# Patient Record
Sex: MALE | Race: WHITE | NOT HISPANIC OR LATINO | Employment: FULL TIME | URBAN - METROPOLITAN AREA
[De-identification: names, ages, dates, MRNs, and addresses within clinical notes are randomized per-mention and may not be internally consistent; named-entity substitution may affect disease eponyms.]

---

## 2017-01-30 NOTE — PROGRESS NOTES
SUBJECTIVE:                                                    Baldemar Johnson is a 27 year old male who presents to clinic today for the following health issues:      HPI     Would like to get chant ix.     Medication Followup of Adderall     Taking Medication as prescribed: yes    Side Effects:  None    Medication Helping Symptoms:  NO-I feel like I'm growing a tolerance to it        Problem list and histories reviewed & adjusted, as indicated.  Additional history: as documented    Medication Followup of Minocycline     Taking Medication as prescribed: yes    Side Effects:  None    Medication Helping Symptoms:  NO- Dr said it gets worse before it gets better?Been taking it for a little over a month.        Patient Active Problem List   Diagnosis     Moderate episode of recurrent major depressive disorder (H)     Attention deficit hyperactivity disorder (ADHD), combined type     Tobacco use disorder     Acne vulgaris     History reviewed. No pertinent past surgical history.    Social History   Substance Use Topics     Smoking status: Current Every Day Smoker -- 0.50 packs/day     Start date: 07/12/2006     Smokeless tobacco: Not on file     Alcohol Use: Yes      Comment: occ     Family History   Problem Relation Age of Onset     MENTAL ILLNESS Father          Current Outpatient Prescriptions   Medication Sig Dispense Refill     [START ON 4/3/2017] amphetamine-dextroamphetamine (ADDERALL XR) 10 MG per 24 hr capsule Take 1 capsule (10 mg) by mouth daily 30 capsule 0     [START ON 4/3/2017] amphetamine-dextroamphetamine (ADDERALL) 10 MG per tablet Take one pill in the afternoon - no later than 3:00PM as needed 30 tablet 0     minocycline (MINOCIN/DYNACIN) 100 MG capsule 100 mg  0     TYLENOL 325 MG PO TABS 2 TABLETS EVERY 4 HOURS AS NEEDED for pain/fever       IBUPROFEN 200 MG PO CAPS 1-3 CAPS EVERY 6 HOURS AS NEEDED for pain/fever       No Known Allergies  BP Readings from Last 3 Encounters:   02/03/17 110/40  "  11/04/16 114/70   09/08/16 106/60    Wt Readings from Last 3 Encounters:   02/03/17 200 lb (90.719 kg)   11/04/16 195 lb (88.451 kg)   09/08/16 177 lb (80.287 kg)                  Labs reviewed in EPIC  Problem list, Medication list, Allergies, and Medical/Social/Surgical histories reviewed in [facility] and updated as appropriate.    ROS:  Constitutional, HEENT, cardiovascular, pulmonary, gi and gu systems are negative, except as otherwise noted.    OBJECTIVE:                                                    /40 mmHg  Pulse 80  Temp(Src) 98.1  F (36.7  C) (Temporal)  Resp 16  Ht 6' 2.61\" (1.895 m)  Wt 200 lb (90.719 kg)  BMI 25.26 kg/m2  Body mass index is 25.26 kg/(m^2).  Physical Exam   Constitutional: He appears well-developed and well-nourished.   HENT:   Head: Normocephalic and atraumatic.   Eyes: EOM are normal.   Neck: Normal range of motion.   Cardiovascular: Normal rate and regular rhythm.    Pulmonary/Chest: Effort normal and breath sounds normal.   Psychiatric: He has a normal mood and affect.         Diagnostic Test Results:  none      ASSESSMENT/PLAN:                                                      Problem List Items Addressed This Visit     Attention deficit hyperactivity disorder (ADHD), combined type     Well controlled on current dose of adderall  Continue dose  Refills for 3 months given   Recheck in 3 months         Relevant Medications    amphetamine-dextroamphetamine (ADDERALL XR) 10 MG per 24 hr capsule (Start on 4/3/2017)    amphetamine-dextroamphetamine (ADDERALL) 10 MG per tablet (Start on 4/3/2017)    Acne vulgaris     milldly imporved  Continue the higher dose of minocycline as ordered by dermatology         Relevant Medications    minocycline (MINOCIN/DYNACIN) 100 MG capsule      Other Visit Diagnoses     Inattention         Relevant Medications     amphetamine-dextroamphetamine (ADDERALL XR) 10 MG per 24 hr capsule (Start on 4/3/2017)     amphetamine-dextroamphetamine " (ADDERALL) 10 MG per tablet (Start on 4/3/2017)            Tobacco Cessation:   reports that he has been smoking.  He started smoking about 10 years ago. He does not have any smokeless tobacco history on file.  Tobacco Cessation Action Plan: pt in precontemplative phase. Currently going through stressors.. Will set up a quit date and will return to clinic when he is completely norah Levine MD  [facility]

## 2017-02-03 ENCOUNTER — OFFICE VISIT (OUTPATIENT)
Dept: FAMILY MEDICINE | Facility: OTHER | Age: 27
End: 2017-02-03
Payer: COMMERCIAL

## 2017-02-03 VITALS
HEART RATE: 80 BPM | HEIGHT: 75 IN | DIASTOLIC BLOOD PRESSURE: 40 MMHG | WEIGHT: 200 LBS | TEMPERATURE: 98.1 F | BODY MASS INDEX: 24.87 KG/M2 | RESPIRATION RATE: 16 BRPM | SYSTOLIC BLOOD PRESSURE: 110 MMHG

## 2017-02-03 DIAGNOSIS — F90.2 ATTENTION DEFICIT HYPERACTIVITY DISORDER (ADHD), COMBINED TYPE: ICD-10-CM

## 2017-02-03 DIAGNOSIS — R41.840 INATTENTION: ICD-10-CM

## 2017-02-03 DIAGNOSIS — L70.0 ACNE VULGARIS: ICD-10-CM

## 2017-02-03 PROCEDURE — 99213 OFFICE O/P EST LOW 20 MIN: CPT | Performed by: FAMILY MEDICINE

## 2017-02-03 RX ORDER — DEXTROAMPHETAMINE SACCHARATE, AMPHETAMINE ASPARTATE, DEXTROAMPHETAMINE SULFATE AND AMPHETAMINE SULFATE 2.5; 2.5; 2.5; 2.5 MG/1; MG/1; MG/1; MG/1
TABLET ORAL
Qty: 30 TABLET | Refills: 0 | Status: SHIPPED | OUTPATIENT
Start: 2017-02-03 | End: 2017-02-03

## 2017-02-03 RX ORDER — DEXTROAMPHETAMINE SACCHARATE, AMPHETAMINE ASPARTATE, DEXTROAMPHETAMINE SULFATE AND AMPHETAMINE SULFATE 2.5; 2.5; 2.5; 2.5 MG/1; MG/1; MG/1; MG/1
TABLET ORAL
Qty: 30 TABLET | Refills: 0 | Status: SHIPPED | OUTPATIENT
Start: 2017-03-03 | End: 2017-02-03

## 2017-02-03 RX ORDER — DEXTROAMPHETAMINE SACCHARATE, AMPHETAMINE ASPARTATE MONOHYDRATE, DEXTROAMPHETAMINE SULFATE AND AMPHETAMINE SULFATE 2.5; 2.5; 2.5; 2.5 MG/1; MG/1; MG/1; MG/1
10 CAPSULE, EXTENDED RELEASE ORAL DAILY
Qty: 30 CAPSULE | Refills: 0 | Status: SHIPPED | OUTPATIENT
Start: 2017-02-03 | End: 2017-02-03

## 2017-02-03 RX ORDER — DEXTROAMPHETAMINE SACCHARATE, AMPHETAMINE ASPARTATE MONOHYDRATE, DEXTROAMPHETAMINE SULFATE AND AMPHETAMINE SULFATE 2.5; 2.5; 2.5; 2.5 MG/1; MG/1; MG/1; MG/1
10 CAPSULE, EXTENDED RELEASE ORAL DAILY
Qty: 30 CAPSULE | Refills: 0 | Status: SHIPPED | OUTPATIENT
Start: 2017-03-03 | End: 2017-02-03

## 2017-02-03 RX ORDER — DEXTROAMPHETAMINE SACCHARATE, AMPHETAMINE ASPARTATE MONOHYDRATE, DEXTROAMPHETAMINE SULFATE AND AMPHETAMINE SULFATE 2.5; 2.5; 2.5; 2.5 MG/1; MG/1; MG/1; MG/1
10 CAPSULE, EXTENDED RELEASE ORAL DAILY
Qty: 30 CAPSULE | Refills: 0 | Status: SHIPPED | OUTPATIENT
Start: 2017-04-03 | End: 2017-06-13

## 2017-02-03 RX ORDER — DEXTROAMPHETAMINE SACCHARATE, AMPHETAMINE ASPARTATE, DEXTROAMPHETAMINE SULFATE AND AMPHETAMINE SULFATE 2.5; 2.5; 2.5; 2.5 MG/1; MG/1; MG/1; MG/1
TABLET ORAL
Qty: 30 TABLET | Refills: 0 | Status: SHIPPED | OUTPATIENT
Start: 2017-04-03 | End: 2017-06-13

## 2017-02-03 RX ORDER — MINOCYCLINE HYDROCHLORIDE 100 MG/1
100 CAPSULE ORAL
Refills: 0 | COMMUNITY
Start: 2016-12-20 | End: 2017-06-13

## 2017-02-03 ASSESSMENT — PAIN SCALES - GENERAL: PAINLEVEL: NO PAIN (0)

## 2017-02-03 NOTE — NURSING NOTE
"Chief Complaint   Patient presents with     Recheck Medication     Panel Management     tdap, hpv, flu        Initial /40 mmHg  Pulse 80  Temp(Src) 98.1  F (36.7  C) (Temporal)  Resp 16  Ht 6' 2.61\" (1.895 m)  Wt 200 lb (90.719 kg)  BMI 25.26 kg/m2 Estimated body mass index is 25.26 kg/(m^2) as calculated from the following:    Height as of this encounter: 6' 2.61\" (1.895 m).    Weight as of this encounter: 200 lb (90.719 kg).  BP completed using cuff size: regular  Janis Becerra CMA    "

## 2017-02-03 NOTE — MR AVS SNAPSHOT
"              After Visit Summary   2/3/2017    Baldemar Johnson    MRN: [id number]           Patient Information     Date Of Birth          1990        Visit Information        Provider Department      2/3/2017 1:20 PM Shagufta Levine MD [facility]        Today's Diagnoses     Need for HPV vaccine    -  1     Need for prophylactic vaccination and inoculation against influenza         Need for prophylactic vaccination with tetanus-diphtheria (TD)         Inattention            Follow-ups after your visit        Follow-up notes from your care team     Return in about 3 months (around 5/3/2017).      Who to contact     If you have questions or need follow up information about today's clinic visit or your schedule please contact [facility] directly at [phone].  Normal or non-critical lab and imaging results will be communicated to you by [employer], letter or phone within 4 business days after the clinic has received the results. If you do not hear from us within 7 days, please contact the clinic through [employer] or phone. If you have a critical or abnormal lab result, we will notify you by phone as soon as possible.  Submit refill requests through [employer] or call your pharmacy and they will forward the refill request to us. Please allow 3 business days for your refill to be completed.          Additional Information About Your Visit        [employer] Information     [employer] lets you send messages to your doctor, view your test results, renew your prescriptions, schedule appointments and more. To sign up, go to [email or web address] . Click on \"Log in\" on the left side of the screen, which will take you to the Welcome page. Then click on \"Sign up Now\" on the right side of the page.     You will be asked to enter the access code listed below, as well as some personal information. Please follow the directions to create your username and password.     Your access code is: " [email or web address]  Expires: 2017  1:47 PM     Your access code will  in 90 days. If you need help or a new code, please call your [facility] or [phone].        Care EveryWhere ID     This is your Care EveryWhere ID. This could be used by other organizations to access your [place] medical records  [id number]        Your Vitals Were     Pulse Temperature Respirations Height BMI (Body Mass Index)       80 98.1  F (36.7  C) (Temporal) 16 6' 2.61\" (1.895 m) 25.26 kg/m2        Blood Pressure from Last 3 Encounters:   17 110/40   16 114/70   16 106/60    Weight from Last 3 Encounters:   17 200 lb (90.719 kg)   16 195 lb (88.451 kg)   16 177 lb (80.287 kg)              Today, you had the following     No orders found for display         Today's Medication Changes          These changes are accurate as of: 2/3/17  1:47 PM.  If you have any questions, ask your nurse or doctor.               Start taking these medicines.        Dose/Directions    * amphetamine-dextroamphetamine 10 MG per 24 hr capsule   Commonly known as:  ADDERALL XR   Used for:  Inattention   Started by:  Shagufta Levine MD        Dose:  10 mg   Start taking on:  4/3/2017   Take 1 capsule (10 mg) by mouth daily   Quantity:  30 capsule   Refills:  0       * amphetamine-dextroamphetamine 10 MG per tablet   Commonly known as:  ADDERALL   Used for:  Inattention   Started by:  Shagufta Levine MD        Start taking on:  4/3/2017   Take one pill in the afternoon - no later than 3:00PM as needed   Quantity:  30 tablet   Refills:  0       * Notice:  This list has 2 medication(s) that are the same as other medications prescribed for you. Read the directions carefully, and ask your doctor or other care provider to review them with you.         Where to get your medicines      Some of these will need a paper prescription and others can be bought over the counter.  Ask your nurse if you have questions.     Bring a " paper prescription for each of these medications    - amphetamine-dextroamphetamine 10 MG per 24 hr capsule  - amphetamine-dextroamphetamine 10 MG per tablet             Primary Care Provider    None Specified       No primary provider on file.        Thank you!     Thank you for choosing [facility]  for your care. Our goal is always to provide you with excellent care. Hearing back from our patients is one way we can continue to improve our services. Please take a few minutes to complete the written survey that you may receive in the mail after your visit with us. Thank you!             Your Updated Medication List - Protect others around you: Learn how to safely use, store and throw away your medicines at www.disposemymeds.org.          This list is accurate as of: 2/3/17  1:47 PM.  Always use your most recent med list.                   Brand Name Dispense Instructions for use    * amphetamine-dextroamphetamine 10 MG per 24 hr capsule   Start taking on:  4/3/2017    ADDERALL XR    30 capsule    Take 1 capsule (10 mg) by mouth daily       * amphetamine-dextroamphetamine 10 MG per tablet   Start taking on:  4/3/2017    ADDERALL    30 tablet    Take one pill in the afternoon - no later than 3:00PM as needed       ibuprofen 200 MG capsule      1-3 CAPS EVERY 6 HOURS AS NEEDED for pain/fever       minocycline 100 MG capsule    MINOCIN/DYNACIN     100 mg       TYLENOL 325 MG tablet   Generic drug:  acetaminophen      2 TABLETS EVERY 4 HOURS AS NEEDED for pain/fever       * Notice:  This list has 2 medication(s) that are the same as other medications prescribed for you. Read the directions carefully, and ask your doctor or other care provider to review them with you.

## 2017-02-05 NOTE — ASSESSMENT & PLAN NOTE
Well controlled on current dose of adderall  Continue dose  Refills for 3 months given   Recheck in 3 months

## 2017-06-13 ENCOUNTER — OFFICE VISIT (OUTPATIENT)
Dept: FAMILY MEDICINE | Facility: CLINIC | Age: 27
End: 2017-06-13
Payer: COMMERCIAL

## 2017-06-13 VITALS
SYSTOLIC BLOOD PRESSURE: 112 MMHG | HEART RATE: 80 BPM | DIASTOLIC BLOOD PRESSURE: 66 MMHG | BODY MASS INDEX: 23.87 KG/M2 | TEMPERATURE: 98 F | WEIGHT: 189 LBS

## 2017-06-13 DIAGNOSIS — M25.511 ACUTE PAIN OF RIGHT SHOULDER: ICD-10-CM

## 2017-06-13 DIAGNOSIS — L70.0 ACNE VULGARIS: ICD-10-CM

## 2017-06-13 DIAGNOSIS — F90.2 ATTENTION DEFICIT HYPERACTIVITY DISORDER (ADHD), COMBINED TYPE: Primary | ICD-10-CM

## 2017-06-13 DIAGNOSIS — F33.1 MODERATE EPISODE OF RECURRENT MAJOR DEPRESSIVE DISORDER (H): ICD-10-CM

## 2017-06-13 DIAGNOSIS — Z23 NEED FOR TDAP VACCINATION: ICD-10-CM

## 2017-06-13 PROCEDURE — 90715 TDAP VACCINE 7 YRS/> IM: CPT | Performed by: NURSE PRACTITIONER

## 2017-06-13 PROCEDURE — 90471 IMMUNIZATION ADMIN: CPT | Performed by: NURSE PRACTITIONER

## 2017-06-13 PROCEDURE — 99214 OFFICE O/P EST MOD 30 MIN: CPT | Mod: 25 | Performed by: NURSE PRACTITIONER

## 2017-06-13 RX ORDER — NAPROXEN 500 MG/1
500 TABLET ORAL 2 TIMES DAILY PRN
Qty: 60 TABLET | Refills: 1 | Status: SHIPPED | OUTPATIENT
Start: 2017-06-13 | End: 2017-07-28

## 2017-06-13 RX ORDER — DEXTROAMPHETAMINE SACCHARATE, AMPHETAMINE ASPARTATE MONOHYDRATE, DEXTROAMPHETAMINE SULFATE AND AMPHETAMINE SULFATE 2.5; 2.5; 2.5; 2.5 MG/1; MG/1; MG/1; MG/1
10 CAPSULE, EXTENDED RELEASE ORAL DAILY
Qty: 30 CAPSULE | Refills: 0 | Status: SHIPPED | OUTPATIENT
Start: 2017-06-13 | End: 2017-07-28 | Stop reason: DRUGHIGH

## 2017-06-13 RX ORDER — DEXTROAMPHETAMINE SACCHARATE, AMPHETAMINE ASPARTATE, DEXTROAMPHETAMINE SULFATE AND AMPHETAMINE SULFATE 2.5; 2.5; 2.5; 2.5 MG/1; MG/1; MG/1; MG/1
TABLET ORAL
Qty: 30 TABLET | Refills: 0 | Status: SHIPPED | OUTPATIENT
Start: 2017-06-13 | End: 2017-07-28 | Stop reason: DRUGHIGH

## 2017-06-13 NOTE — MR AVS SNAPSHOT
After Visit Summary   6/13/2017    Baldemar Johnson    MRN: [id number]           Patient Information     Date Of Birth          1990        Visit Information        Provider Department      6/13/2017 10:30 AM Liza Bhatti APRN CNP [facility]        Today's Diagnoses     Attention deficit hyperactivity disorder (ADHD), combined type    -  1    Acne vulgaris        Acute pain of right shoulder        Moderate episode of recurrent major depressive disorder (H)           Follow-ups after your visit        Additional Services     DERMATOLOGY REFERRAL       Your provider has referred you to: [facility]: [facility] [phone]   [email or web address]    Please be aware that coverage of these services is subject to the terms and limitations of your health insurance plan.  Call member services at your health plan with any benefit or coverage questions.      Please bring the following with you to your appointment:    (1) Any X-Rays, CTs or MRIs which have been performed.  Contact the facility where they were done to arrange for  prior to your scheduled appointment.    (2) List of current medications  (3) This referral request   (4) Any documents/labs given to you for this referral                  Who to contact     If you have questions or need follow up information about today's clinic visit or your schedule please contact [facility] directly at [phone].  Normal or non-critical lab and imaging results will be communicated to you by MyChart, letter or phone within 4 business days after the clinic has received the results. If you do not hear from us within 7 days, please contact the clinic through MyChart or phone. If you have a critical or abnormal lab result, we will notify you by phone as soon as possible.  Submit refill requests through [employer] or call your pharmacy and they will  "forward the refill request to us. Please allow 3 business days for your refill to be completed.          Additional Information About Your Visit        [employer] Information     [employer] lets you send messages to your doctor, view your test results, renew your prescriptions, schedule appointments and more. To sign up, go to [email or web address] . Click on \"Log in\" on the left side of the screen, which will take you to the Welcome page. Then click on \"Sign up Now\" on the right side of the page.     You will be asked to enter the access code listed below, as well as some personal information. Please follow the directions to create your username and password.     Your access code is: [email or web address]  Expires: 2017 11:05 AM     Your access code will  in 90 days. If you need help or a new code, please call your [place] clinic or [phone].        Care EveryWhere ID     This is your Care EveryWhere ID. This could be used by other organizations to access your [place] medical records  [id number]        Your Vitals Were     Pulse Temperature BMI (Body Mass Index)             80 98  F (36.7  C) (Tympanic) 23.87 kg/m2          Blood Pressure from Last 3 Encounters:   17 112/66   17 110/40   16 114/70    Weight from Last 3 Encounters:   17 189 lb (85.7 kg)   17 200 lb (90.7 kg)   16 195 lb (88.5 kg)              We Performed the Following     DERMATOLOGY REFERRAL          Today's Medication Changes          These changes are accurate as of: 17 11:05 AM.  If you have any questions, ask your nurse or doctor.               Start taking these medicines.        Dose/Directions    naproxen 500 MG tablet   Commonly known as:  NAPROSYN   Used for:  Acute pain of right shoulder   Started by:  Liza Bhatti APRN CNP        Dose:  500 mg   Take 1 tablet (500 mg) by mouth 2 times daily as needed for moderate pain   Quantity:  60 tablet   Refills:  1            Where to get your " medicines      These medications were sent to [facility] [place], [place] - [address]  [address] , [facility] [address]     Phone:  [phone]     naproxen 500 MG tablet         Some of these will need a paper prescription and others can be bought over the counter.  Ask your nurse if you have questions.     Bring a paper prescription for each of these medications     amphetamine-dextroamphetamine 10 MG per 24 hr capsule    amphetamine-dextroamphetamine 10 MG per tablet                Primary Care Provider Office Phone # Fax #    Shagufta Levine [phone]       [facility] [address]    [facility] [address]        Thank you!     Thank you for choosing [facility]  for your care. Our goal is always to provide you with excellent care. Hearing back from our patients is one way we can continue to improve our services. Please take a few minutes to complete the written survey that you may receive in the mail after your visit with us. Thank you!             Your Updated Medication List - Protect others around you: Learn how to safely use, store and throw away your medicines at www.disposemymeds.org.          This list is accurate as of: 6/13/17 11:05 AM.  Always use your most recent med list.                   Brand Name Dispense Instructions for use    * amphetamine-dextroamphetamine 10 MG per 24 hr capsule    ADDERALL XR    30 capsule    Take 1 capsule (10 mg) by mouth daily       * amphetamine-dextroamphetamine 10 MG per tablet    ADDERALL    30 tablet    Take one pill in the afternoon - no later than 3:00PM as needed       ibuprofen 200 MG capsule      1-3 CAPS EVERY 6 HOURS AS NEEDED for pain/fever       naproxen 500 MG tablet    NAPROSYN    60 tablet    Take 1 tablet (500 mg) by mouth 2 times daily as needed for moderate pain       TYLENOL 325 MG tablet   Generic drug:  acetaminophen      2 TABLETS EVERY 4 HOURS AS NEEDED for  pain/fever       * Notice:  This list has 2 medication(s) that are the same as other medications prescribed for you. Read the directions carefully, and ask your doctor or other care provider to review them with you.

## 2017-06-13 NOTE — PROGRESS NOTES
SUBJECTIVE:                                                    Baldemar Johnson is a 27 year old male who presents to clinic today for the following health issues:      Joint Pain     Onset: couple weeks    Description:   Location: right shoulder  Character: Sharp    Intensity: mild, moderate    Progression of Symptoms: same    Accompanying Signs & Symptoms:  Other symptoms: decreased ROM   History:   Previous similar pain: no       Precipitating factors:   Trauma or overuse: no     Alleviating factors:  Improved by: NSAID - helped for short time       Therapies Tried and outcome: ibuprofen      The patient is here for 3 separate concerns:  1. Right shoulder pain. He reports having pain in the right shoulder for the past 2 weeks. Pain is associated primarily with internal rotation against resistance, such as when he reaches behind himself to grab a pillow in his bed, or other such activities. He otherwise notes no tenderness to palpation, and has a range of motion. He does not recall any one incident of trauma. However, he does a lot of lifting at work repetitively. He has no weakness, numbness, or tingling into the forearm or fingers. He denies neck pain.    2. He has a history of ADHD, did have a consultation with Greta. He was taking Adderall, was unable to make it to his follow-up appointments, and so has been out of medication. He has difficulty making it to the [facility], so is planning to get care at the clinic in [place] since it is closer to his home. He was taking Adderall XR 10 mg in the morning, Adderall immediate release in the afternoon. He denies associated side effects, states he felt much better while taking it. He denies use of drugs, previously had smoked marijuana but has quit that. He has been drinking alcohol socially, but states he does not drink it when he was taking the Adderall. He did have a drug screen last September before starting medication, and it showed no drugs    3. He has  severe acne. Was seeing a dermatologist in [place]. He states the only recommendation was to increase his minocycline, which didn't seem to be helping at all anyway. He would like to see another dermatologist, would like to consider Accutane        Problem list and histories reviewed & adjusted, as indicated.  Additional history: as documented    BP Readings from Last 3 Encounters:   06/13/17 112/66   02/03/17 110/40   11/04/16 114/70    Wt Readings from Last 3 Encounters:   06/13/17 189 lb (85.7 kg)   02/03/17 200 lb (90.7 kg)   11/04/16 195 lb (88.5 kg)                    Reviewed and updated as needed this visit by clinical staff  Tobacco  Allergies  Meds  Med Hx  Surg Hx  Fam Hx  Soc Hx      Reviewed and updated as needed this visit by Provider         ROS:  Constitutional, HEENT, cardiovascular, pulmonary, gi and gu systems are negative, except as otherwise noted.    OBJECTIVE:                                                    /66  Pulse 80  Temp 98  F (36.7  C) (Tympanic)  Wt 189 lb (85.7 kg)  BMI 23.87 kg/m2  Body mass index is 23.87 kg/(m^2).  GENERAL: healthy, alert and no distress  RESP: lungs clear to auscultation - no rales, rhonchi or wheezes  CV: regular rate and rhythm, normal S1 S2, no S3 or S4, no murmur, click or rub, no peripheral edema and peripheral pulses strong  MS: Normal muscle bulk and tone. No abnormality of the right shoulder noted on visual inspection. No edema or deformity, no discoloration. No pain to palpation of the a.c. joint. Full range of motion. He is able to abduct the arm in a full 180  arc. Forms external and internal rotational movements without pain. However, when internally rotating the shoulder and reaching behind him self as to pull the pillow, he reports pain along the anterior aspect of the glenohumeral joint.  SKIN: Multiple closed comedones, pustules, and cysts over the face and neck. Several acne scars noted, diffuse patchy erythema  PSYCH:  mentation appears normal, affect normal/bright         ASSESSMENT/PLAN:                                                      Problem List Items Addressed This Visit        Medium    Moderate episode of recurrent major depressive disorder (H)    Attention deficit hyperactivity disorder (ADHD), combined type - Primary    Relevant Medications    amphetamine-dextroamphetamine (ADDERALL XR) 10 MG per 24 hr capsule    amphetamine-dextroamphetamine (ADDERALL) 10 MG per tablet    Acne vulgaris    Relevant Orders    DERMATOLOGY REFERRAL      Other Visit Diagnoses     Acute pain of right shoulder        Relevant Medications    naproxen (NAPROSYN) 500 MG tablet    Need for Tdap vaccination        Relevant Orders    TDAP VACCINE (ADACEL) (Completed)    VACCINE ADMINISTRATION, INITIAL (Completed)         Naproxen 500 mg b.i.d., cautioned to take this with food  Ice to the shoulder 15 minutes at least twice daily  Avoid repetitive movements as much as possible  If not improving in 3-4 weeks will refer to physical therapy    Referral made to dermatology for further evaluation and management of acne    Resume Adderall XR 10 mg in the morning, Adderall immediate release 10 mg in the early afternoon. Follow up in clinic in one month for recheck and refills    SOLITARIO Hogan [facility]    Screening Questionnaire for Adult Immunization    Are you sick today?   No   Do you have allergies to medications, food, a vaccine component or latex?   No   Have you ever had a serious reaction after receiving a vaccination?   No   Do you have a long-term health problem with heart disease, lung disease, asthma, kidney disease, metabolic disease (e.g. diabetes), anemia, or other blood disorder?   No   Do you have cancer, leukemia, HIV/AIDS, or any other immune system problem?   No   In the past 3 months, have you taken medications that affect  your immune system, such as prednisone, other steroids, or anticancer drugs;  drugs for the treatment of rheumatoid arthritis, Crohn s disease, or psoriasis; or have you had radiation treatments?   No   Have you had a seizure, or a brain or other nervous system problem?   No   During the past year, have you received a transfusion of blood or blood     products, or been given immune (gamma) globulin or antiviral drug?   No   For women: Are you pregnant or is there a chance you could become        pregnant during the next month?   No   Have you received any vaccinations in the past 4 weeks?   No     Immunization questionnaire answers were all negative.      [facility] does apply for the following reason:  [place] Health [place] Program (MHCP[place] enrollee: [place] Medical Assistance ([place]), Senior Moments, or a Prepaid Medical Assistance Program (PMAP) (ages covered = 0-18).    Per orders of Liza Teixeira, injection of tdap given by Avril Esposito. Patient instructed to remain in clinic for 20 minutes afterwards, and to report any adverse reaction to me immediately.       Screening performed by Avril Esposito on 6/13/2017 at 11:13 AM.  Verified patient's name and date of birth to confirm prior to injection. Instructed patient to remain in clinic 20 minutes following injection, in case allergic reaction occurs seek medical staff. A Case MA

## 2017-06-14 ASSESSMENT — PATIENT HEALTH QUESTIONNAIRE - PHQ9: SUM OF ALL RESPONSES TO PHQ QUESTIONS 1-9: 7

## 2017-07-28 ENCOUNTER — OFFICE VISIT (OUTPATIENT)
Dept: FAMILY MEDICINE | Facility: CLINIC | Age: 27
End: 2017-07-28
Payer: COMMERCIAL

## 2017-07-28 VITALS
WEIGHT: 185 LBS | DIASTOLIC BLOOD PRESSURE: 68 MMHG | HEART RATE: 70 BPM | TEMPERATURE: 98.6 F | SYSTOLIC BLOOD PRESSURE: 120 MMHG | BODY MASS INDEX: 23.37 KG/M2

## 2017-07-28 DIAGNOSIS — F90.2 ATTENTION DEFICIT HYPERACTIVITY DISORDER (ADHD), COMBINED TYPE: ICD-10-CM

## 2017-07-28 PROCEDURE — 99213 OFFICE O/P EST LOW 20 MIN: CPT | Performed by: NURSE PRACTITIONER

## 2017-07-28 RX ORDER — DEXTROAMPHETAMINE SACCHARATE, AMPHETAMINE ASPARTATE, DEXTROAMPHETAMINE SULFATE AND AMPHETAMINE SULFATE 3.75; 3.75; 3.75; 3.75 MG/1; MG/1; MG/1; MG/1
15 TABLET ORAL 2 TIMES DAILY
Qty: 60 TABLET | Refills: 0 | Status: SHIPPED | OUTPATIENT
Start: 2017-07-28 | End: 2017-08-30

## 2017-07-28 RX ORDER — DEXTROAMPHETAMINE SACCHARATE, AMPHETAMINE ASPARTATE MONOHYDRATE, DEXTROAMPHETAMINE SULFATE AND AMPHETAMINE SULFATE 2.5; 2.5; 2.5; 2.5 MG/1; MG/1; MG/1; MG/1
10 CAPSULE, EXTENDED RELEASE ORAL DAILY
Qty: 30 CAPSULE | Refills: 0 | Status: CANCELLED | OUTPATIENT
Start: 2017-07-28

## 2017-07-28 RX ORDER — DEXTROAMPHETAMINE SACCHARATE, AMPHETAMINE ASPARTATE MONOHYDRATE, DEXTROAMPHETAMINE SULFATE AND AMPHETAMINE SULFATE 5; 5; 5; 5 MG/1; MG/1; MG/1; MG/1
20 CAPSULE, EXTENDED RELEASE ORAL DAILY
Qty: 30 CAPSULE | Refills: 0 | Status: SHIPPED | OUTPATIENT
Start: 2017-07-28 | End: 2017-08-30

## 2017-07-28 RX ORDER — DEXTROAMPHETAMINE SACCHARATE, AMPHETAMINE ASPARTATE, DEXTROAMPHETAMINE SULFATE AND AMPHETAMINE SULFATE 2.5; 2.5; 2.5; 2.5 MG/1; MG/1; MG/1; MG/1
TABLET ORAL
Qty: 30 TABLET | Refills: 0 | Status: CANCELLED | OUTPATIENT
Start: 2017-07-28

## 2017-07-28 NOTE — NURSING NOTE
"Chief Complaint   Patient presents with     RECHECK       Initial There were no vitals taken for this visit. Estimated body mass index is 23.87 kg/(m^2) as calculated from the following:    Height as of 2/3/17: 6' 2.61\" (1.895 m).    Weight as of 6/13/17: 189 lb (85.7 kg).  Medication Reconciliation: complete  "

## 2017-07-28 NOTE — MR AVS SNAPSHOT
"              After Visit Summary   2017    Baldemar Johnson    MRN: [id number]           Patient Information     Date Of Birth          1990        Visit Information        Provider Department      2017 8:30 AM Liza Bhatti APRN CNP [facility]        Today's Diagnoses     Attention deficit hyperactivity disorder (ADHD), combined type           Follow-ups after your visit        Who to contact     If you have questions or need follow up information about today's clinic visit or your schedule please contact [facility] directly at [phone].  Normal or non-critical lab and imaging results will be communicated to you by Visantehart, letter or phone within 4 business days after the clinic has received the results. If you do not hear from us within 7 days, please contact the clinic through Augmentt or phone. If you have a critical or abnormal lab result, we will notify you by phone as soon as possible.  Submit refill requests through [employer] or call your pharmacy and they will forward the refill request to us. Please allow 3 business days for your refill to be completed.          Additional Information About Your Visit        MyChart Information     [employer] lets you send messages to your doctor, view your test results, renew your prescriptions, schedule appointments and more. To sign up, go to [email or web address] . Click on \"Log in\" on the left side of the screen, which will take you to the Welcome page. Then click on \"Sign up Now\" on the right side of the page.     You will be asked to enter the access code listed below, as well as some personal information. Please follow the directions to create your username and password.     Your access code is: [email or web address]  Expires: 2017 11:05 AM     Your access code will  in 90 days. If you need help or a new code, please call your [facility] or [phone].        Care EveryWhere ID     This is your Care " EveryWhere ID. This could be used by other organizations to access your [facility] medical records  [id number]        Your Vitals Were     Pulse Temperature BMI (Body Mass Index)             70 98.6  F (37  C) (Tympanic) 23.37 kg/m2          Blood Pressure from Last 3 Encounters:   07/28/17 120/68   06/13/17 112/66   02/03/17 110/40    Weight from Last 3 Encounters:   07/28/17 185 lb (83.9 kg)   06/13/17 189 lb (85.7 kg)   02/03/17 200 lb (90.7 kg)              Today, you had the following     No orders found for display         Today's Medication Changes          These changes are accurate as of: 7/28/17  9:07 AM.  If you have any questions, ask your nurse or doctor.               Start taking these medicines.        Dose/Directions    * amphetamine-dextroamphetamine 20 MG per 24 hr capsule   Commonly known as:  ADDERALL XR   Used for:  Attention deficit hyperactivity disorder (ADHD), combined type   Replaces:  amphetamine-dextroamphetamine 10 MG per 24 hr capsule   Started by:  Liza Bhatti APRN CNP        Dose:  20 mg   Take 1 capsule (20 mg) by mouth daily   Quantity:  30 capsule   Refills:  0       * amphetamine-dextroamphetamine 15 MG per tablet   Commonly known as:  ADDERALL   Used for:  Attention deficit hyperactivity disorder (ADHD), combined type   Replaces:  amphetamine-dextroamphetamine 10 MG per tablet   Started by:  Liza Bhatti APRN CNP        Dose:  15 mg   Take 1 tablet (15 mg) by mouth 2 times daily   Quantity:  60 tablet   Refills:  0       * Notice:  This list has 2 medication(s) that are the same as other medications prescribed for you. Read the directions carefully, and ask your doctor or other care provider to review them with you.      Stop taking these medicines if you haven't already. Please contact your care team if you have questions.     amphetamine-dextroamphetamine 10 MG per 24 hr capsule   Commonly known as:  ADDERALL XR   Replaced by:  amphetamine-dextroamphetamine 20  MG per 24 hr capsule   Stopped by:  Liza Bhatti APRN CNP           amphetamine-dextroamphetamine 10 MG per tablet   Commonly known as:  ADDERALL   Replaced by:  amphetamine-dextroamphetamine 15 MG per tablet   Stopped by:  Liza Bhatti APRN CNP                Where to get your medicines      Some of these will need a paper prescription and others can be bought over the counter.  Ask your nurse if you have questions.     Bring a paper prescription for each of these medications     amphetamine-dextroamphetamine 15 MG per tablet    amphetamine-dextroamphetamine 20 MG per 24 hr capsule                Primary Care Provider Office Phone # Fax #    SOLITARIO Hogan [phone]       [facility]  [facility]   [place] [address]        Equal Access to Services     MARYLU WAGNER : Hadii verito joneso Somaneali, waaxda luqadaha, qaybta kaalmada adeegyada, waxay sarahin catherine torres. So [facility] [phone].    ATENCIÓN: Si habla español, tiene a jamil disposición servicios gratuitos de asistencia lingüística. [facility] [phone].    We comply with applicable federal civil rights laws and [place] laws. We do not discriminate on the basis of race, color, national origin, age, disability sex, sexual orientation or gender identity.            Thank you!     Thank you for choosing [facility]  for your care. Our goal is always to provide you with excellent care. Hearing back from our patients is one way we can continue to improve our services. Please take a few minutes to complete the written survey that you may receive in the mail after your visit with us. Thank you!             Your Updated Medication List - Protect others around you: Learn how to safely use, store and throw away your medicines at www.disposemymeds.org.          This list is accurate as of: 7/28/17  9:07 AM.  Always use your most recent med list.                   Brand Name  Dispense Instructions for use Diagnosis    * amphetamine-dextroamphetamine 20 MG per 24 hr capsule    ADDERALL XR    30 capsule    Take 1 capsule (20 mg) by mouth daily    Attention deficit hyperactivity disorder (ADHD), combined type       * amphetamine-dextroamphetamine 15 MG per tablet    ADDERALL    60 tablet    Take 1 tablet (15 mg) by mouth 2 times daily    Attention deficit hyperactivity disorder (ADHD), combined type       ibuprofen 200 MG capsule      1-3 CAPS EVERY 6 HOURS AS NEEDED for pain/fever        TYLENOL 325 MG tablet   Generic drug:  acetaminophen      2 TABLETS EVERY 4 HOURS AS NEEDED for pain/fever        * Notice:  This list has 2 medication(s) that are the same as other medications prescribed for you. Read the directions carefully, and ask your doctor or other care provider to review them with you.

## 2017-08-30 DIAGNOSIS — F90.2 ATTENTION DEFICIT HYPERACTIVITY DISORDER (ADHD), COMBINED TYPE: ICD-10-CM

## 2017-08-30 NOTE — TELEPHONE ENCOUNTER
adderall xr 20      Last Written Prescription Date: 07/28/2017  Last Fill Quantity: 30,  # refills: 0   Last Office Visit with [facility], [facility] or [facility] prescribing provider: 07/28/2017    adderall 15      Last Written Prescription Date: 07/28/2017  Last Fill Quantity: 60,  # refills: 0   Last Office Visit with [facility], [facility] or [facility] prescribing provider: 07/28/2017    Thank You,  Demian Lopez, Pharmacy [facility] [place]

## 2017-08-31 RX ORDER — DEXTROAMPHETAMINE SACCHARATE, AMPHETAMINE ASPARTATE, DEXTROAMPHETAMINE SULFATE AND AMPHETAMINE SULFATE 3.75; 3.75; 3.75; 3.75 MG/1; MG/1; MG/1; MG/1
15 TABLET ORAL 2 TIMES DAILY
Qty: 60 TABLET | Refills: 0 | Status: SHIPPED | OUTPATIENT
Start: 2017-08-31 | End: 2017-10-09

## 2017-08-31 RX ORDER — DEXTROAMPHETAMINE SACCHARATE, AMPHETAMINE ASPARTATE MONOHYDRATE, DEXTROAMPHETAMINE SULFATE AND AMPHETAMINE SULFATE 5; 5; 5; 5 MG/1; MG/1; MG/1; MG/1
20 CAPSULE, EXTENDED RELEASE ORAL DAILY
Qty: 30 CAPSULE | Refills: 0 | Status: SHIPPED | OUTPATIENT
Start: 2017-08-31 | End: 2017-10-09

## 2017-10-09 DIAGNOSIS — F90.2 ATTENTION DEFICIT HYPERACTIVITY DISORDER (ADHD), COMBINED TYPE: ICD-10-CM

## 2017-10-09 NOTE — TELEPHONE ENCOUNTER
Adderall salt 15mg       Last Written Prescription Date: 08/31/2017  Last Fill Quantity: 60,  # refills: 0     Adderall XR 20MG      Last Written Prescription Date: 08/31/2017  Last Fill Quantity: 30,  # refills: 0   Last Office Visit with [facility], [facility] or [facility] prescribing provider: 07/28/2017                                         Next 5 appointments (look out 90 days)     Oct 18, 2017 10:00 AM CDT   Office Visit with SOLITARIO Hogan [facility]    [address] [place] [address] [id number] [phone]                  Thanks  Vanita Obrien [facility]   [phone]

## 2017-10-11 RX ORDER — DEXTROAMPHETAMINE SACCHARATE, AMPHETAMINE ASPARTATE MONOHYDRATE, DEXTROAMPHETAMINE SULFATE AND AMPHETAMINE SULFATE 5; 5; 5; 5 MG/1; MG/1; MG/1; MG/1
20 CAPSULE, EXTENDED RELEASE ORAL DAILY
Qty: 30 CAPSULE | Refills: 0 | Status: SHIPPED | OUTPATIENT
Start: 2017-10-11 | End: 2017-11-14

## 2017-10-11 RX ORDER — DEXTROAMPHETAMINE SACCHARATE, AMPHETAMINE ASPARTATE, DEXTROAMPHETAMINE SULFATE AND AMPHETAMINE SULFATE 3.75; 3.75; 3.75; 3.75 MG/1; MG/1; MG/1; MG/1
15 TABLET ORAL 2 TIMES DAILY
Qty: 60 TABLET | Refills: 0 | Status: SHIPPED | OUTPATIENT
Start: 2017-10-11 | End: 2017-11-14

## 2017-11-14 ENCOUNTER — TELEPHONE (OUTPATIENT)
Dept: FAMILY MEDICINE | Facility: CLINIC | Age: 27
End: 2017-11-14

## 2017-11-14 DIAGNOSIS — F90.2 ATTENTION DEFICIT HYPERACTIVITY DISORDER (ADHD), COMBINED TYPE: ICD-10-CM

## 2017-11-21 RX ORDER — DEXTROAMPHETAMINE SACCHARATE, AMPHETAMINE ASPARTATE, DEXTROAMPHETAMINE SULFATE AND AMPHETAMINE SULFATE 3.75; 3.75; 3.75; 3.75 MG/1; MG/1; MG/1; MG/1
15 TABLET ORAL 2 TIMES DAILY
Qty: 60 TABLET | Refills: 0 | Status: SHIPPED | OUTPATIENT
Start: 2017-11-21 | End: 2017-12-19

## 2017-11-21 RX ORDER — DEXTROAMPHETAMINE SACCHARATE, AMPHETAMINE ASPARTATE MONOHYDRATE, DEXTROAMPHETAMINE SULFATE AND AMPHETAMINE SULFATE 5; 5; 5; 5 MG/1; MG/1; MG/1; MG/1
20 CAPSULE, EXTENDED RELEASE ORAL DAILY
Qty: 30 CAPSULE | Refills: 0 | Status: SHIPPED | OUTPATIENT
Start: 2017-11-21 | End: 2017-12-19

## 2017-11-21 NOTE — TELEPHONE ENCOUNTER
Routing ADDERALL  refill request to provider for review/approval because:  Drug not on the [facility] refill protocol   PRICILLA Elizalde

## 2017-12-19 DIAGNOSIS — F90.2 ATTENTION DEFICIT HYPERACTIVITY DISORDER (ADHD), COMBINED TYPE: ICD-10-CM

## 2017-12-19 RX ORDER — DEXTROAMPHETAMINE SACCHARATE, AMPHETAMINE ASPARTATE, DEXTROAMPHETAMINE SULFATE AND AMPHETAMINE SULFATE 3.75; 3.75; 3.75; 3.75 MG/1; MG/1; MG/1; MG/1
TABLET ORAL
Qty: 30 TABLET | Refills: 0 | Status: SHIPPED | OUTPATIENT
Start: 2017-12-19 | End: 2018-01-04 | Stop reason: DRUGHIGH

## 2017-12-19 RX ORDER — DEXTROAMPHETAMINE SACCHARATE, AMPHETAMINE ASPARTATE MONOHYDRATE, DEXTROAMPHETAMINE SULFATE AND AMPHETAMINE SULFATE 5; 5; 5; 5 MG/1; MG/1; MG/1; MG/1
20 CAPSULE, EXTENDED RELEASE ORAL DAILY
Qty: 30 CAPSULE | Refills: 0 | Status: SHIPPED | OUTPATIENT
Start: 2017-12-19 | End: 2018-01-04

## 2017-12-20 NOTE — TELEPHONE ENCOUNTER
I do not know when this got changed to 60 tablets a month.  Looking back at my notes during his visits, he was getting Adderall XR 20 mg in the morning, and Adderall 15 mg immediate release in the early afternoon.  He should not be taking the immediate release twice daily along with the extended release.  I think he should make an appointment to come in and discuss this, he did not make it to his last  appointment

## 2017-12-20 NOTE — TELEPHONE ENCOUNTER
Patients wife, Jada picked up prescriptions today 12.20 and the Adderall 15mg rapid release was only 30 qty. Instead of the 60 qty that they usually get. Patients wife is concerned and states the quantity needs to be 60. Please advise.

## 2018-01-04 ENCOUNTER — OFFICE VISIT (OUTPATIENT)
Dept: FAMILY MEDICINE | Facility: CLINIC | Age: 28
End: 2018-01-04
Payer: COMMERCIAL

## 2018-01-04 VITALS
BODY MASS INDEX: 23.81 KG/M2 | HEART RATE: 60 BPM | TEMPERATURE: 95.9 F | WEIGHT: 175.8 LBS | DIASTOLIC BLOOD PRESSURE: 68 MMHG | HEIGHT: 72 IN | SYSTOLIC BLOOD PRESSURE: 120 MMHG

## 2018-01-04 DIAGNOSIS — F90.2 ATTENTION DEFICIT HYPERACTIVITY DISORDER (ADHD), COMBINED TYPE: ICD-10-CM

## 2018-01-04 PROCEDURE — 99213 OFFICE O/P EST LOW 20 MIN: CPT | Performed by: NURSE PRACTITIONER

## 2018-01-04 RX ORDER — DEXTROAMPHETAMINE SACCHARATE, AMPHETAMINE ASPARTATE MONOHYDRATE, DEXTROAMPHETAMINE SULFATE AND AMPHETAMINE SULFATE 5; 5; 5; 5 MG/1; MG/1; MG/1; MG/1
20 CAPSULE, EXTENDED RELEASE ORAL DAILY
Qty: 30 CAPSULE | Refills: 0 | Status: SHIPPED | OUTPATIENT
Start: 2018-03-20 | End: 2018-04-25 | Stop reason: DRUGHIGH

## 2018-01-04 RX ORDER — DEXTROAMPHETAMINE SACCHARATE, AMPHETAMINE ASPARTATE MONOHYDRATE, DEXTROAMPHETAMINE SULFATE AND AMPHETAMINE SULFATE 5; 5; 5; 5 MG/1; MG/1; MG/1; MG/1
20 CAPSULE, EXTENDED RELEASE ORAL DAILY
Qty: 30 CAPSULE | Refills: 0 | Status: SHIPPED | OUTPATIENT
Start: 2018-01-18 | End: 2018-01-04

## 2018-01-04 RX ORDER — DEXTROAMPHETAMINE SACCHARATE, AMPHETAMINE ASPARTATE, DEXTROAMPHETAMINE SULFATE AND AMPHETAMINE SULFATE 3.75; 3.75; 3.75; 3.75 MG/1; MG/1; MG/1; MG/1
15 TABLET ORAL 2 TIMES DAILY
Qty: 28 TABLET | Refills: 0 | Status: SHIPPED | OUTPATIENT
Start: 2018-01-04 | End: 2018-02-18

## 2018-01-04 RX ORDER — DEXTROAMPHETAMINE SACCHARATE, AMPHETAMINE ASPARTATE MONOHYDRATE, DEXTROAMPHETAMINE SULFATE AND AMPHETAMINE SULFATE 5; 5; 5; 5 MG/1; MG/1; MG/1; MG/1
20 CAPSULE, EXTENDED RELEASE ORAL DAILY
Qty: 30 CAPSULE | Refills: 0 | Status: SHIPPED | OUTPATIENT
Start: 2018-02-18 | End: 2018-01-04

## 2018-01-04 RX ORDER — DEXTROAMPHETAMINE SACCHARATE, AMPHETAMINE ASPARTATE, DEXTROAMPHETAMINE SULFATE AND AMPHETAMINE SULFATE 3.75; 3.75; 3.75; 3.75 MG/1; MG/1; MG/1; MG/1
TABLET ORAL
Qty: 60 TABLET | Refills: 0 | Status: SHIPPED | OUTPATIENT
Start: 2018-01-18 | End: 2018-04-25 | Stop reason: DRUGHIGH

## 2018-01-04 NOTE — MR AVS SNAPSHOT
"              After Visit Summary   2018    Baldemar Johnson    MRN: [id number]           Patient Information     Date Of Birth          1990        Visit Information        Provider Department      2018 8:30 AM Liza Bhatti APRN CNP [facility]        Today's Diagnoses     Attention deficit hyperactivity disorder (ADHD), combined type           Follow-ups after your visit        Who to contact     If you have questions or need follow up information about today's clinic visit or your schedule please contact [facility] directly at [phone].  Normal or non-critical lab and imaging results will be communicated to you by [employer]hart, letter or phone within 4 business days after the clinic has received the results. If you do not hear from us within 7 days, please contact the clinic through HealthyOutt or phone. If you have a critical or abnormal lab result, we will notify you by phone as soon as possible.  Submit refill requests through D[employer] or call your pharmacy and they will forward the refill request to us. Please allow 3 business days for your refill to be completed.          Additional Information About Your Visit        MyChart Information     [employer] lets you send messages to your doctor, view your test results, renew your prescriptions, schedule appointments and more. To sign up, go to [email or web address] . Click on \"Log in\" on the left side of the screen, which will take you to the Welcome page. Then click on \"Sign up Now\" on the right side of the page.     You will be asked to enter the access code listed below, as well as some personal information. Please follow the directions to create your username and password.     Your access code is: [email or web address]  Expires: 2018  3:47 PM     Your access code will  in 90 days. If you need help or a new code, please call your [facility] or [phone].        Care EveryWhere ID     This is your Care EveryWhere " ID. This could be used by other organizations to access your [facility] medical records  [id number]        Your Vitals Were     Pulse Temperature Height BMI (Body Mass Index)          60 95.9  F (35.5  C) (Tympanic) 6' (1.829 m) 23.84 kg/m2         Blood Pressure from Last 3 Encounters:   01/04/18 120/68   07/28/17 120/68   06/13/17 112/66    Weight from Last 3 Encounters:   01/04/18 175 lb 12.8 oz (79.7 kg)   07/28/17 185 lb (83.9 kg)   06/13/17 189 lb (85.7 kg)              Today, you had the following     No orders found for display         Today's Medication Changes          These changes are accurate as of: 1/4/18  9:14 AM.  If you have any questions, ask your nurse or doctor.               These medicines have changed or have updated prescriptions.        Dose/Directions    * amphetamine-dextroamphetamine 15 MG per tablet   Commonly known as:  ADDERALL   This may have changed:  You were already taking a medication with the same name, and this prescription was added. Make sure you understand how and when to take each.   Used for:  Attention deficit hyperactivity disorder (ADHD), combined type   Changed by:  Liza Bhatti APRN CNP        Dose:  15 mg   Take 1 tablet (15 mg) by mouth 2 times daily   Quantity:  28 tablet   Refills:  0       * amphetamine-dextroamphetamine 15 MG per tablet   Commonly known as:  ADDERALL   This may have changed:  additional instructions   Used for:  Attention deficit hyperactivity disorder (ADHD), combined type   Changed by:  Liza Bhatti APRN CNP        Start taking on:  1/18/2018   Take 1 capsule by mouth daily in the early afternoon; may take a second capsule in early evening when working late   Quantity:  60 tablet   Refills:  0       * amphetamine-dextroamphetamine 20 MG per 24 hr capsule   Commonly known as:  ADDERALL XR   This may have changed:  You were already taking a medication with the same name, and this prescription was added. Make sure you understand how  and when to take each.   Used for:  Attention deficit hyperactivity disorder (ADHD), combined type   Changed by:  Liza Bhatti APRN CNP        Dose:  20 mg   Start taking on:  3/20/2018   Take 1 capsule (20 mg) by mouth daily   Quantity:  30 capsule   Refills:  0       * Notice:  This list has 3 medication(s) that are the same as other medications prescribed for you. Read the directions carefully, and ask your doctor or other care provider to review them with you.         Where to get your medicines      Some of these will need a paper prescription and others can be bought over the counter.  Ask your nurse if you have questions.     Bring a paper prescription for each of these medications     amphetamine-dextroamphetamine 15 MG per tablet    amphetamine-dextroamphetamine 15 MG per tablet    amphetamine-dextroamphetamine 20 MG per 24 hr capsule                Primary Care Provider Office Phone # Fax #    SOLITARIO Hogan [phone] [address] [place] [address]        Equal Access to Services     MARYLU WAGNER AH: Hadii aad ku hadasho Soomaali, waaxda luqadaha, qaybta kaalmada adeegyada, waxay idiin hayirma gearaboris london . So [facility] [phone].    ATENCIÓN: Si habla español, tiene a jamil disposición servicios gratuitos de asistencia lingüística. Llame al [phone].    We comply with applicable federal civil rights laws and [place] laws. We do not discriminate on the basis of race, color, national origin, age, disability, sex, sexual orientation, or gender identity.            Thank you!     Thank you for choosing [facility]  for your care. Our goal is always to provide you with excellent care. Hearing back from our patients is one way we can continue to improve our services. Please take a few minutes to complete the written survey that you may receive in the mail after your visit with us. Thank you!             Your Updated Medication List - Protect  others around you: Learn how to safely use, store and throw away your medicines at www.disposemymeds.org.          This list is accurate as of: 1/4/18  9:14 AM.  Always use your most recent med list.                   Brand Name Dispense Instructions for use Diagnosis    * amphetamine-dextroamphetamine 15 MG per tablet    ADDERALL    28 tablet    Take 1 tablet (15 mg) by mouth 2 times daily    Attention deficit hyperactivity disorder (ADHD), combined type       * amphetamine-dextroamphetamine 15 MG per tablet   Start taking on:  1/18/2018    ADDERALL    60 tablet    Take 1 capsule by mouth daily in the early afternoon; may take a second capsule in early evening when working late    Attention deficit hyperactivity disorder (ADHD), combined type       * amphetamine-dextroamphetamine 20 MG per 24 hr capsule   Start taking on:  3/20/2018    ADDERALL XR    30 capsule    Take 1 capsule (20 mg) by mouth daily    Attention deficit hyperactivity disorder (ADHD), combined type       ibuprofen 200 MG capsule      1-3 CAPS EVERY 6 HOURS AS NEEDED for pain/fever        TYLENOL 325 MG tablet   Generic drug:  acetaminophen      2 TABLETS EVERY 4 HOURS AS NEEDED for pain/fever        * Notice:  This list has 3 medication(s) that are the same as other medications prescribed for you. Read the directions carefully, and ask your doctor or other care provider to review them with you.

## 2018-01-04 NOTE — PROGRESS NOTES
SUBJECTIVE:   Baldemar Johnson is a 27 year old male who presents to clinic today for the following health issues:      Medication Followup of Adderall    Taking Medication as prescribed: yes    Side Effects:  None    Medication Helping Symptoms:  yes         Baldemar has been taking Adderall XR 20 mg in the morning.  He then takes Adderall immediate release 15 mg around noon.  He has been working extended hours into the late evening, and had been taking Adderall immediate release 15 mg again around 5 PM.  His last refill was just for 30 tablets of the immediate release.  He is having difficulty getting through the full evening at work.  States he has difficulty concentrating, this leads to a lot of anxiety.  He would like to be able to continue to take 2 of the immediate release as long as he is working these extended hours.  He denies adverse side effects of medication.  He has had no weight loss, no increased anxiety, blood pressure remains normal.    Problem list and histories reviewed & adjusted, as indicated.  Additional history: as documented    BP Readings from Last 3 Encounters:   01/04/18 120/68   07/28/17 120/68   06/13/17 112/66    Wt Readings from Last 3 Encounters:   01/04/18 175 lb 12.8 oz (79.7 kg)   07/28/17 185 lb (83.9 kg)   06/13/17 189 lb (85.7 kg)                      Reviewed and updated as needed this visit by clinical staff     Reviewed and updated as needed this visit by Provider         ROS:  Constitutional, HEENT, cardiovascular, pulmonary, gi and gu systems are negative, except as otherwise noted.      OBJECTIVE:   /68  Pulse 60  Temp 95.9  F (35.5  C) (Tympanic)  Ht 6' (1.829 m)  Wt 175 lb 12.8 oz (79.7 kg)  BMI 23.84 kg/m2  Body mass index is 23.84 kg/(m^2).   GENERAL: healthy, alert and no distress  RESP: lungs clear to auscultation - no rales, rhonchi or wheezes  CV: regular rates and rhythm, normal S1 S2, no S3 or S4 and no murmur, click or rub  PSYCH: mentation appears normal,  affect normal/bright        ASSESSMENT/PLAN:     Problem List Items Addressed This Visit        Medium    Attention deficit hyperactivity disorder (ADHD), combined type    Relevant Medications    amphetamine-dextroamphetamine (ADDERALL) 15 MG per tablet (Start on 1/18/2018)    amphetamine-dextroamphetamine (ADDERALL XR) 20 MG per 24 hr capsule (Start on 3/20/2018)    amphetamine-dextroamphetamine (ADDERALL) 15 MG per tablet           Continue Adderall XR 20 mg daily in the morning  Adderall immediate release 15 mg around noon  Adderall immediate release 15 mg at 5 or 6 PM when working extended hours    With his next refill he will let me know if he is still needing to take the immediate release twice a day or just once daily.    SOLITARIO Hogan [facility]

## 2018-02-18 DIAGNOSIS — F90.2 ATTENTION DEFICIT HYPERACTIVITY DISORDER (ADHD), COMBINED TYPE: ICD-10-CM

## 2018-02-19 NOTE — TELEPHONE ENCOUNTER
Adderall 15 MG       Last Written Prescription Date:  1/18/18  Last Fill Quantity: 60,   # refills: 0  Last Office Visit: 1/4/18  Future Office visit:       Routing refill request to provider for review/approval because:  Drug not on the [facility], [facility] or [facility] refill protocol or controlled substance

## 2018-02-20 RX ORDER — DEXTROAMPHETAMINE SACCHARATE, AMPHETAMINE ASPARTATE, DEXTROAMPHETAMINE SULFATE AND AMPHETAMINE SULFATE 3.75; 3.75; 3.75; 3.75 MG/1; MG/1; MG/1; MG/1
15 TABLET ORAL 2 TIMES DAILY
Qty: 28 TABLET | Refills: 0 | Status: SHIPPED | OUTPATIENT
Start: 2018-02-20 | End: 2018-03-20

## 2018-03-20 DIAGNOSIS — F90.2 ATTENTION DEFICIT HYPERACTIVITY DISORDER (ADHD), COMBINED TYPE: ICD-10-CM

## 2018-03-21 RX ORDER — DEXTROAMPHETAMINE SACCHARATE, AMPHETAMINE ASPARTATE, DEXTROAMPHETAMINE SULFATE AND AMPHETAMINE SULFATE 3.75; 3.75; 3.75; 3.75 MG/1; MG/1; MG/1; MG/1
15 TABLET ORAL 2 TIMES DAILY
Qty: 28 TABLET | Refills: 0 | Status: SHIPPED | OUTPATIENT
Start: 2018-03-21 | End: 2018-04-25 | Stop reason: DRUGHIGH

## 2018-03-21 NOTE — TELEPHONE ENCOUNTER
adderall 15      Last Written Prescription Date:  2/20/18  Last Fill Quantity: 28,   # refills: 0  Last Office Visit: 1/4/18  Future Office visit:       Routing refill request to provider for review/approval because:  Drug not on the [facility], [facility] or [facility] refill protocol or controlled substance

## 2018-04-16 ENCOUNTER — TELEPHONE (OUTPATIENT)
Dept: FAMILY MEDICINE | Facility: CLINIC | Age: 28
End: 2018-04-16

## 2018-04-16 DIAGNOSIS — F90.2 ATTENTION DEFICIT HYPERACTIVITY DISORDER (ADHD), COMBINED TYPE: ICD-10-CM

## 2018-04-16 RX ORDER — DEXTROAMPHETAMINE SACCHARATE, AMPHETAMINE ASPARTATE, DEXTROAMPHETAMINE SULFATE AND AMPHETAMINE SULFATE 3.75; 3.75; 3.75; 3.75 MG/1; MG/1; MG/1; MG/1
15 TABLET ORAL 2 TIMES DAILY
Qty: 28 TABLET | Refills: 0 | Status: CANCELLED | OUTPATIENT
Start: 2018-04-16

## 2018-04-16 NOTE — TELEPHONE ENCOUNTER
Baldemar's wife is calling stating the last refill he got was 14 day supply, not for a 30 day supply. He is out of the [place] now and it's affecting him at work. Can you fill it for him? Please call and advise.

## 2018-04-16 NOTE — TELEPHONE ENCOUNTER
Adderall       Last Written Prescription Date:  1/18/18  Last Fill Quantity: 60,   # refills: 0  Last Office Visit: 1/18/18  Future Office visit:       Routing refill request to provider for review/approval because:  Drug not on the [facility], [facility] or [facility] refill protocol or controlled substance

## 2018-04-17 NOTE — TELEPHONE ENCOUNTER
Patients wife states that patient has been out of this medication for 14 days and they would like the extended release filled as soon as possible. Wife also states to let them know if Liza Bhatti would like to see patient.

## 2018-04-17 NOTE — TELEPHONE ENCOUNTER
LM on patient numerical id vm. Please ask if patient is currently taking Adderall as listed? Provider was under the impression that this was a short term increase in medication.

## 2018-04-17 NOTE — TELEPHONE ENCOUNTER
He has been on an excessive dose.  My understanding was that this would just be temporary.  If he continues to need the stimulant for later in the evening due to working extended hours, we could do the extended release twice daily, that may be more appropriate

## 2018-04-17 NOTE — TELEPHONE ENCOUNTER
Yes, I would like to see Baldemar to verify his  need for medication and the dose.  We may need to make some changes.  Please  schedule as soon as possible.  His wife states he is out of the extended release, but the prescription refill request is for the immediate release, I am uncertain as to just worries at.  Will need to see in clinic

## 2018-04-19 NOTE — TELEPHONE ENCOUNTER
Called patient and left message to return call and speak with any .     Thank you  Talat Olea  Patient Representative

## 2018-04-25 ENCOUNTER — OFFICE VISIT (OUTPATIENT)
Dept: FAMILY MEDICINE | Facility: CLINIC | Age: 28
End: 2018-04-25
Payer: COMMERCIAL

## 2018-04-25 VITALS
BODY MASS INDEX: 23.68 KG/M2 | SYSTOLIC BLOOD PRESSURE: 126 MMHG | WEIGHT: 174.6 LBS | DIASTOLIC BLOOD PRESSURE: 80 MMHG | OXYGEN SATURATION: 100 % | HEART RATE: 90 BPM | TEMPERATURE: 97.4 F

## 2018-04-25 DIAGNOSIS — F90.0 ATTENTION DEFICIT HYPERACTIVITY DISORDER (ADHD), PREDOMINANTLY INATTENTIVE TYPE: Primary | ICD-10-CM

## 2018-04-25 PROCEDURE — 99213 OFFICE O/P EST LOW 20 MIN: CPT | Performed by: NURSE PRACTITIONER

## 2018-04-25 RX ORDER — DEXTROAMPHETAMINE SACCHARATE, AMPHETAMINE ASPARTATE, DEXTROAMPHETAMINE SULFATE AND AMPHETAMINE SULFATE 3.75; 3.75; 3.75; 3.75 MG/1; MG/1; MG/1; MG/1
TABLET ORAL
Qty: 60 TABLET | Refills: 0 | Status: SHIPPED | OUTPATIENT
Start: 2018-04-25 | End: 2018-05-19

## 2018-04-25 RX ORDER — DEXTROAMPHETAMINE SACCHARATE, AMPHETAMINE ASPARTATE MONOHYDRATE, DEXTROAMPHETAMINE SULFATE AND AMPHETAMINE SULFATE 5; 5; 5; 5 MG/1; MG/1; MG/1; MG/1
20 CAPSULE, EXTENDED RELEASE ORAL DAILY
Qty: 30 CAPSULE | Refills: 0 | Status: SHIPPED | OUTPATIENT
Start: 2018-04-25 | End: 2018-05-19

## 2018-04-25 RX ORDER — DEXTROAMPHETAMINE SACCHARATE, AMPHETAMINE ASPARTATE MONOHYDRATE, DEXTROAMPHETAMINE SULFATE AND AMPHETAMINE SULFATE 5; 5; 5; 5 MG/1; MG/1; MG/1; MG/1
20 CAPSULE, EXTENDED RELEASE ORAL 2 TIMES DAILY
Qty: 30 CAPSULE | Refills: 0 | Status: SHIPPED | OUTPATIENT
Start: 2018-04-25 | End: 2018-04-25

## 2018-04-25 NOTE — PROGRESS NOTES
SUBJECTIVE:   Baldemar Johnson is a 28 year old male who presents to clinic today for the following health issues:      Medication Followup of Adderall    Taking Medication as prescribed: NO-been off for about 1 week    Side Effects:  None    Medication Helping Symptoms:  yes       Baldeamr is on Adderall for management of ADD.  He has difficulty focusing and staying on task without it.  He has been taking Adderall XR 20 mg in the morning, then had 15 mg of immediate release at noon, and then again later in the day, because he is working 12 hour shifts, and has been having difficulty at work if he does not have the medication maintaining focus and productivity into the evening.  He presented to clinic today to discuss possibly having 2 doses of extended release medication instead of  one extended release and 2 doses of immediate release.  He denies adverse side effects to the medication.  He states without it he just does not feel able to perform as he should, he also has had poor appetite.  He and his wife are both under a lot of stress at home, and have a lot of responsibility there are taking care of their little boy who has cerebral palsy and is quite challenged and challenging.    Problem list and histories reviewed & adjusted, as indicated.  Additional history: as documented    BP Readings from Last 3 Encounters:   04/25/18 126/80   01/04/18 120/68   07/28/17 120/68    Wt Readings from Last 3 Encounters:   04/25/18 174 lb 9.6 oz (79.2 kg)   01/04/18 175 lb 12.8 oz (79.7 kg)   07/28/17 185 lb (83.9 kg)                    Reviewed and updated as needed this visit by clinical staff       Reviewed and updated as needed this visit by Provider         ROS:  Constitutional, HEENT, cardiovascular, pulmonary, gi and gu systems are negative, except as otherwise noted.    OBJECTIVE:     /80  Pulse 90  Temp 97.4  F (36.3  C) (Temporal)  Wt 174 lb 9.6 oz (79.2 kg)  SpO2 100%  BMI 23.68 kg/m2  Body mass index is 23.68  kg/(m^2).   GENERAL: Well-nourished, well-hydrated.  He appears tired  NECK: no adenopathy, no asymmetry, masses, or scars and thyroid normal to palpation  RESP: lungs clear to auscultation - no rales, rhonchi or wheezes  CV: regular rate and rhythm, normal S1 S2, no S3 or S4, no murmur, click or rub, no peripheral edema and peripheral pulses strong  PSYCH: mentation appears normal, affect normal/bright        ASSESSMENT/PLAN:     Problem List Items Addressed This Visit     None      Visit Diagnoses     Attention deficit hyperactivity disorder (ADHD), predominantly inattentive type    -  Primary    Relevant Medications    amphetamine-dextroamphetamine (ADDERALL XR) 20 MG per 24 hr capsule    amphetamine-dextroamphetamine (ADDERALL) 15 MG per tablet           BP Screening:   Last 3 BP Readings:    BP Readings from Last 3 Encounters:   04/25/18 126/80   01/04/18 120/68   07/28/17 120/68       The following was recommended to the patient:  Re-screen BP within a year and recommended lifestyle modifications    We initially were going to try Adderall XR 20 mg twice daily.  Unfortunately, this is not reimbursed by insurance.  Will resume his previous regimen of Adderall XR 20 mg in the morning, Adderall XR 15 mg in the early afternoon, and then may repeat for a second dose later in the day when he is working the late hours    SOLITARIO Hogan [facility]

## 2018-04-25 NOTE — MR AVS SNAPSHOT
"              After Visit Summary   2018    Baldemar Johnson    MRN: [id number]           Patient Information     Date Of Birth          1990        Visit Information        Provider Department      2018 8:30 AM Liza Bhatti APRN CNP [facility]        Today's Diagnoses     Attention deficit hyperactivity disorder (ADHD), predominantly inattentive type    -  1       Follow-ups after your visit        Who to contact     If you have questions or need follow up information about today's clinic visit or your schedule please contact [facility] directly at [phone].  Normal or non-critical lab and imaging results will be communicated to you by "[employer]hart, letter or phone within 4 business days after the clinic has received the results. If you do not hear from us within 7 days, please contact the clinic through "[employer]"hart or phone. If you have a critical or abnormal lab result, we will notify you by phone as soon as possible.  Submit refill requests through [employer] or call your pharmacy and they will forward the refill request to us. Please allow 3 business days for your refill to be completed.          Additional Information About Your Visit        MyChart Information     [employer] lets you send messages to your doctor, view your test results, renew your prescriptions, schedule appointments and more. To sign up, go to [email or web address] . Click on \"Log in\" on the left side of the screen, which will take you to the Welcome page. Then click on \"Sign up Now\" on the right side of the page.     You will be asked to enter the access code listed below, as well as some personal information. Please follow the directions to create your username and password.     Your access code is: [email or web address]  Expires: 2018 10:15 AM     Your access code will  in 90 days. If you need help or a new code, please call your [facility] or [phone].        Care EveryWhere ID     This " is your Care EveryWhere ID. This could be used by other organizations to access your [place] medical records  [id number]        Your Vitals Were     Pulse Temperature Pulse Oximetry BMI (Body Mass Index)          90 97.4  F (36.3  C) (Temporal) 100% 23.68 kg/m2         Blood Pressure from Last 3 Encounters:   04/25/18 126/80   01/04/18 120/68   07/28/17 120/68    Weight from Last 3 Encounters:   04/25/18 174 lb 9.6 oz (79.2 kg)   01/04/18 175 lb 12.8 oz (79.7 kg)   07/28/17 185 lb (83.9 kg)              Today, you had the following     No orders found for display         Today's Medication Changes          These changes are accurate as of 4/25/18  1:01 PM.  If you have any questions, ask your nurse or doctor.               These medicines have changed or have updated prescriptions.        Dose/Directions    * amphetamine-dextroamphetamine 20 MG per 24 hr capsule   Commonly known as:  ADDERALL XR   This may have changed:  Another medication with the same name was removed. Continue taking this medication, and follow the directions you see here.   Used for:  Attention deficit hyperactivity disorder (ADHD), predominantly inattentive type   Changed by:  Liza Bhatti APRN CNP        Dose:  20 mg   Take 1 capsule (20 mg) by mouth daily   Quantity:  30 capsule   Refills:  0       * amphetamine-dextroamphetamine 15 MG per tablet   Commonly known as:  ADDERALL   This may have changed:  Another medication with the same name was removed. Continue taking this medication, and follow the directions you see here.   Used for:  Attention deficit hyperactivity disorder (ADHD), predominantly inattentive type   Changed by:  Liza Bhatti APRN CNP        Take 1 capsule by mouth daily in the early afternoon; may take a second capsule in early evening when working late   Quantity:  60 tablet   Refills:  0       * Notice:  This list has 2 medication(s) that are the same as other medications prescribed for you. Read the  directions carefully, and ask your doctor or other care provider to review them with you.         Where to get your medicines      Some of these will need a paper prescription and others can be bought over the counter.  Ask your nurse if you have questions.     Bring a paper prescription for each of these medications     amphetamine-dextroamphetamine 15 MG per tablet    amphetamine-dextroamphetamine 20 MG per 24 hr capsule                Primary Care Provider Office Phone # Fax #    Liza Bhatti, APRN [facility] [phone]       [address] [place] [address]        Equal Access to Services     : Hadii aad ku hadasho Soomaali, waaxda luqadaha, qaybta kaalmada adeegyada, waxay idiin hayaadorina [place]ondon . So [facility] [phone].    ATENCIÓN: Si habla español, tiene a jamil disposición servicios gratuitos de asistencia lingüística. [facility] [phone].    We comply with applicable federal civil rights laws and [place] laws. We do not discriminate on the basis of race, color, national origin, age, disability, sex, sexual orientation, or gender identity.            Thank you!     Thank you for choosing [facility]  for your care. Our goal is always to provide you with excellent care. Hearing back from our patients is one way we can continue to improve our services. Please take a few minutes to complete the written survey that you may receive in the mail after your visit with us. Thank you!             Your Updated Medication List - Protect others around you: Learn how to safely use, store and throw away your medicines at www.disposemymeds.org.          This list is accurate as of 4/25/18  1:01 PM.  Always use your most recent med list.                   Brand Name Dispense Instructions for use Diagnosis    * amphetamine-dextroamphetamine 20 MG per 24 hr capsule    ADDERALL XR    30 capsule    Take 1 capsule (20 mg) by mouth daily    Attention deficit hyperactivity  disorder (ADHD), predominantly inattentive type       * amphetamine-dextroamphetamine 15 MG per tablet    ADDERALL    60 tablet    Take 1 capsule by mouth daily in the early afternoon; may take a second capsule in early evening when working late    Attention deficit hyperactivity disorder (ADHD), predominantly inattentive type       ibuprofen 200 MG capsule      1-3 CAPS EVERY 6 HOURS AS NEEDED for pain/fever        TYLENOL 325 MG tablet   Generic drug:  acetaminophen      2 TABLETS EVERY 4 HOURS AS NEEDED for pain/fever        * Notice:  This list has 2 medication(s) that are the same as other medications prescribed for you. Read the directions carefully, and ask your doctor or other care provider to review them with you.

## 2018-04-26 ASSESSMENT — PATIENT HEALTH QUESTIONNAIRE - PHQ9: SUM OF ALL RESPONSES TO PHQ QUESTIONS 1-9: 12

## 2018-05-19 DIAGNOSIS — F90.0 ATTENTION DEFICIT HYPERACTIVITY DISORDER (ADHD), PREDOMINANTLY INATTENTIVE TYPE: ICD-10-CM

## 2018-05-21 NOTE — TELEPHONE ENCOUNTER
Adderall XR 20 MG       Last Written Prescription Date:  4/25/18  Last Fill Quantity: 30,   # refills: 0  Last Office Visit: 4/25/18/  Future Office visit:       Routing refill request to provider for review/approval because:  Drug not on the [facility], [facility] or [facility] refill protocol or controlled substance  Adderall 15 MG       Last Written Prescription Date:  4/25/18  Last Fill Quantity: 60,   # refills: 3  Last Office Visit: 4/25/18  Future Office visit:       Routing refill request to provider for review/approval because:  Drug not on the [facility], [facility] or [facility] refill protocol or controlled substance

## 2018-05-22 RX ORDER — DEXTROAMPHETAMINE SACCHARATE, AMPHETAMINE ASPARTATE, DEXTROAMPHETAMINE SULFATE AND AMPHETAMINE SULFATE 3.75; 3.75; 3.75; 3.75 MG/1; MG/1; MG/1; MG/1
TABLET ORAL
Qty: 60 TABLET | Refills: 0 | Status: SHIPPED | OUTPATIENT
Start: 2018-05-22 | End: 2018-06-25

## 2018-05-22 RX ORDER — DEXTROAMPHETAMINE SACCHARATE, AMPHETAMINE ASPARTATE MONOHYDRATE, DEXTROAMPHETAMINE SULFATE AND AMPHETAMINE SULFATE 5; 5; 5; 5 MG/1; MG/1; MG/1; MG/1
20 CAPSULE, EXTENDED RELEASE ORAL DAILY
Qty: 30 CAPSULE | Refills: 0 | Status: SHIPPED | OUTPATIENT
Start: 2018-05-22 | End: 2018-06-25

## 2018-06-25 DIAGNOSIS — F90.0 ATTENTION DEFICIT HYPERACTIVITY DISORDER (ADHD), PREDOMINANTLY INATTENTIVE TYPE: ICD-10-CM

## 2018-06-25 NOTE — TELEPHONE ENCOUNTER
Adderall 15 MG       Last Written Prescription Date:  5/22/18  Last Fill Quantity: 60,   # refills: 0  Last Office Visit: 4/25/18  Future Office visit:       Routing refill request to provider for review/approval because:  Drug not on the [facility], [facility] or [facility] refill protocol or controlled substance  Adderall 20 MG       Last Written Prescription Date:  5/22/18  Last Fill Quantity: 60,   # refills: 0  Last Office Visit: 4/25/18  Future Office visit:       Routing refill request to provider for review/approval because:  Drug not on the [facility], [facility] or [facility] refill protocol or controlled substance

## 2018-06-27 RX ORDER — DEXTROAMPHETAMINE SACCHARATE, AMPHETAMINE ASPARTATE MONOHYDRATE, DEXTROAMPHETAMINE SULFATE AND AMPHETAMINE SULFATE 5; 5; 5; 5 MG/1; MG/1; MG/1; MG/1
20 CAPSULE, EXTENDED RELEASE ORAL DAILY
Qty: 30 CAPSULE | Refills: 0 | Status: SHIPPED | OUTPATIENT
Start: 2018-06-27 | End: 2018-07-20

## 2018-06-27 RX ORDER — DEXTROAMPHETAMINE SACCHARATE, AMPHETAMINE ASPARTATE, DEXTROAMPHETAMINE SULFATE AND AMPHETAMINE SULFATE 3.75; 3.75; 3.75; 3.75 MG/1; MG/1; MG/1; MG/1
TABLET ORAL
Qty: 60 TABLET | Refills: 0 | Status: SHIPPED | OUTPATIENT
Start: 2018-06-27 | End: 2018-07-20

## 2018-07-20 DIAGNOSIS — F90.0 ATTENTION DEFICIT HYPERACTIVITY DISORDER (ADHD), PREDOMINANTLY INATTENTIVE TYPE: ICD-10-CM

## 2018-07-20 NOTE — TELEPHONE ENCOUNTER
Requested Prescriptions   Pending Prescriptions Disp Refills     amphetamine-dextroamphetamine (ADDERALL XR) 20 MG per 24 hr capsule 30 capsule 0     Sig: Take 1 capsule (20 mg) by mouth daily    There is no refill protocol information for this order   Last Written Prescription Date:  06/27/2018  Last Fill Quantity: 30,  # refills: 0   Last office visit: 4/25/2018 with prescribing provider:  04/25/2018   Future Office Visit:         amphetamine-dextroamphetamine (ADDERALL) 15 MG per tablet 60 tablet 0     Sig: Take 1 capsule by mouth daily in the early afternoon; may take a second capsule in early evening when working late    There is no refill protocol information for this order        Last Written Prescription Date:  06/27/2018  Last Fill Quantity: 60,  # refills: 0   Last office visit: 4/25/2018 with prescribing provider:  04/25/2018   Future Office Visit:        DR. Soler please address in absence of Dr. Bhatti out of office until 7/24.     Joanna Myles MA

## 2018-07-24 RX ORDER — DEXTROAMPHETAMINE SACCHARATE, AMPHETAMINE ASPARTATE MONOHYDRATE, DEXTROAMPHETAMINE SULFATE AND AMPHETAMINE SULFATE 5; 5; 5; 5 MG/1; MG/1; MG/1; MG/1
20 CAPSULE, EXTENDED RELEASE ORAL DAILY
Qty: 30 CAPSULE | Refills: 0 | Status: SHIPPED | OUTPATIENT
Start: 2018-07-24 | End: 2018-08-16

## 2018-07-24 RX ORDER — DEXTROAMPHETAMINE SACCHARATE, AMPHETAMINE ASPARTATE, DEXTROAMPHETAMINE SULFATE AND AMPHETAMINE SULFATE 3.75; 3.75; 3.75; 3.75 MG/1; MG/1; MG/1; MG/1
TABLET ORAL
Qty: 60 TABLET | Refills: 0 | Status: SHIPPED | OUTPATIENT
Start: 2018-07-24 | End: 2018-08-16

## 2018-08-08 NOTE — NURSING NOTE
"Chief Complaint   Patient presents with     Shoulder Pain       Initial There were no vitals taken for this visit. Estimated body mass index is 25.26 kg/(m^2) as calculated from the following:    Height as of 2/3/17: 6' 2.61\" (1.895 m).    Weight as of 2/3/17: 200 lb (90.7 kg).  Medication Reconciliation: complete  " Refilled patients torsemide per Eprescribe protocol to patient's preferred pharmacy.

## 2018-08-16 DIAGNOSIS — F90.0 ATTENTION DEFICIT HYPERACTIVITY DISORDER (ADHD), PREDOMINANTLY INATTENTIVE TYPE: ICD-10-CM

## 2018-08-16 RX ORDER — DEXTROAMPHETAMINE SACCHARATE, AMPHETAMINE ASPARTATE, DEXTROAMPHETAMINE SULFATE AND AMPHETAMINE SULFATE 3.75; 3.75; 3.75; 3.75 MG/1; MG/1; MG/1; MG/1
TABLET ORAL
Qty: 60 TABLET | Refills: 0 | Status: SHIPPED | OUTPATIENT
Start: 2018-08-16 | End: 2018-09-19 | Stop reason: DRUGHIGH

## 2018-08-16 RX ORDER — DEXTROAMPHETAMINE SACCHARATE, AMPHETAMINE ASPARTATE MONOHYDRATE, DEXTROAMPHETAMINE SULFATE AND AMPHETAMINE SULFATE 5; 5; 5; 5 MG/1; MG/1; MG/1; MG/1
20 CAPSULE, EXTENDED RELEASE ORAL DAILY
Qty: 30 CAPSULE | Refills: 0 | Status: SHIPPED | OUTPATIENT
Start: 2018-08-16 | End: 2018-09-19

## 2018-08-16 NOTE — TELEPHONE ENCOUNTER
Adderall XR 20 MG       Last Written Prescription Date:  7/24/18  Last Fill Quantity: 30,   # refills: 0  Last Office Visit: 4/25/18  Future Office visit:       Routing refill request to provider for review/approval because:  Drug not on the [facility], [facility] or [facility] refill protocol or controlled substance    Adderall XR 15 MG       Last Written Prescription Date:  7/24/18  Last Fill Quantity: 60,   # refills: 0  Last Office Visit: 4/25/18  Future Office visit:       Routing refill request to provider for review/approval because:  Drug not on the [facility], [facility] or [facility] refill protocol or controlled substance

## 2018-08-20 ENCOUNTER — TELEPHONE (OUTPATIENT)
Dept: FAMILY MEDICINE | Facility: CLINIC | Age: 28
End: 2018-08-20

## 2018-09-19 ENCOUNTER — OFFICE VISIT (OUTPATIENT)
Dept: FAMILY MEDICINE | Facility: CLINIC | Age: 28
End: 2018-09-19
Payer: COMMERCIAL

## 2018-09-19 VITALS
WEIGHT: 170.3 LBS | TEMPERATURE: 97.5 F | HEART RATE: 100 BPM | RESPIRATION RATE: 24 BRPM | SYSTOLIC BLOOD PRESSURE: 126 MMHG | BODY MASS INDEX: 21.18 KG/M2 | OXYGEN SATURATION: 100 % | HEIGHT: 75 IN | DIASTOLIC BLOOD PRESSURE: 78 MMHG

## 2018-09-19 DIAGNOSIS — Z79.899 CONTROLLED SUBSTANCE AGREEMENT SIGNED: Primary | ICD-10-CM

## 2018-09-19 DIAGNOSIS — F90.0 ATTENTION DEFICIT HYPERACTIVITY DISORDER (ADHD), PREDOMINANTLY INATTENTIVE TYPE: ICD-10-CM

## 2018-09-19 PROCEDURE — 99213 OFFICE O/P EST LOW 20 MIN: CPT | Performed by: NURSE PRACTITIONER

## 2018-09-19 PROCEDURE — 99000 SPECIMEN HANDLING OFFICE-LAB: CPT | Performed by: NURSE PRACTITIONER

## 2018-09-19 PROCEDURE — 80307 DRUG TEST PRSMV CHEM ANLYZR: CPT | Mod: 90 | Performed by: NURSE PRACTITIONER

## 2018-09-19 RX ORDER — DEXTROAMPHETAMINE SACCHARATE, AMPHETAMINE ASPARTATE MONOHYDRATE, DEXTROAMPHETAMINE SULFATE AND AMPHETAMINE SULFATE 5; 5; 5; 5 MG/1; MG/1; MG/1; MG/1
20 CAPSULE, EXTENDED RELEASE ORAL DAILY
Qty: 30 CAPSULE | Refills: 0 | Status: SHIPPED | OUTPATIENT
Start: 2018-09-19 | End: 2018-11-02

## 2018-09-19 RX ORDER — DEXTROAMPHETAMINE SACCHARATE, AMPHETAMINE ASPARTATE, DEXTROAMPHETAMINE SULFATE AND AMPHETAMINE SULFATE 3.75; 3.75; 3.75; 3.75 MG/1; MG/1; MG/1; MG/1
TABLET ORAL
Qty: 60 TABLET | Refills: 0 | Status: CANCELLED | OUTPATIENT
Start: 2018-09-19

## 2018-09-19 RX ORDER — DEXTROAMPHETAMINE SACCHARATE, AMPHETAMINE ASPARTATE MONOHYDRATE, DEXTROAMPHETAMINE SULFATE AND AMPHETAMINE SULFATE 3.75; 3.75; 3.75; 3.75 MG/1; MG/1; MG/1; MG/1
15 CAPSULE, EXTENDED RELEASE ORAL DAILY
Qty: 30 CAPSULE | Refills: 0 | Status: SHIPPED | OUTPATIENT
Start: 2018-09-19 | End: 2018-11-02

## 2018-09-19 NOTE — MR AVS SNAPSHOT
"              After Visit Summary   9/19/2018    Baldemar Johnson    MRN: [id number]           Patient Information     Date Of Birth          1990        Visit Information        Provider Department      9/19/2018 10:15 AM Liza Bhatti APRN CNP [facility]        Today's Diagnoses     Controlled substance agreement signed    -  1    Attention deficit hyperactivity disorder (ADHD), predominantly inattentive type           Follow-ups after your visit        Who to contact     If you have questions or need follow up information about today's clinic visit or your schedule please contact [facility] directly at [phone].  Normal or non-critical lab and imaging results will be communicated to you by MyChart, letter or phone within 4 business days after the clinic has received the results. If you do not hear from us within 7 days, please contact the clinic through MyChart or phone. If you have a critical or abnormal lab result, we will notify you by phone as soon as possible.  Submit refill requests through [employer] or call your pharmacy and they will forward the refill request to us. Please allow 3 business days for your refill to be completed.          Additional Information About Your Visit        Care EveryWhere ID     This is your Care EveryWhere ID. This could be used by other organizations to access your [place] medical records  [id number]        Your Vitals Were     Pulse Temperature Respirations Height Pulse Oximetry BMI (Body Mass Index)    100 97.5  F (36.4  C) (Temporal) 24 6' 3\" (1.905 m) 100% 21.29 kg/m2       Blood Pressure from Last 3 Encounters:   09/19/18 126/78   04/25/18 126/80   01/04/18 120/68    Weight from Last 3 Encounters:   09/19/18 170 lb 4.8 oz (77.2 kg)   04/25/18 174 lb 9.6 oz (79.2 kg)   01/04/18 175 lb 12.8 oz (79.7 kg)              We Performed the Following     Drug  Screen Comprehensive , Urine with Reported Meds (MedTox) (Pain Care Package)     "      Today's Medication Changes          These changes are accurate as of 9/19/18 11:21 AM.  If you have any questions, ask your nurse or doctor.               These medicines have changed or have updated prescriptions.        Dose/Directions    * amphetamine-dextroamphetamine 20 MG per 24 hr capsule   Commonly known as:  ADDERALL XR   This may have changed:    - Another medication with the same name was added. Make sure you understand how and when to take each.  - Another medication with the same name was removed. Continue taking this medication, and follow the directions you see here.   Used for:  Attention deficit hyperactivity disorder (ADHD), predominantly inattentive type   Changed by:  Liza Bhatti APRN CNP        Dose:  20 mg   Take 1 capsule (20 mg) by mouth daily   Quantity:  30 capsule   Refills:  0       * amphetamine-dextroamphetamine 15 MG per 24 hr capsule   Commonly known as:  ADDERALL XR   This may have changed:  You were already taking a medication with the same name, and this prescription was added. Make sure you understand how and when to take each.   Used for:  Attention deficit hyperactivity disorder (ADHD), predominantly inattentive type   Replaces:  amphetamine-dextroamphetamine 15 MG per tablet   Changed by:  Liza Bhatti APRN CNP        Dose:  15 mg   Take 1 capsule (15 mg) by mouth daily In early afternoon   Quantity:  30 capsule   Refills:  0       * Notice:  This list has 2 medication(s) that are the same as other medications prescribed for you. Read the directions carefully, and ask your doctor or other care provider to review them with you.      Stop taking these medicines if you haven't already. Please contact your care team if you have questions.     amphetamine-dextroamphetamine 15 MG per tablet   Commonly known as:  ADDERALL   Replaced by:  amphetamine-dextroamphetamine 15 MG per 24 hr capsule   You also have another medication with the same name that you need to  continue taking as instructed.   Stopped by:  Liza Bhatti APRN CNP                Where to get your medicines      Some of these will need a paper prescription and others can be bought over the counter.  Ask your nurse if you have questions.     Bring a paper prescription for each of these medications     amphetamine-dextroamphetamine 15 MG per 24 hr capsule    amphetamine-dextroamphetamine 20 MG per 24 hr capsule                Primary Care Provider Office Phone # Fax #    SOLITARIO Hogan [phone]       [address] [place] [address]        Equal Access to Services     [facility]: Hadii aad ku hadasho Soomaali, waaxda luqadaha, qaybta kaalmada adeegyada, waxay idiin hayirma gill kharaboris london . So [facility] [phone].    ATENCIÓN: Si habla español, tiene a jamil disposición servicios gratuitos de asistencia lingüística. [facility] [phone].    We comply with applicable federal civil rights laws and [place] laws. We do not discriminate on the basis of race, color, national origin, age, disability, sex, sexual orientation, or gender identity.            Thank you!     Thank you for choosing [facility]  for your care. Our goal is always to provide you with excellent care. Hearing back from our patients is one way we can continue to improve our services. Please take a few minutes to complete the written survey that you may receive in the mail after your visit with us. Thank you!             Your Updated Medication List - Protect others around you: Learn how to safely use, store and throw away your medicines at www.disposemymeds.org.          This list is accurate as of 9/19/18 11:21 AM.  Always use your most recent med list.                   Brand Name Dispense Instructions for use Diagnosis    * amphetamine-dextroamphetamine 20 MG per 24 hr capsule    ADDERALL XR    30 capsule    Take 1 capsule (20 mg) by mouth daily    Attention deficit hyperactivity  disorder (ADHD), predominantly inattentive type       * amphetamine-dextroamphetamine 15 MG per 24 hr capsule    ADDERALL XR    30 capsule    Take 1 capsule (15 mg) by mouth daily In early afternoon    Attention deficit hyperactivity disorder (ADHD), predominantly inattentive type       ibuprofen 200 MG capsule      1-3 CAPS EVERY 6 HOURS AS NEEDED for pain/fever        TYLENOL 325 MG tablet   Generic drug:  acetaminophen      2 TABLETS EVERY 4 HOURS AS NEEDED for pain/fever        * Notice:  This list has 2 medication(s) that are the same as other medications prescribed for you. Read the directions carefully, and ask your doctor or other care provider to review them with you.

## 2018-09-19 NOTE — LETTER
My Depression Action Plan  Name: Baldemar Johnson   Date of Birth 1990  Date: 9/19/2018    My doctor: Liza Bhatti   My clinic: [address] [address] [phone]          GREEN    ZONE   Good Control    What it looks like:     Things are going generally well. You have normal up s and down s. You may even feel depressed from time to time, but bad moods usually last less than a day.   What you need to do:  1. Continue to care for yourself (see self care plan)  2. Check your depression survival kit and update it as needed  3. Follow your physician s recommendations including any medication.  4. Do not stop taking medication unless you consult with your physician first.           YELLOW         ZONE Getting Worse    What it looks like:     Depression is starting to interfere with your life.     It may be hard to get out of bed; you may be starting to isolate yourself from others.    Symptoms of depression are starting to last most all day and this has happened for several days.     You may have suicidal thoughts but they are not constant.   What you need to do:     1. Call your care team, your response to treatment will improve if you keep your care team informed of your progress. Yellow periods are signs an adjustment may need to be made.     2. Continue your self-care, even if you have to fake it!    3. Talk to someone in your support network    4. Open up your depression survival kit           RED    ZONE Medical Alert - Get Help    What it looks like:     Depression is seriously interfering with your life.     You may experience these or other symptoms: You can t get out of bed most days, can t work or engage in other necessary activities, you have trouble taking care of basic hygiene, or basic responsibilities, thoughts of suicide or death that will not go away, self-injurious behavior.     What you need to do:  1. Call your care team and  request a same-day appointment. If they are not available (weekends or after hours) call your local crisis line, emergency room or 911.            Depression Self Care Plan / Survival Kit    Self-Care for Depression  Here s the deal. Your body and mind are really not as separate as most people think.  What you do and think affects how you feel and how you feel influences what you do and think. This means if you do things that people who feel good do, it will help you feel better.  Sometimes this is all it takes.  There is also a place for medication and therapy depending on how severe your depression is, so be sure to consult with your medical provider and/ or Behavioral Health Consultant if your symptoms are worsening or not improving.     In order to better manage my stress, I will:    Exercise  Get some form of exercise, every day. This will help reduce pain and release endorphins, the  feel good  chemicals in your brain. This is almost as good as taking antidepressants!  This is not the same as joining a gym and then never going! (they count on that by the way ) It can be as simple as just going for a walk or doing some gardening, anything that will get you moving.      Hygiene   Maintain good hygiene (Get out of bed in the morning, Make your bed, Brush your teeth, Take a shower, and Get dressed like you were going to work, even if you are unemployed).  If your clothes don't fit try to get ones that do.    Diet  I will strive to eat foods that are good for me, drink plenty of water, and avoid excessive sugar, caffeine, alcohol, and other mood-altering substances.  Some foods that are helpful in depression are: complex carbohydrates, B vitamins, flaxseed, fish or fish oil, fresh fruits and vegetables.    Psychotherapy  I agree to participate in Individual Therapy (if recommended).    Medication  If prescribed medications, I agree to take them.  Missing doses can result in serious side effects.  I understand that  drinking alcohol, or other illicit drug use, may cause potential side effects.  I will not stop my medication abruptly without first discussing it with my provider.    Staying Connected With Others  I will stay in touch with my friends, family members, and my primary care provider/team.    Use your imagination  Be creative.  We all have a creative side; it doesn t matter if it s oil painting, sand castles, or mud pies! This will also kick up the endorphins.    Witness Beauty  (AKA stop and smell the roses) Take a look outside, even in mid-winter. Notice colors, textures. Watch the squirrels and birds.     Service to others  Be of service to others.  There is always someone else in need.  By helping others we can  get out of ourselves  and remember the really important things.  This also provides opportunities for practicing all the other parts of the program.    Humor  Laugh and be silly!  Adjust your TV habits for less news and crime-drama and more comedy.    Control your stress  Try breathing deep, massage therapy, biofeedback, and meditation. Find time to relax each day.     My support system    Clinic Contact:  Phone number:    Contact 1:  Phone number:    Contact 2:  Phone number:    [employer]eterian/:  Phone number:    Therapist:  Phone number:    Local crisis center:    Phone number:    Other community support:  Phone number:

## 2018-09-19 NOTE — LETTER
[facility]    09/19/18    Patient: Baldemar Johnson  YOB: 1990  Medical Record Number: [id number]                                                                  Controlled Substance Agreement  I understand that my care provider has prescribed controlled substances (narcotics, tranquilizers, and/or stimulants) to help manage my condition(s).  I am taking this medicine to help me function or work.  I know that this is strong medicine.  It could have serious side effects and even cause a dependency on the drug.  If I stop these medicines suddenly, I could have severe withdrawal symptoms.    The risks, benefits, and side effects of these medication(s) were explained to me.  I agree that:  1. I will take part in other treatments as advised by my provider.  This may be psychiatry or counseling, physical therapy, behavioral therapy, group treatment, or a referral to a pain clinic.  I will reduce or stop my medicine when my provider tells me to do so.   2. I will take my medicines as prescribed.  I will not change the dose or schedule unless my provider tells me to.  There will be no refills if I  run out early.   I may be contacted at any time without warning and asked to complete a drug test or pill count.   3. I will keep all my appointments at the clinic.  If I miss appointments or fail to follow instructions, my provider may stop my medicine.  4. I will not ask other providers to prescribe controlled substances. And I will not accept controlled substances from other people. If I need another prescribed controlled substance for a new reason, I will notify my provider within one business day.  5. If I enroll in the [place] Medical Marijuana program, I will tell my provider.  I will also sign an agreement to share my medical records with my provider.  6. I will use one pharmacy to fill all of my controlled substance prescriptions.  If my prescription is mailed to my pharmacy, it  may take 5 to 7 days for my medicine to be ready.  7. I understand that my provider, clinic care team, and pharmacy can track controlled substance prescriptions from other providers through a central database (prescription monitoring program).  8. I will bring in my list of medications (or my medicine bottles) each time I come to the clinic.  [id number] REV-  07/2018                                                                                                                                   Page 1 of 2      [facility]    09/19/18    Patient: Baldemar Johnson  YOB: 1990  Medical Record Number: [id number]    9. Refills of controlled substances will be made only during office hours.  It is up to me to make sure that I do not run out of my medicines on weekends or holidays.    10. I am responsible for my prescriptions.  If the medicine/prescription is lost or stolen, it will not be replaced.   I also agree not to share these medicines with anyone.  11. I agree to not use ANY illegal or recreational drugs.  This includes marijuana, cocaine, bath salts or other drugs.  I agree not to use alcohol unless my provider says I may.  I agree to give urine samples whenever asked.  If I fail to give a urine sample, the provider may stop my medicine.     12. I will tell my nurse or provider right away if I become pregnant or have a new medical problem treated outside of [facility].  13. I understand that this medicine can affect my thinking and judgment.  It may be unsafe for me to drive, use machinery and do dangerous tasks.  I will not do any of these things until I know how the medicine affects me.  If my dose changes, I will wait to see how it affects me.  I will contact my provider if I have concerns about medicine side effects.  I understand that if I do not follow any of the conditions above, my prescriptions or treatment may be stopped.    I agree that my provider, clinic care  team, and pharmacy may work with any city, state or federal law enforcement agency that investigates the misuse, sale, or other diversion of my controlled medicine. I will allow my provider to discuss my care with or share a copy of this agreement with any other treating provider, pharmacy or emergency room where I receive care.  I agree to give up (waive) any right of privacy or confidentiality with respect to these authorizations.   I have read this agreement and have asked questions about anything I did not understand.   ___________________________________    ___________________________  Patient Signature                                                           Date and Time  ___________________________________     ____________________________  Witness                                                                            Date and Time  ___________________________________  SOLITARIO Hogan CNP  [id number] REV-  07/2018                                                                                                                                                   Page 2 of 2

## 2018-09-19 NOTE — PROGRESS NOTES
"  SUBJECTIVE:   Baldemar Johnson is a 28 year old male who presents to clinic today for the following health issues:      Medication Followup of Adderall    Taking Medication as prescribed: yes    Side Effects:  None    Medication Helping Symptoms:  yes       Baldemar is here for follow-up management of ADHD.  He was taking Adderall XR 20 mg in the morning, had prescription for Adderall X are 50 mg in the early afternoon, and also had one to take later in the evening because he was working extended hours.  However, he has recently lost his job, so would not be needing that extra Adderall at this time.  He and his wife are having significant marital problems, financial stressors, they have a handicap child.  Presently the child was taken from their home by CPS, because of their quarreling.  He does have a history of depression, denies feeling depressed at this time, just overwhelmed and feels this is situational.  They are going to counseling, uncertain at this time as to his personal commitment to his family.  He is going to try to get his job back, feeling that he was terminated unfairly.    Problem list and histories reviewed & adjusted, as indicated.  Additional history: as documented    BP Readings from Last 3 Encounters:   09/19/18 126/78   04/25/18 126/80   01/04/18 120/68    Wt Readings from Last 3 Encounters:   09/19/18 170 lb 4.8 oz (77.2 kg)   04/25/18 174 lb 9.6 oz (79.2 kg)   01/04/18 175 lb 12.8 oz (79.7 kg)                    Reviewed and updated as needed this visit by clinical staff       Reviewed and updated as needed this visit by Provider         ROS:  Constitutional, HEENT, cardiovascular, pulmonary, gi and gu systems are negative, except as otherwise noted.    OBJECTIVE:     /78  Pulse 100  Temp 97.5  F (36.4  C) (Temporal)  Resp 24  Ht 6' 3\" (1.905 m)  Wt 170 lb 4.8 oz (77.2 kg)  SpO2 100%  BMI 21.29 kg/m2  Body mass index is 21.29 kg/(m^2).   GENERAL: healthy, alert and no distress  EYES: " Eyes grossly normal to inspection, PERRL and conjunctivae and sclerae normal  NEURO: Normal strength and tone, mentation intact and speech normal  PSYCH: mentation appears normal, affect normal/bright        ASSESSMENT/PLAN:     Problem List Items Addressed This Visit        Medium    Controlled substance agreement signed - Primary    Relevant Orders    Drug  Screen Comprehensive , Urine with Reported Meds (MedTox) (Pain Care Package) (Completed)      Other Visit Diagnoses     Attention deficit hyperactivity disorder (ADHD), predominantly inattentive type        Relevant Medications    amphetamine-dextroamphetamine (ADDERALL XR) 20 MG per 24 hr capsule    amphetamine-dextroamphetamine (ADDERALL XR) 15 MG per 24 hr capsule    Other Relevant Orders    Drug  Screen Comprehensive , Urine with Reported Meds (MedTox) (Pain Care Package) (Completed)           Controlled substance agreement was reviewed and signed  Urine drug screen collected today  We will continue Adderall XR 20 mg in the morning and Adderall XR 15 mg in the afternoon  Encouraged to continue counseling  Follow-up in clinic in 1 month for recheck.    SOLITARIO Hogan [facility]

## 2018-09-22 LAB — PAIN DRUG SCR UR W RPTD MEDS: NORMAL

## 2018-10-29 DIAGNOSIS — F90.0 ATTENTION DEFICIT HYPERACTIVITY DISORDER (ADHD), PREDOMINANTLY INATTENTIVE TYPE: ICD-10-CM

## 2018-10-29 NOTE — LETTER
[address] [address]  Phone: [phone]  Fax: [phone]        October 31, 2018      Baldemar Johnson                                                                                                                                [address] [place] [id number]            Dear Mr. Johnson,    We are concerned about your health care.  We recently provided you with a medication refill.  Many medications require routine follow-up with your Doctor.      At this time we ask that: You schedule a routine office visit with your physician to follow your medication.    Your prescription: Has been refilled for 1 month so you may have time for the above noted follow-up.      Thank you,      Liza Bhatti/ [place] Care Team

## 2018-10-29 NOTE — TELEPHONE ENCOUNTER
Adderall XR 15 MG       Last Written Prescription Date:  9/19/18  Last Fill Quantity: 30,   # refills: 0  Last Office Visit: 9/19/18  Future Office visit:       Routing refill request to provider for review/approval because:  Drug not on the [facility], [facility] or [facility] refill protocol or controlled substance  Adderall XR 20 MG       Last Written Prescription Date:  9/19/18  Last Fill Quantity: 30,   # refills: 0  Last Office Visit: 9/19/18  Future Office visit:       Routing refill request to provider for review/approval because:  Drug not on the [facility], [facility] or [facility] refill protocol or controlled substance

## 2018-10-30 RX ORDER — DEXTROAMPHETAMINE SACCHARATE, AMPHETAMINE ASPARTATE MONOHYDRATE, DEXTROAMPHETAMINE SULFATE AND AMPHETAMINE SULFATE 5; 5; 5; 5 MG/1; MG/1; MG/1; MG/1
20 CAPSULE, EXTENDED RELEASE ORAL DAILY
Qty: 30 CAPSULE | Refills: 0 | OUTPATIENT
Start: 2018-10-30

## 2018-10-30 RX ORDER — DEXTROAMPHETAMINE SACCHARATE, AMPHETAMINE ASPARTATE MONOHYDRATE, DEXTROAMPHETAMINE SULFATE AND AMPHETAMINE SULFATE 3.75; 3.75; 3.75; 3.75 MG/1; MG/1; MG/1; MG/1
15 CAPSULE, EXTENDED RELEASE ORAL DAILY
Qty: 30 CAPSULE | Refills: 0 | OUTPATIENT
Start: 2018-10-30

## 2018-10-30 NOTE — TELEPHONE ENCOUNTER
Left message for patient to call back and speak with any .    Thank you,  Sara Salazar   for [facility]

## 2018-10-30 NOTE — TELEPHONE ENCOUNTER
"At his last visit, everything was not \"going fine \".  The agreement was that he would follow-up in 1 month to reassess.  On Tuesdays I have appointments at 5:15.  Otherwise, I also have 7:30 AM appointments every day except Monday.  "

## 2018-10-31 NOTE — TELEPHONE ENCOUNTER
2nd attempt to reach pt- left another message. Routing back to team to send letter.   Thank you,  Latrice Gant- Pt Rep.

## 2018-11-02 DIAGNOSIS — F90.0 ATTENTION DEFICIT HYPERACTIVITY DISORDER (ADHD), PREDOMINANTLY INATTENTIVE TYPE: ICD-10-CM

## 2018-11-02 RX ORDER — DEXTROAMPHETAMINE SACCHARATE, AMPHETAMINE ASPARTATE MONOHYDRATE, DEXTROAMPHETAMINE SULFATE AND AMPHETAMINE SULFATE 3.75; 3.75; 3.75; 3.75 MG/1; MG/1; MG/1; MG/1
15 CAPSULE, EXTENDED RELEASE ORAL DAILY
Qty: 30 CAPSULE | Refills: 0 | Status: SHIPPED | OUTPATIENT
Start: 2018-11-02 | End: 2018-12-13 | Stop reason: DRUGHIGH

## 2018-11-02 RX ORDER — DEXTROAMPHETAMINE SACCHARATE, AMPHETAMINE ASPARTATE MONOHYDRATE, DEXTROAMPHETAMINE SULFATE AND AMPHETAMINE SULFATE 5; 5; 5; 5 MG/1; MG/1; MG/1; MG/1
20 CAPSULE, EXTENDED RELEASE ORAL DAILY
Qty: 30 CAPSULE | Refills: 0 | Status: SHIPPED | OUTPATIENT
Start: 2018-11-02 | End: 2018-12-13

## 2018-11-02 NOTE — TELEPHONE ENCOUNTER
Adderall XR 20 MG       Last Written Prescription Date:  9-19-18  Last Fill Quantity: 30,   # refills: 0  Last Office Visit: 9-19-18  Future Office visit:    Next 5 appointments (look out 90 days)     Nov 15, 2018 10:00 AM CST   Office Visit with SOLITARIO Hogan CNP   [address] [address]   [phone]                   Routing refill request to provider for review/approval because:  Drug not on the [facility], [facility] or [facility] refill protocol or controlled substance  Adderall XR 15 MG       Last Written Prescription Date:  9-19-18  Last Fill Quantity: 30,   # refills: 0  Last Office Visit: 9-19-18  Future Office visit:    Next 5 appointments (look out 90 days)     Nov 15, 2018 10:00 AM CST   Office Visit with SOLITARIO Hogan CNP   [facility] ([address] [address]   [phone]                   Routing refill request to provider for review/approval because:  Drug not on the [facility], [facility] or [facility] refill protocol or controlled substance

## 2018-12-04 DIAGNOSIS — F90.0 ATTENTION DEFICIT HYPERACTIVITY DISORDER (ADHD), PREDOMINANTLY INATTENTIVE TYPE: ICD-10-CM

## 2018-12-05 NOTE — TELEPHONE ENCOUNTER
Requested Prescriptions   Pending Prescriptions Disp Refills     amphetamine-dextroamphetamine (ADDERALL XR) 20 MG 24 hr capsule 30 capsule 0     Sig: Take 1 capsule (20 mg) by mouth daily    There is no refill protocol information for this order     Last Written Prescription Date:  11/2/18  Last Fill Quantity: 30,  # refills: 0   Last office visit: 9/19/2018 with prescribing provider:    Future Office Visit:   Next 5 appointments (look out 90 days)     Dec 14, 2018 11:00 AM CST   Office Visit with SOLITARIO Hogan [facility] ([address] [address]   [phone]                 Routing refill request to provider for review/approval because:  Drug not on the [facility] refill protocol              amphetamine-dextroamphetamine (ADDERALL XR) 15 MG 24 hr capsule 30 capsule 0     Sig: Take 1 capsule (15 mg) by mouth daily In early afternoon    There is no refill protocol information for this order      Last Written Prescription Date:  11/2/18  Last Fill Quantity: 30,  # refills: 0   Last office visit: 9/19/2018 with prescribing provider:     Future Office Visit:   Next 5 appointments (look out 90 days)     Dec 14, 2018 11:00 AM CST   Office Visit with SOLITARIO Hogan CNP   [facility] ([address] [address]   [phone]                 Routing refill request to provider for review/approval because:  Drug not on the [facility] refill protocol     Betty Casey RN

## 2018-12-06 RX ORDER — DEXTROAMPHETAMINE SACCHARATE, AMPHETAMINE ASPARTATE MONOHYDRATE, DEXTROAMPHETAMINE SULFATE AND AMPHETAMINE SULFATE 5; 5; 5; 5 MG/1; MG/1; MG/1; MG/1
20 CAPSULE, EXTENDED RELEASE ORAL DAILY
Qty: 30 CAPSULE | Refills: 0 | OUTPATIENT
Start: 2018-12-06

## 2018-12-06 RX ORDER — DEXTROAMPHETAMINE SACCHARATE, AMPHETAMINE ASPARTATE MONOHYDRATE, DEXTROAMPHETAMINE SULFATE AND AMPHETAMINE SULFATE 3.75; 3.75; 3.75; 3.75 MG/1; MG/1; MG/1; MG/1
15 CAPSULE, EXTENDED RELEASE ORAL DAILY
Qty: 30 CAPSULE | Refills: 0 | OUTPATIENT
Start: 2018-12-06

## 2018-12-13 ENCOUNTER — OFFICE VISIT (OUTPATIENT)
Dept: FAMILY MEDICINE | Facility: CLINIC | Age: 28
End: 2018-12-13
Payer: COMMERCIAL

## 2018-12-13 VITALS
HEART RATE: 100 BPM | TEMPERATURE: 97.5 F | SYSTOLIC BLOOD PRESSURE: 110 MMHG | RESPIRATION RATE: 24 BRPM | DIASTOLIC BLOOD PRESSURE: 60 MMHG | OXYGEN SATURATION: 100 % | BODY MASS INDEX: 22.55 KG/M2 | WEIGHT: 180.4 LBS

## 2018-12-13 DIAGNOSIS — F33.1 MODERATE EPISODE OF RECURRENT MAJOR DEPRESSIVE DISORDER (H): Primary | ICD-10-CM

## 2018-12-13 DIAGNOSIS — F90.0 ATTENTION DEFICIT HYPERACTIVITY DISORDER (ADHD), PREDOMINANTLY INATTENTIVE TYPE: ICD-10-CM

## 2018-12-13 PROCEDURE — 99214 OFFICE O/P EST MOD 30 MIN: CPT | Performed by: NURSE PRACTITIONER

## 2018-12-13 RX ORDER — DEXTROAMPHETAMINE SACCHARATE, AMPHETAMINE ASPARTATE, DEXTROAMPHETAMINE SULFATE AND AMPHETAMINE SULFATE 3.75; 3.75; 3.75; 3.75 MG/1; MG/1; MG/1; MG/1
TABLET ORAL
Qty: 30 TABLET | Refills: 0 | Status: SHIPPED | OUTPATIENT
Start: 2018-12-13 | End: 2019-02-19

## 2018-12-13 RX ORDER — DEXTROAMPHETAMINE SACCHARATE, AMPHETAMINE ASPARTATE MONOHYDRATE, DEXTROAMPHETAMINE SULFATE AND AMPHETAMINE SULFATE 5; 5; 5; 5 MG/1; MG/1; MG/1; MG/1
20 CAPSULE, EXTENDED RELEASE ORAL DAILY
Qty: 30 CAPSULE | Refills: 0 | Status: SHIPPED | OUTPATIENT
Start: 2018-12-13 | End: 2019-02-19

## 2018-12-13 ASSESSMENT — PATIENT HEALTH QUESTIONNAIRE - PHQ9
SUM OF ALL RESPONSES TO PHQ QUESTIONS 1-9: 4
10. IF YOU CHECKED OFF ANY PROBLEMS, HOW DIFFICULT HAVE THESE PROBLEMS MADE IT FOR YOU TO DO YOUR WORK, TAKE CARE OF THINGS AT HOME, OR GET ALONG WITH OTHER PEOPLE: SOMEWHAT DIFFICULT
SUM OF ALL RESPONSES TO PHQ QUESTIONS 1-9: 4

## 2018-12-13 ASSESSMENT — PAIN SCALES - GENERAL: PAINLEVEL: NO PAIN (0)

## 2018-12-13 NOTE — PROGRESS NOTES
SUBJECTIVE:   Baldemar Johnson is a 28 year old male who presents to clinic today for the following health issues:      Medication Followup of Adderall    Taking Medication as prescribed: yes, ran out about 1 week ago- questioning the XR vs instant release    Side Effects:  None    Medication Helping Symptoms:  Didnt like taking the 2 XR doses       Baldemar is here for refill of his Adderall.  There was a mistake with his last prescription, he got Adderall XR 20 mg in the morning, which is his typical, and his afternoon prescription was for Adderall X are 15, he usually takes the Adderall immediate release 15 mg in the afternoon.  He did have a consultation with Greta.  He states he was told he has a situational depression, not an actual clinical depression.  He states the psychologist felt that if he could get a job, and improve his current relationship situation, he would be fine.  Baldemar states he agrees with that evaluation.  He does not feel he needs to take an antidepressant.  They have been going through a rough time with her family, and continue to work towards regaining custody of their child.  They have been advised by the county to go to marriage counseling.  The psychologist told him he thought he  should be going to individual counseling as well.  Baldemar agrees with that, and would like a referral.    Problem list and histories reviewed & adjusted, as indicated.  Additional history: as documented    BP Readings from Last 3 Encounters:   12/13/18 110/60   09/19/18 126/78   04/25/18 126/80    Wt Readings from Last 3 Encounters:   12/13/18 81.8 kg (180 lb 6.4 oz)   09/19/18 77.2 kg (170 lb 4.8 oz)   04/25/18 79.2 kg (174 lb 9.6 oz)                    Reviewed and updated as needed this visit by clinical staff       Reviewed and updated as needed this visit by Provider         ROS:  Constitutional, HEENT, cardiovascular, pulmonary, gi and gu systems are negative, except as otherwise noted.    OBJECTIVE:     BP  110/60   Pulse 100   Temp 97.5  F (36.4  C) (Temporal)   Resp 24   Wt 81.8 kg (180 lb 6.4 oz)   SpO2 100%   BMI 22.55 kg/m    Body mass index is 22.55 kg/m .   Looks a little unkempt and tired.  It is 1130 and he looks as though he just got out of bed.  Mentation is normal.  Affect is appropriate.  He maintains eye contact.  In the past he has been very talkative and positive.  He really does seem depressed today, but scores 4 on a PHQ 9        ASSESSMENT/PLAN:     Problem List Items Addressed This Visit        Medium    Moderate episode of recurrent major depressive disorder (H) - Primary    Relevant Orders    MENTAL HEALTH REFERRAL  - Adult; Outpatient Treatment; Individual/Couples/Family/Group Therapy/Health Psychology; [facility] [place] [facility] [phone]; We will contact you to schedule the appointment or please call with any questions      Other Visit Diagnoses     Attention deficit hyperactivity disorder (ADHD), predominantly inattentive type        Relevant Medications    amphetamine-dextroamphetamine (ADDERALL) 15 MG tablet    amphetamine-dextroamphetamine (ADDERALL XR) 20 MG 24 hr capsule           Adderall refilled as above  Referral was made for counseling.  He is aware he will be contacted to schedule an appointment  Appointment has been made to follow-up with myself in 1 month for recheck    SOLITARIO Hogan [facility]  Answers for HPI/ROS submitted by the patient on 12/13/2018   If you checked off any problems, how difficult have these problems made it for you to do your work, take care of things at home, or get along with other people?: Somewhat difficult  PHQ9 TOTAL SCORE: 4

## 2018-12-14 ASSESSMENT — PATIENT HEALTH QUESTIONNAIRE - PHQ9: SUM OF ALL RESPONSES TO PHQ QUESTIONS 1-9: 4

## 2019-01-17 DIAGNOSIS — F90.0 ATTENTION DEFICIT HYPERACTIVITY DISORDER (ADHD), PREDOMINANTLY INATTENTIVE TYPE: ICD-10-CM

## 2019-01-21 NOTE — TELEPHONE ENCOUNTER
Adderall 20 mg     Last Written Prescription Date:  12/13/2018  Last Fill Quantity: 30,   # refills: 0  Last Office Visit: 12/13/2018  Future Office visit:       Routing refill request to provider for review/approval because:  Drug not on the [facility], [facility] or [facility] refill protocol or controlled substance    Adderall 15 mg      Last Written Prescription Date:  12/13/2018  Last Fill Quantity: 30,   # refills: 0  Last Office Visit: 12/13/2018  Future Office visit:       Routing refill request to provider for review/approval because:  Drug not on the [facility], [facility] or [facility] refill protocol or controlled substance

## 2019-01-22 RX ORDER — DEXTROAMPHETAMINE SACCHARATE, AMPHETAMINE ASPARTATE MONOHYDRATE, DEXTROAMPHETAMINE SULFATE AND AMPHETAMINE SULFATE 5; 5; 5; 5 MG/1; MG/1; MG/1; MG/1
20 CAPSULE, EXTENDED RELEASE ORAL DAILY
Qty: 30 CAPSULE | Refills: 0 | OUTPATIENT
Start: 2019-01-22

## 2019-01-22 RX ORDER — DEXTROAMPHETAMINE SACCHARATE, AMPHETAMINE ASPARTATE, DEXTROAMPHETAMINE SULFATE AND AMPHETAMINE SULFATE 3.75; 3.75; 3.75; 3.75 MG/1; MG/1; MG/1; MG/1
TABLET ORAL
Qty: 30 TABLET | Refills: 0 | OUTPATIENT
Start: 2019-01-22

## 2019-01-22 NOTE — TELEPHONE ENCOUNTER
The patient has no showed his last 2 appointments that he has made.  No refills on this medication unless he is compliant with the recommendation for counseling and for his clinic follow-ups

## 2019-02-19 ENCOUNTER — OFFICE VISIT (OUTPATIENT)
Dept: FAMILY MEDICINE | Facility: CLINIC | Age: 29
End: 2019-02-19
Payer: COMMERCIAL

## 2019-02-19 DIAGNOSIS — F33.1 MODERATE EPISODE OF RECURRENT MAJOR DEPRESSIVE DISORDER (H): ICD-10-CM

## 2019-02-19 DIAGNOSIS — F17.200 TOBACCO USE DISORDER: ICD-10-CM

## 2019-02-19 DIAGNOSIS — Z63.9 RELATIONSHIP PROBLEMS: ICD-10-CM

## 2019-02-19 DIAGNOSIS — F90.0 ATTENTION DEFICIT HYPERACTIVITY DISORDER (ADHD), PREDOMINANTLY INATTENTIVE TYPE: Primary | ICD-10-CM

## 2019-02-19 PROCEDURE — 99214 OFFICE O/P EST MOD 30 MIN: CPT | Performed by: NURSE PRACTITIONER

## 2019-02-19 RX ORDER — DEXTROAMPHETAMINE SACCHARATE, AMPHETAMINE ASPARTATE MONOHYDRATE, DEXTROAMPHETAMINE SULFATE AND AMPHETAMINE SULFATE 5; 5; 5; 5 MG/1; MG/1; MG/1; MG/1
20 CAPSULE, EXTENDED RELEASE ORAL DAILY
Qty: 30 CAPSULE | Refills: 0 | Status: SHIPPED | OUTPATIENT
Start: 2019-02-19 | End: 2019-03-15

## 2019-02-19 RX ORDER — DEXTROAMPHETAMINE SACCHARATE, AMPHETAMINE ASPARTATE, DEXTROAMPHETAMINE SULFATE AND AMPHETAMINE SULFATE 3.75; 3.75; 3.75; 3.75 MG/1; MG/1; MG/1; MG/1
TABLET ORAL
Qty: 30 TABLET | Refills: 0 | Status: SHIPPED | OUTPATIENT
Start: 2019-02-19 | End: 2019-03-15

## 2019-02-19 SDOH — SOCIAL STABILITY - SOCIAL INSECURITY: PROBLEM RELATED TO PRIMARY SUPPORT GROUP, UNSPECIFIED: Z63.9

## 2019-02-19 ASSESSMENT — MIFFLIN-ST. JEOR: SCORE: 1930.4
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
## 2019-03-15 ENCOUNTER — OFFICE VISIT (OUTPATIENT)
Dept: FAMILY MEDICINE | Facility: CLINIC | Age: 29
End: 2019-03-15
Payer: COMMERCIAL

## 2019-03-15 VITALS
RESPIRATION RATE: 22 BRPM | TEMPERATURE: 98 F | WEIGHT: 182 LBS | HEIGHT: 75 IN | SYSTOLIC BLOOD PRESSURE: 116 MMHG | OXYGEN SATURATION: 98 % | HEART RATE: 110 BPM | BODY MASS INDEX: 22.63 KG/M2 | DIASTOLIC BLOOD PRESSURE: 66 MMHG

## 2019-03-15 DIAGNOSIS — F90.0 ATTENTION DEFICIT HYPERACTIVITY DISORDER (ADHD), PREDOMINANTLY INATTENTIVE TYPE: ICD-10-CM

## 2019-03-15 PROCEDURE — 99213 OFFICE O/P EST LOW 20 MIN: CPT | Performed by: NURSE PRACTITIONER

## 2019-03-15 RX ORDER — DEXTROAMPHETAMINE SACCHARATE, AMPHETAMINE ASPARTATE MONOHYDRATE, DEXTROAMPHETAMINE SULFATE AND AMPHETAMINE SULFATE 5; 5; 5; 5 MG/1; MG/1; MG/1; MG/1
20 CAPSULE, EXTENDED RELEASE ORAL DAILY
Qty: 30 CAPSULE | Refills: 0 | Status: SHIPPED | OUTPATIENT
Start: 2019-03-21 | End: 2019-04-19

## 2019-03-15 RX ORDER — DEXTROAMPHETAMINE SACCHARATE, AMPHETAMINE ASPARTATE, DEXTROAMPHETAMINE SULFATE AND AMPHETAMINE SULFATE 3.75; 3.75; 3.75; 3.75 MG/1; MG/1; MG/1; MG/1
TABLET ORAL
Qty: 30 TABLET | Refills: 0 | Status: SHIPPED | OUTPATIENT
Start: 2019-03-21 | End: 2019-04-19

## 2019-03-15 ASSESSMENT — MIFFLIN-ST. JEOR: SCORE: 1886.18

## 2019-03-15 NOTE — PROGRESS NOTES
"  SUBJECTIVE:   Baldemar Johnson is a 29 year old male who presents to clinic today for the following health issues:      Medication Followup of Adderall    Taking Medication as prescribed: yes    Side Effects:  None    Medication Helping Symptoms:  yes       Patient is here to follow-up management of ADD.  He is presently taking Adderall XR 20 mg in the morning and Adderall immediate release 15 mg in the afternoon.  He states this seems to be helpful.  He recently had an interview for a warehouse job, is very hopeful that this will pan out for him.  He states he saw his wife and his child today for the first time in 2 months.  He is feeling a little overwhelmed by all of it, not sure how he feels about the relationship, not sure of how things are going to be going forward.  He does not really want to address an issue of depression, he feels that he is going to be okay once he gets a job and can get back on his feet.  At our last visit he requested a prescription for Chantix to quit smoking.  He does have the prescription, but states he has not yet started it.    Problem list and histories reviewed & adjusted, as indicated.  Additional history: as documented    BP Readings from Last 3 Encounters:   03/15/19 116/66   02/19/19 (P) 130/82   12/13/18 110/60    Wt Readings from Last 3 Encounters:   03/15/19 82.6 kg (182 lb)   02/19/19 (P) 87.8 kg (193 lb 8 oz)   12/13/18 81.8 kg (180 lb 6.4 oz)                    Reviewed and updated as needed this visit by clinical staff       Reviewed and updated as needed this visit by Provider         ROS:  Constitutional, HEENT, cardiovascular, pulmonary, gi and gu systems are negative, except as otherwise noted.    OBJECTIVE:     /66   Pulse 110   Temp 98  F (36.7  C) (Temporal)   Resp 22   Ht 1.905 m (6' 3\")   Wt 82.6 kg (182 lb)   SpO2 98%   BMI 22.75 kg/m    Body mass index is 22.75 kg/m .   GENERAL: Rather unkempt appearing today.  EYES: Eyes grossly normal to " inspection, PERRL and conjunctivae and sclerae normal  RESP: lungs clear to auscultation - no rales, rhonchi or wheezes  CV: regular rates and rhythm, normal S1 S2, no S3 or S4 and no murmur, click or rub  PSYCH: Mentation is normal.  Mood seems a little depressed, affect quiet.  Not quite as conversational as at our last visit.        ASSESSMENT/PLAN:     Problem List Items Addressed This Visit     None      Visit Diagnoses     Attention deficit hyperactivity disorder (ADHD), predominantly inattentive type        Relevant Medications    amphetamine-dextroamphetamine (ADDERALL XR) 20 MG 24 hr capsule (Start on 3/21/2019)    amphetamine-dextroamphetamine (ADDERALL) 15 MG tablet (Start on 3/21/2019)           Prescriptions refilled for his Adderall to be brought to the pharmacy.  They can be picked up on 3/21.  Encouraged to continue trying smoking cessation.  Follow-up in clinic in 1 month.  I think we should further pursue  depression management for him if things are not looking up.    SOLITARIO Hogan [facility]

## 2019-04-19 ENCOUNTER — OFFICE VISIT (OUTPATIENT)
Dept: FAMILY MEDICINE | Facility: CLINIC | Age: 29
End: 2019-04-19
Payer: COMMERCIAL

## 2019-04-19 VITALS
BODY MASS INDEX: 22.87 KG/M2 | SYSTOLIC BLOOD PRESSURE: 120 MMHG | HEART RATE: 100 BPM | TEMPERATURE: 97.4 F | RESPIRATION RATE: 20 BRPM | DIASTOLIC BLOOD PRESSURE: 60 MMHG | WEIGHT: 183 LBS | OXYGEN SATURATION: 100 %

## 2019-04-19 DIAGNOSIS — F90.0 ATTENTION DEFICIT HYPERACTIVITY DISORDER (ADHD), PREDOMINANTLY INATTENTIVE TYPE: ICD-10-CM

## 2019-04-19 DIAGNOSIS — F33.1 MODERATE EPISODE OF RECURRENT MAJOR DEPRESSIVE DISORDER (H): Primary | ICD-10-CM

## 2019-04-19 PROCEDURE — 99000 SPECIMEN HANDLING OFFICE-LAB: CPT | Performed by: NURSE PRACTITIONER

## 2019-04-19 PROCEDURE — 80307 DRUG TEST PRSMV CHEM ANLYZR: CPT | Mod: 90 | Performed by: NURSE PRACTITIONER

## 2019-04-19 PROCEDURE — 99213 OFFICE O/P EST LOW 20 MIN: CPT | Performed by: NURSE PRACTITIONER

## 2019-04-19 RX ORDER — DEXTROAMPHETAMINE SACCHARATE, AMPHETAMINE ASPARTATE MONOHYDRATE, DEXTROAMPHETAMINE SULFATE AND AMPHETAMINE SULFATE 5; 5; 5; 5 MG/1; MG/1; MG/1; MG/1
20 CAPSULE, EXTENDED RELEASE ORAL DAILY
Qty: 30 CAPSULE | Refills: 0 | Status: SHIPPED | OUTPATIENT
Start: 2019-04-19 | End: 2019-05-07

## 2019-04-19 RX ORDER — SERTRALINE HYDROCHLORIDE 25 MG/1
25 TABLET, FILM COATED ORAL DAILY
Qty: 30 TABLET | Refills: 0 | Status: SHIPPED | OUTPATIENT
Start: 2019-04-19 | End: 2019-05-04

## 2019-04-19 RX ORDER — DEXTROAMPHETAMINE SACCHARATE, AMPHETAMINE ASPARTATE, DEXTROAMPHETAMINE SULFATE AND AMPHETAMINE SULFATE 3.75; 3.75; 3.75; 3.75 MG/1; MG/1; MG/1; MG/1
TABLET ORAL
Qty: 30 TABLET | Refills: 0 | Status: SHIPPED | OUTPATIENT
Start: 2019-04-19 | End: 2019-05-07

## 2019-04-19 ASSESSMENT — ANXIETY QUESTIONNAIRES
2. NOT BEING ABLE TO STOP OR CONTROL WORRYING: NOT AT ALL
5. BEING SO RESTLESS THAT IT IS HARD TO SIT STILL: SEVERAL DAYS
6. BECOMING EASILY ANNOYED OR IRRITABLE: NEARLY EVERY DAY
3. WORRYING TOO MUCH ABOUT DIFFERENT THINGS: NOT AT ALL
GAD7 TOTAL SCORE: 9
IF YOU CHECKED OFF ANY PROBLEMS ON THIS QUESTIONNAIRE, HOW DIFFICULT HAVE THESE PROBLEMS MADE IT FOR YOU TO DO YOUR WORK, TAKE CARE OF THINGS AT HOME, OR GET ALONG WITH OTHER PEOPLE: VERY DIFFICULT
7. FEELING AFRAID AS IF SOMETHING AWFUL MIGHT HAPPEN: SEVERAL DAYS
1. FEELING NERVOUS, ANXIOUS, OR ON EDGE: MORE THAN HALF THE DAYS

## 2019-04-19 ASSESSMENT — PAIN SCALES - GENERAL: PAINLEVEL: NO PAIN (0)

## 2019-04-19 ASSESSMENT — PATIENT HEALTH QUESTIONNAIRE - PHQ9
SUM OF ALL RESPONSES TO PHQ QUESTIONS 1-9: 8
5. POOR APPETITE OR OVEREATING: MORE THAN HALF THE DAYS

## 2019-04-19 NOTE — PROGRESS NOTES
SUBJECTIVE:   Baldemar Johnson is a 29 year old male who presents to clinic today for the following   health issues:        Medication Followup of Adderall    Taking Medication as prescribed: yes    Side Effects:  None    Medication Helping Symptoms:  yes       Patient states not taking Chantix at this time due to family issues- per counselor    Baldemar is here for a refill prescription of his Adderall .  He is taking Adderall XR 20 mg in the morning and Adderall immediate release 15 mg in the afternoon.  He states this seems to work well without adverse side effect.  He has had no luck finding a job.  He states he has sent in many applications but has not been contacted for an interview.  He is very anxious presently regarding the upcoming court date regarding custody of his son.  He and his wife are currently scheduled for individual counseling and marriage counseling.       Additional history: as documented    Reviewed  and updated as needed this visit by clinical staff         Reviewed and updated as needed this visit by Provider         BP Readings from Last 3 Encounters:   04/19/19 120/60   03/15/19 116/66   02/19/19 (P) 130/82    Wt Readings from Last 3 Encounters:   04/19/19 83 kg (183 lb)   03/15/19 82.6 kg (182 lb)   02/19/19 (P) 87.8 kg (193 lb 8 oz)                    ROS:  Constitutional, HEENT, cardiovascular, pulmonary, gi and gu systems are negative, except as otherwise noted.    OBJECTIVE:     /60   Pulse 100   Temp 97.4  F (36.3  C) (Temporal)   Resp 20   Wt 83 kg (183 lb)   SpO2 100%   BMI 22.87 kg/m    Body mass index is 22.87 kg/m .   General appearance: He appears a little more settled today.  His hygiene is good.  He is pleasant, offers some information.  Mentation normal.  Mood and affect normal.  Admits to significant anxiety regarding the upcoming court appearance regarding child custody.  He scores 8 on a PHQ 9, having greatest difficulty with concentrating and trouble sleeping.  He  denies any thoughts of health self-harm.  He scores 9 on a KALANI 7.  Admitting to irritability, anxiety and trouble relaxing.        ASSESSMENT/PLAN:     Problem List Items Addressed This Visit        Medium    Moderate episode of recurrent major depressive disorder (H) - Primary    Relevant Medications    sertraline (ZOLOFT) 25 MG tablet      Other Visit Diagnoses     Attention deficit hyperactivity disorder (ADHD), predominantly inattentive type        Relevant Medications    amphetamine-dextroamphetamine (ADDERALL XR) 20 MG 24 hr capsule    amphetamine-dextroamphetamine (ADDERALL) 15 MG tablet    Other Relevant Orders    Drug  Screen Comprehensive , Urine with Reported Meds ([facility]) (Pain Care Package) (Completed)           Adderall prescriptions were refilled  We discussed the use of SSRIs for management of depression and anxiety.  Start Zoloft 25 mg once daily.  Will recheck in clinic in 1 month when he comes back for ADHD management    SOLITARIO Hogan [facility]

## 2019-04-20 ASSESSMENT — ANXIETY QUESTIONNAIRES: GAD7 TOTAL SCORE: 9

## 2019-04-26 LAB — PAIN DRUG SCR UR W RPTD MEDS: NORMAL

## 2019-05-05 DIAGNOSIS — F90.0 ATTENTION DEFICIT HYPERACTIVITY DISORDER (ADHD), PREDOMINANTLY INATTENTIVE TYPE: ICD-10-CM

## 2019-05-07 ENCOUNTER — HOSPITAL ENCOUNTER (EMERGENCY)
Facility: CLINIC | Age: 29
Discharge: HOME | End: 2019-05-07
Attending: EMERGENCY MEDICINE | Admitting: EMERGENCY MEDICINE
Payer: COMMERCIAL

## 2019-05-07 ENCOUNTER — APPOINTMENT (OUTPATIENT)
Dept: GENERAL RADIOLOGY | Facility: CLINIC | Age: 29
End: 2019-05-07
Attending: EMERGENCY MEDICINE
Payer: COMMERCIAL

## 2019-05-07 VITALS
OXYGEN SATURATION: 100 % | RESPIRATION RATE: 14 BRPM | TEMPERATURE: 97 F | WEIGHT: 185 LBS | BODY MASS INDEX: 23.12 KG/M2 | DIASTOLIC BLOOD PRESSURE: 78 MMHG | SYSTOLIC BLOOD PRESSURE: 128 MMHG

## 2019-05-07 DIAGNOSIS — S29.011A PECTORALIS MUSCLE STRAIN, INITIAL ENCOUNTER: ICD-10-CM

## 2019-05-07 PROCEDURE — 99283 EMERGENCY DEPT VISIT LOW MDM: CPT | Mod: 25 | Performed by: EMERGENCY MEDICINE

## 2019-05-07 PROCEDURE — 71046 X-RAY EXAM CHEST 2 VIEWS: CPT | Mod: TC

## 2019-05-07 PROCEDURE — 99283 EMERGENCY DEPT VISIT LOW MDM: CPT | Mod: Z6 | Performed by: EMERGENCY MEDICINE

## 2019-05-07 RX ORDER — DEXTROAMPHETAMINE SACCHARATE, AMPHETAMINE ASPARTATE, DEXTROAMPHETAMINE SULFATE AND AMPHETAMINE SULFATE 3.75; 3.75; 3.75; 3.75 MG/1; MG/1; MG/1; MG/1
TABLET ORAL
Qty: 30 TABLET | Refills: 0 | Status: SHIPPED | OUTPATIENT
Start: 2019-05-07 | End: 2019-05-15

## 2019-05-07 RX ORDER — DEXTROAMPHETAMINE SACCHARATE, AMPHETAMINE ASPARTATE MONOHYDRATE, DEXTROAMPHETAMINE SULFATE AND AMPHETAMINE SULFATE 5; 5; 5; 5 MG/1; MG/1; MG/1; MG/1
20 CAPSULE, EXTENDED RELEASE ORAL DAILY
Qty: 30 CAPSULE | Refills: 0 | Status: SHIPPED | OUTPATIENT
Start: 2019-05-07 | End: 2019-05-15

## 2019-05-07 NOTE — DISCHARGE INSTRUCTIONS
Your chest x-ray was normal.  I think your pain is due to a muscle problem with the pectoralis muscle.  This should get better with time, ibuprofen and rest.

## 2019-05-07 NOTE — ED PROVIDER NOTES
History     Chief Complaint   Patient presents with     Shoulder Pain     HPI  Baldemar Johnson is a 29 year old male who presents to the ED with 4-5 days of right shoulder and chest discomfort. It is made worse by using his muscles like sit ups, putting deodarant on. He has a sharp pain in his right chest when he uses the muscles. Does not feel short of breath just sitting there.  This came on in the night. Currently not working. No recent injury or overuse. No abnormal sweating, nausea, fever, cough, abdominal pain.  He has a smokers cough.  He has not taken any ibuprofen or tylenol. He was concerned for a broken rib or something like that.  He came to the ED to make sure he was ok.     Allergies:  No Known Allergies    Problem List:    Patient Active Problem List    Diagnosis Date Noted     Controlled substance agreement signed 09/19/2018     Priority: Medium     Acne vulgaris 09/08/2016     Priority: Medium     Moderate episode of recurrent major depressive disorder (H) 07/12/2016     Priority: Medium     Attention deficit hyperactivity disorder (ADHD), combined type 07/12/2016     Priority: Medium     Tobacco use disorder 07/12/2016     Priority: Medium        Past Medical History:    Past Medical History:   Diagnosis Date     ADHD      Depressive disorder        Past Surgical History:    History reviewed. No pertinent surgical history.    Family History:    Family History   Problem Relation Age of Onset     Mental Illness Father        Social History:  Marital Status:   [2]  Social History     Tobacco Use     Smoking status: Current Every Day Smoker     Packs/day: 0.50     Start date: 7/12/2006     Smokeless tobacco: Never Used   Substance Use Topics     Alcohol use: Yes     Comment: occ     Drug use: No        Medications:      amphetamine-dextroamphetamine (ADDERALL XR) 20 MG 24 hr capsule   amphetamine-dextroamphetamine (ADDERALL) 15 MG tablet   IBUPROFEN 200 MG PO CAPS   sertraline (ZOLOFT) 25 MG  tablet   TYLENOL 325 MG PO TABS         Review of Systems   All other systems reviewed and are negative.      Physical Exam   BP: 128/78  Heart Rate: 82  Temp: 97  F (36.1  C)  Resp: 14  Weight: 83.9 kg (185 lb)  SpO2: 100 %      Physical Exam   Constitutional: He is oriented to person, place, and time. He appears well-developed and well-nourished. No distress.   HENT:   Head: Normocephalic and atraumatic.   Eyes: No scleral icterus.   Neck: Normal range of motion. Neck supple.   Cardiovascular: Normal rate and regular rhythm.   Pulmonary/Chest: Effort normal and breath sounds normal. He exhibits tenderness.   Tenderness to palpation over the anterior chest wall this is made worse by reaching across his chest and by trying to sit up engaging the muscles.   Musculoskeletal:   Chest wall discomfort with engagement of the pectoralis muscles.   Neurological: He is alert and oriented to person, place, and time.   Skin: Skin is warm and dry. Capillary refill takes less than 2 seconds. No rash noted. He is not diaphoretic.   Psychiatric: He has a normal mood and affect. His behavior is normal.   Nursing note and vitals reviewed.      ED Course        Procedures                 Results for orders placed or performed during the hospital encounter of 05/07/19 (from the past 24 hour(s))   XR Chest 2 Views    Narrative    XR CHEST 2 VW 5/7/2019 11:13 AM     HISTORY: Shortness of breath, right sided chest pain    COMPARISON: None      Impression    IMPRESSION: The cardiac silhouette and pulmonary vasculature are  within normal limits. No evidence of pneumothorax or pleural effusion.  The lungs are clear.    EMMANUELLE PATRICIA MD       Medications - No data to display    Assessments & Plan (with Medical Decision Making)  Pectoralis muscle strain  Ibuprofen, rest, return to ER precautions given     I have reviewed the nursing notes.    I have reviewed the findings, diagnosis, plan and need for follow up with the patient.          Medication List      There are no discharge medications for this visit.         Final diagnoses:   Pectoralis muscle strain, initial encounter       5/7/2019   [facility] EMERGENCY DEPARTMENT     Luis Manuel Palacios MD  05/07/19 1125

## 2019-05-07 NOTE — ED TRIAGE NOTES
Presents with right shoulder -neck pain. States it started four days ago and he thought it was a pulled muscle. It has not affected his range of motion but states he is unable to push himself up with just his right arm.

## 2019-05-07 NOTE — ED AVS SNAPSHOT
[facility] Emergency Department  [address] [place] [address]  Phone:  [phone]  Fax:  [phone]                                    Baldemar Johnson   MRN: [id number]    Department:  [facility] Emergency Department   Date of Visit:  5/7/2019           After Visit Summary Signature Page    I have received my discharge instructions, and my questions have been answered. I have discussed any challenges I see with this plan with the nurse or doctor.    ..........................................................................................................................................  Patient/Patient Representative Signature      ..........................................................................................................................................  Patient Representative Print Name and Relationship to Patient    ..................................................               ................................................  Date                                   Time    ..........................................................................................................................................  Reviewed by Signature/Title    ...................................................              ..............................................  Date                                               Time          22EPIC Rev 08/18

## 2019-05-07 NOTE — TELEPHONE ENCOUNTER
Controlled Substance Refill Request for Adderall  Problem List Complete:    No     PROVIDER TO CONSIDER COMPLETION OF PROBLEM LIST AND OVERVIEW/CONTROLLED SUBSTANCE AGREEMENT    Last Written Prescription Date:  4/19/2019  Last Fill Quantity: 30,   # refills: 0    THE MOST RECENT OFFICE VISIT MUST BE WITHIN THE PAST 3 MONTHS. AT LEAST ONE FACE TO FACE VISIT MUST OCCUR EVERY 6 MONTHS. ADDITIONAL VISITS CAN BE VIRTUAL.  (THIS STATEMENT SHOULD BE DELETED.)    [place] [facility] [place] [facility] [place]: 4/19/2019    Future Office visit:   Next 5 appointments (look out 90 days)    May 08, 2019  3:00 PM CDT  [facility] with SOLITARIO Hogan CNP  [facility] [address] [place] [id number]  [phone]   May 17, 2019  1:00 PM CDT  MyChart Long with SOLITARIO Hogan CNP  [facility] [address] [place] [address]  [phone]          Controlled substance agreement:   Encounter-Level CSA - 09/19/2018:    Controlled Substance Agreement - Scan on 9/20/2018  7:25 AM: CONTROLLED SUBSTANCE AGREEMENT (below)       Patient-Level CSA:    There are no patient-level csa.         Last Urine Drug Screen:   Pain Drug SCR UR W RPTD Meds   Date Value Ref Range Status   04/19/2019 FINAL  Final     Comment:     (Note)  ====================================================================  [facility] COMP DRUG ANALYSIS,UR  ====================================================================  Test                             Result       Flag       Units        Drug Present and Declared for Prescription Verification   Amphetamine                    283          EXPECTED   ng/mg creat    Amphetamine is available as a schedule II prescription drug.  ====================================================================  Test                      Result    Flag   Units      Ref Range        Creatinine              224               mg/dL      >=20            ====================================================================  Declared Medications:  The flagging and interpretation on this report are based on the  following declared medications.  Unexpected results may arise from  inaccuracies in the declared medications.  **Note: The testing scope of this panel includes these medications:  Amphetamine (Adderall)  ====================================================================  For clinical consultation, please call [phone].  ====================================================================  Analysis performed by [employer] [place] [address]     , No results found for: COMDAT, No results found for: THC13, PCP13, COC13, MAMP13, OPI13, AMP13, BZO13, TCA13, MTD13, BAR13, OXY13, PPX13, BUP13     [email or web address]       checked in past 3 months?  Yes 5/7/2019  No issues.

## 2019-05-14 ENCOUNTER — MYC MEDICAL ADVICE (OUTPATIENT)
Dept: FAMILY MEDICINE | Facility: CLINIC | Age: 29
End: 2019-05-14

## 2019-05-14 DIAGNOSIS — F90.0 ATTENTION DEFICIT HYPERACTIVITY DISORDER (ADHD), PREDOMINANTLY INATTENTIVE TYPE: ICD-10-CM

## 2019-05-15 RX ORDER — DEXTROAMPHETAMINE SACCHARATE, AMPHETAMINE ASPARTATE, DEXTROAMPHETAMINE SULFATE AND AMPHETAMINE SULFATE 3.75; 3.75; 3.75; 3.75 MG/1; MG/1; MG/1; MG/1
TABLET ORAL
Qty: 30 TABLET | Refills: 0 | Status: SHIPPED | OUTPATIENT
Start: 2019-05-15 | End: 2019-06-18

## 2019-05-15 RX ORDER — DEXTROAMPHETAMINE SACCHARATE, AMPHETAMINE ASPARTATE MONOHYDRATE, DEXTROAMPHETAMINE SULFATE AND AMPHETAMINE SULFATE 5; 5; 5; 5 MG/1; MG/1; MG/1; MG/1
20 CAPSULE, EXTENDED RELEASE ORAL DAILY
Qty: 30 CAPSULE | Refills: 0 | Status: SHIPPED | OUTPATIENT
Start: 2019-05-15 | End: 2019-06-18
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
## 2019-06-10 ENCOUNTER — TELEPHONE (OUTPATIENT)
Dept: FAMILY MEDICINE | Facility: CLINIC | Age: 29
End: 2019-06-10

## 2019-06-10 NOTE — TELEPHONE ENCOUNTER
Patient is due for a PHQ-9.  Index start date:2/24/2019  Index end date:6/24/2019    Please call patient. Pt is active on [employer]. I have sent a PHQ-9 via [employer] and will postpone the encounter. Amelia Medeiros CMA ([facility])

## 2019-06-12 ENCOUNTER — MYC REFILL (OUTPATIENT)
Dept: FAMILY MEDICINE | Facility: CLINIC | Age: 29
End: 2019-06-12

## 2019-06-12 DIAGNOSIS — F33.1 MODERATE EPISODE OF RECURRENT MAJOR DEPRESSIVE DISORDER (H): ICD-10-CM

## 2019-06-12 NOTE — TELEPHONE ENCOUNTER
Pt completed PHQ-9.     PHQ-9 SCORE 6/12/2019   PHQ-9 Total Score MyChart 7 (Mild depression)   PHQ-9 Total Score 7     Amelia Medeiros CMA ([facility])

## 2019-06-13 RX ORDER — SERTRALINE HYDROCHLORIDE 25 MG/1
25 TABLET, FILM COATED ORAL DAILY
Qty: 30 TABLET | Refills: 3 | OUTPATIENT
Start: 2019-06-13

## 2019-06-13 NOTE — TELEPHONE ENCOUNTER
"Requested Prescriptions   Pending Prescriptions Disp Refills     sertraline (ZOLOFT) 25 MG tablet 30 tablet 3     Sig: Take 1 tablet (25 mg) by mouth daily   Last Written Prescription Date:  5/7/2019  Last Fill Quantity: 30,  # refills: 3   Last office visit: 4/19/2019 with prescribing provider:     Future Office Visit:   Next 5 appointments (look out 90 days)    Jun 19, 2019  2:00 PM CDT  Maricarmen Jackson with Tomy Becerra MD  [facility] ([address] [address]  [phone]             SSRIs Protocol Failed - 6/12/2019  3:52 PM        Failed - PHQ-9 score less than 5 in past 6 months     Please review last PHQ-9 score.           Passed - Medication is active on med list        Passed - Patient is age 18 or older        Passed - Recent (6 mo) or future (30 days) visit within the authorizing provider's specialty     Patient had office visit in the last 6 months or has a visit in the next 30 days with authorizing provider or within the authorizing provider's specialty.  See \"Patient Info\" tab in inbasket, or \"Choose Columns\" in Meds & Orders section of the refill encounter.          Denied  Remaining refills current  Betty Casey RN    "

## 2019-06-18 ENCOUNTER — MYC MEDICAL ADVICE (OUTPATIENT)
Dept: FAMILY MEDICINE | Facility: CLINIC | Age: 29
End: 2019-06-18

## 2019-06-18 ENCOUNTER — MYC REFILL (OUTPATIENT)
Dept: FAMILY MEDICINE | Facility: CLINIC | Age: 29
End: 2019-06-18

## 2019-06-18 DIAGNOSIS — F90.0 ATTENTION DEFICIT HYPERACTIVITY DISORDER (ADHD), PREDOMINANTLY INATTENTIVE TYPE: ICD-10-CM

## 2019-06-19 ENCOUNTER — OFFICE VISIT (OUTPATIENT)
Dept: FAMILY MEDICINE | Facility: CLINIC | Age: 29
End: 2019-06-19
Payer: COMMERCIAL

## 2019-06-19 VITALS
TEMPERATURE: 97.2 F | HEART RATE: 92 BPM | SYSTOLIC BLOOD PRESSURE: 122 MMHG | WEIGHT: 181 LBS | OXYGEN SATURATION: 98 % | RESPIRATION RATE: 14 BRPM | BODY MASS INDEX: 22.62 KG/M2 | DIASTOLIC BLOOD PRESSURE: 58 MMHG

## 2019-06-19 DIAGNOSIS — R20.2 NUMBNESS OR TINGLING: ICD-10-CM

## 2019-06-19 DIAGNOSIS — F90.2 ATTENTION DEFICIT HYPERACTIVITY DISORDER (ADHD), COMBINED TYPE: Primary | ICD-10-CM

## 2019-06-19 DIAGNOSIS — R20.0 NUMBNESS OR TINGLING: ICD-10-CM

## 2019-06-19 PROCEDURE — 99214 OFFICE O/P EST MOD 30 MIN: CPT | Performed by: FAMILY MEDICINE

## 2019-06-19 RX ORDER — DEXTROAMPHETAMINE SACCHARATE, AMPHETAMINE ASPARTATE, DEXTROAMPHETAMINE SULFATE AND AMPHETAMINE SULFATE 3.75; 3.75; 3.75; 3.75 MG/1; MG/1; MG/1; MG/1
TABLET ORAL
Qty: 30 TABLET | Refills: 0 | Status: SHIPPED | OUTPATIENT
Start: 2019-06-19 | End: 2019-07-05

## 2019-06-19 RX ORDER — METHYLPREDNISOLONE 4 MG
TABLET, DOSE PACK ORAL
Qty: 21 TABLET | Refills: 0 | Status: SHIPPED | OUTPATIENT
Start: 2019-06-19 | End: 2019-12-02

## 2019-06-19 RX ORDER — DEXTROAMPHETAMINE SACCHARATE, AMPHETAMINE ASPARTATE MONOHYDRATE, DEXTROAMPHETAMINE SULFATE AND AMPHETAMINE SULFATE 5; 5; 5; 5 MG/1; MG/1; MG/1; MG/1
20 CAPSULE, EXTENDED RELEASE ORAL DAILY
Qty: 30 CAPSULE | Refills: 0 | Status: SHIPPED | OUTPATIENT
Start: 2019-06-19 | End: 2019-07-05

## 2019-06-19 NOTE — PROGRESS NOTES
Subjective     Baldemar Johnson is a 29 year old male who presents to clinic today for the following health issues:    HPI   Medication Followup of Zoloft and Adderall     Taking Medication as prescribed: yes Adderall, Zoloft no    Side Effects:  None with Adderall states that Zoloft made feel emotional stopped taking after 3 weeks of being on medication      Medication Helping Symptoms:  yes     SUBJECTIVE:  Baldemar  is a 29 year old male who presents for: Follow-up of his medications.  He has been diagnosed with ADHD.  He was started on Zoloft earlier in the spring because of situational depression he is lost his job is lost custody of this child.  But the Zoloft was not making him feel well at all.  Discontinue that.  Continues on his ADHD medication.  Also states that he is got some tingling in his hand.   No neck pain.  No known injury.    Past Medical History:   Diagnosis Date     ADHD      Depressive disorder      History reviewed. No pertinent surgical history.  Social History     Tobacco Use     Smoking status: Current Every Day Smoker     Packs/day: 0.50     Start date: 7/12/2006     Smokeless tobacco: Never Used   Substance Use Topics     Alcohol use: Yes     Comment: occ     Current Outpatient Medications   Medication Sig Dispense Refill     amphetamine-dextroamphetamine (ADDERALL XR) 20 MG 24 hr capsule Take 1 capsule (20 mg) by mouth daily 30 capsule 0     amphetamine-dextroamphetamine (ADDERALL) 15 MG tablet Take 1 capsule by mouth daily in the early afternoon 30 tablet 0     IBUPROFEN 200 MG PO CAPS 1-3 CAPS EVERY 6 HOURS AS NEEDED for pain/fever       methylPREDNISolone (MEDROL DOSEPAK) 4 MG tablet therapy pack Follow Package Directions 21 tablet 0     TYLENOL 325 MG PO TABS 2 TABLETS EVERY 4 HOURS AS NEEDED for pain/fever         REVIEW OF SYSTEMS:   5 point ROS negative except as noted above in HPI, including Gen., Resp, CV, GI &  system review.     OBJECTIVE:  Vitals: /58   Pulse 92   Temp  97.2  F (36.2  C) (Temporal)   Resp 14   Wt 82.1 kg (181 lb)   SpO2 98%   BMI 22.62 kg/m    BMI= Body mass index is 22.62 kg/m .  He is alert appears in no distress.  Somewhat flat affect.  His heart with a regular rhythm rate 80s.  Lungs are clear.  Extremities are normal.  Normal  strength.  No Phalen's sign.    ASSESSMENT:  #1 ADHD #2 tingling in hand    PLAN:  We are just going to try him on a Medrol Dosepak to see if this is some nerve compression.  His Adderall has been refilled.  We will follow-up in a month to see how he is doing.        Tomy Becerra MD  [facility]

## 2019-07-05 ENCOUNTER — MYC REFILL (OUTPATIENT)
Dept: FAMILY MEDICINE | Facility: CLINIC | Age: 29
End: 2019-07-05

## 2019-07-05 DIAGNOSIS — F90.0 ATTENTION DEFICIT HYPERACTIVITY DISORDER (ADHD), PREDOMINANTLY INATTENTIVE TYPE: ICD-10-CM

## 2019-07-08 RX ORDER — DEXTROAMPHETAMINE SACCHARATE, AMPHETAMINE ASPARTATE MONOHYDRATE, DEXTROAMPHETAMINE SULFATE AND AMPHETAMINE SULFATE 5; 5; 5; 5 MG/1; MG/1; MG/1; MG/1
20 CAPSULE, EXTENDED RELEASE ORAL DAILY
Qty: 30 CAPSULE | Refills: 0 | Status: SHIPPED | OUTPATIENT
Start: 2019-07-08 | End: 2019-08-04

## 2019-07-08 RX ORDER — DEXTROAMPHETAMINE SACCHARATE, AMPHETAMINE ASPARTATE, DEXTROAMPHETAMINE SULFATE AND AMPHETAMINE SULFATE 3.75; 3.75; 3.75; 3.75 MG/1; MG/1; MG/1; MG/1
TABLET ORAL
Qty: 30 TABLET | Refills: 0 | Status: SHIPPED | OUTPATIENT
Start: 2019-07-08 | End: 2019-08-04

## 2019-07-15 ENCOUNTER — OFFICE VISIT (OUTPATIENT)
Dept: FAMILY MEDICINE | Facility: CLINIC | Age: 29
End: 2019-07-15
Payer: COMMERCIAL

## 2019-07-15 VITALS
OXYGEN SATURATION: 98 % | HEIGHT: 75 IN | TEMPERATURE: 97.6 F | BODY MASS INDEX: 23.45 KG/M2 | WEIGHT: 188.6 LBS | DIASTOLIC BLOOD PRESSURE: 70 MMHG | HEART RATE: 96 BPM | SYSTOLIC BLOOD PRESSURE: 114 MMHG

## 2019-07-15 DIAGNOSIS — R20.0 NUMBNESS AND TINGLING IN RIGHT HAND: Primary | ICD-10-CM

## 2019-07-15 DIAGNOSIS — R20.2 NUMBNESS AND TINGLING IN RIGHT HAND: Primary | ICD-10-CM

## 2019-07-15 PROCEDURE — 99214 OFFICE O/P EST MOD 30 MIN: CPT | Performed by: FAMILY MEDICINE

## 2019-07-15 RX ORDER — SERTRALINE HYDROCHLORIDE 25 MG/1
TABLET, FILM COATED ORAL
Refills: 1 | COMMUNITY
Start: 2019-06-17 | End: 2019-12-02

## 2019-07-15 ASSESSMENT — PATIENT HEALTH QUESTIONNAIRE - PHQ9
SUM OF ALL RESPONSES TO PHQ QUESTIONS 1-9: 4
5. POOR APPETITE OR OVEREATING: SEVERAL DAYS

## 2019-07-15 ASSESSMENT — MIFFLIN-ST. JEOR: SCORE: 1916.11

## 2019-07-15 ASSESSMENT — ANXIETY QUESTIONNAIRES
6. BECOMING EASILY ANNOYED OR IRRITABLE: SEVERAL DAYS
IF YOU CHECKED OFF ANY PROBLEMS ON THIS QUESTIONNAIRE, HOW DIFFICULT HAVE THESE PROBLEMS MADE IT FOR YOU TO DO YOUR WORK, TAKE CARE OF THINGS AT HOME, OR GET ALONG WITH OTHER PEOPLE: SOMEWHAT DIFFICULT
7. FEELING AFRAID AS IF SOMETHING AWFUL MIGHT HAPPEN: NOT AT ALL
GAD7 TOTAL SCORE: 4
2. NOT BEING ABLE TO STOP OR CONTROL WORRYING: NOT AT ALL
3. WORRYING TOO MUCH ABOUT DIFFERENT THINGS: NOT AT ALL
1. FEELING NERVOUS, ANXIOUS, OR ON EDGE: SEVERAL DAYS
5. BEING SO RESTLESS THAT IT IS HARD TO SIT STILL: SEVERAL DAYS

## 2019-07-15 NOTE — PROGRESS NOTES
Subjective     Baldemar Johnson is a 29 year old male who presents to clinic today for the following health issues:    HPI     Adderall refill.    Patient was taking Zoloft but states that this is not working anymore. He would like to discuss an alternative. Patient states that the Zoloft made his depression worse.        Concern - Right hand numbness follow up.   Onset: 2 months     Description:   Right hand numbness.     Intensity: moderate    Progression of Symptoms:  same    Accompanying Signs & Symptoms:  none    Previous history of similar problem:   none    Precipitating factors:   Worsened by: nothing     Alleviating factors:  Improved by: nothing     Therapies Tried and outcome: Methylprednisolone     SUBJECTIVE:  Baldemar  is a 29 year old male who presents for: Follow-up of his tingling in his right hand thumb index finger and middle finger mainly.  Somewhat comes and goes.  But he cannot feel how hard he is gripping something at times.  No known injury.  Feels like it is coming from his elbow.  He was put on a Medrol Dosepak 3 weeks ago but this did not seem to help much.  Also in for follow-up of his ADHD.  Doing okay on his 2 medications.    Past Medical History:   Diagnosis Date     ADHD      Depressive disorder      History reviewed. No pertinent surgical history.  Social History     Tobacco Use     Smoking status: Current Every Day Smoker     Packs/day: 0.50     Start date: 7/12/2006     Smokeless tobacco: Never Used   Substance Use Topics     Alcohol use: Yes     Comment: occ     Current Outpatient Medications   Medication Sig Dispense Refill     amphetamine-dextroamphetamine (ADDERALL XR) 20 MG 24 hr capsule Take 1 capsule (20 mg) by mouth daily 30 capsule 0     amphetamine-dextroamphetamine (ADDERALL) 15 MG tablet Take 1 capsule by mouth daily in the early afternoon 30 tablet 0     IBUPROFEN 200 MG PO CAPS 1-3 CAPS EVERY 6 HOURS AS NEEDED for pain/fever       methylPREDNISolone (MEDROL DOSEPAK) 4 MG  "tablet therapy pack Follow Package Directions 21 tablet 0     TYLENOL 325 MG PO TABS 2 TABLETS EVERY 4 HOURS AS NEEDED for pain/fever       sertraline (ZOLOFT) 25 MG tablet   1       REVIEW OF SYSTEMS:   5 point ROS negative except as noted above in HPI, including Gen., Resp, CV, GI &  system review.     OBJECTIVE:  Vitals: /70 (BP Location: Right arm, Patient Position: Sitting, Cuff Size: Adult Large)   Pulse 96   Temp 97.6  F (36.4  C) (Temporal)   Ht 1.905 m (6' 3\")   Wt 85.5 kg (188 lb 9.6 oz)   SpO2 98%   BMI 23.57 kg/m    BMI= Body mass index is 23.57 kg/m .  He is alert bright affect good eye contact.  Heart with regular rhythm.  Is as noted.  Hand appears normal.  Good color temperature.  Normal pulses.  Good  strength.  Negative Phalen sign.  Negative Tinel sign.  Elbow with full range of motion.    ASSESSMENT:  #1 numbness and tingling in right hand #2 ADHD    PLAN:  Were going to set him up for an EMG.  Renew his 2 different Adderall preparations on a timely fashion.  Has a controlled substance agreement on file from September 2018.        Tomy Becerra MD  [facility]            "

## 2019-07-16 ASSESSMENT — ANXIETY QUESTIONNAIRES: GAD7 TOTAL SCORE: 4

## 2019-08-04 ENCOUNTER — MYC REFILL (OUTPATIENT)
Dept: FAMILY MEDICINE | Facility: CLINIC | Age: 29
End: 2019-08-04

## 2019-08-04 DIAGNOSIS — F90.0 ATTENTION DEFICIT HYPERACTIVITY DISORDER (ADHD), PREDOMINANTLY INATTENTIVE TYPE: ICD-10-CM

## 2019-08-05 RX ORDER — DEXTROAMPHETAMINE SACCHARATE, AMPHETAMINE ASPARTATE MONOHYDRATE, DEXTROAMPHETAMINE SULFATE AND AMPHETAMINE SULFATE 5; 5; 5; 5 MG/1; MG/1; MG/1; MG/1
20 CAPSULE, EXTENDED RELEASE ORAL DAILY
Qty: 30 CAPSULE | Refills: 0 | Status: SHIPPED | OUTPATIENT
Start: 2019-08-05 | End: 2019-09-16

## 2019-08-05 RX ORDER — DEXTROAMPHETAMINE SACCHARATE, AMPHETAMINE ASPARTATE, DEXTROAMPHETAMINE SULFATE AND AMPHETAMINE SULFATE 3.75; 3.75; 3.75; 3.75 MG/1; MG/1; MG/1; MG/1
TABLET ORAL
Qty: 30 TABLET | Refills: 0 | Status: SHIPPED | OUTPATIENT
Start: 2019-08-05 | End: 2019-09-16

## 2019-08-05 NOTE — TELEPHONE ENCOUNTER
2 Signed original RX delivered to [facility]. Emily,

## 2019-08-05 NOTE — TELEPHONE ENCOUNTER
Adderall XR20 MG       Last Written Prescription Date:  7/8/19  Last Fill Quantity: 30,   # refills: 0  Last Office Visit: 7/15/19  Future Office visit:    Next 5 appointments (look out 90 days)    Aug 09, 2019 10:40 AM CDT  Maricarmen Jackson with Tomy Becerra MD  [facility] ([address] [address]  [phone]           Routing refill request to provider for review/approval because:  Drug not on the [facility], [facility] or [facility] refill protocol or controlled substance  Adderall 15 MG       Last Written Prescription Date:  7/8/19  Last Fill Quantity: 30,   # refills: 0  Last Office Visit: 7/15/19  Future Office visit:    Next 5 appointments (look out 90 days)    Aug 09, 2019 10:40 AM CDT  MyChart Short with Tomy Becerra MD  [facility] ([address] [address]  [phone]           Routing refill request to provider for review/approval because:  Drug not on the [facility], [facility] or [facility] refill protocol or controlled substance

## 2019-09-16 ENCOUNTER — MYC REFILL (OUTPATIENT)
Dept: FAMILY MEDICINE | Facility: CLINIC | Age: 29
End: 2019-09-16

## 2019-09-16 DIAGNOSIS — F90.0 ATTENTION DEFICIT HYPERACTIVITY DISORDER (ADHD), PREDOMINANTLY INATTENTIVE TYPE: ICD-10-CM

## 2019-09-16 RX ORDER — DEXTROAMPHETAMINE SACCHARATE, AMPHETAMINE ASPARTATE, DEXTROAMPHETAMINE SULFATE AND AMPHETAMINE SULFATE 3.75; 3.75; 3.75; 3.75 MG/1; MG/1; MG/1; MG/1
TABLET ORAL
Qty: 30 TABLET | Refills: 0 | Status: SHIPPED | OUTPATIENT
Start: 2019-09-16 | End: 2019-10-15

## 2019-09-16 RX ORDER — DEXTROAMPHETAMINE SACCHARATE, AMPHETAMINE ASPARTATE MONOHYDRATE, DEXTROAMPHETAMINE SULFATE AND AMPHETAMINE SULFATE 5; 5; 5; 5 MG/1; MG/1; MG/1; MG/1
20 CAPSULE, EXTENDED RELEASE ORAL DAILY
Qty: 30 CAPSULE | Refills: 0 | Status: SHIPPED | OUTPATIENT
Start: 2019-09-16 | End: 2019-10-15

## 2019-09-16 NOTE — TELEPHONE ENCOUNTER
Both Adderals      Last Written Prescription Date:  8/06/19  Last Fill Quantity: 30,   # refills: 0  Last Office Visit: 7/15/19  Future Office visit:       Routing refill request to provider for review/approval because:  Drug not on the [facility], [facility] or [facility] refill protocol or controlled substance

## 2019-10-15 ENCOUNTER — MYC REFILL (OUTPATIENT)
Dept: FAMILY MEDICINE | Facility: CLINIC | Age: 29
End: 2019-10-15

## 2019-10-15 DIAGNOSIS — F90.0 ATTENTION DEFICIT HYPERACTIVITY DISORDER (ADHD), PREDOMINANTLY INATTENTIVE TYPE: ICD-10-CM

## 2019-10-16 RX ORDER — DEXTROAMPHETAMINE SACCHARATE, AMPHETAMINE ASPARTATE, DEXTROAMPHETAMINE SULFATE AND AMPHETAMINE SULFATE 3.75; 3.75; 3.75; 3.75 MG/1; MG/1; MG/1; MG/1
TABLET ORAL
Qty: 30 TABLET | Refills: 0 | Status: SHIPPED | OUTPATIENT
Start: 2019-10-16 | End: 2019-11-14

## 2019-10-16 RX ORDER — DEXTROAMPHETAMINE SACCHARATE, AMPHETAMINE ASPARTATE MONOHYDRATE, DEXTROAMPHETAMINE SULFATE AND AMPHETAMINE SULFATE 5; 5; 5; 5 MG/1; MG/1; MG/1; MG/1
20 CAPSULE, EXTENDED RELEASE ORAL DAILY
Qty: 30 CAPSULE | Refills: 0 | Status: SHIPPED | OUTPATIENT
Start: 2019-10-16 | End: 2019-11-14

## 2019-10-16 NOTE — TELEPHONE ENCOUNTER
Both Adderall      Last Written Prescription Date:  9/16/19  Last Fill Quantity: 30,   # refills: 0  Last Office Visit: 10/09/19  Future Office visit:       Routing refill request to provider for review/approval because:  Drug not on the [facility], [facility] or [facility] refill protocol or controlled substance

## 2019-11-14 ENCOUNTER — MYC REFILL (OUTPATIENT)
Dept: FAMILY MEDICINE | Facility: CLINIC | Age: 29
End: 2019-11-14

## 2019-11-14 DIAGNOSIS — F90.0 ATTENTION DEFICIT HYPERACTIVITY DISORDER (ADHD), PREDOMINANTLY INATTENTIVE TYPE: ICD-10-CM

## 2019-11-14 RX ORDER — DEXTROAMPHETAMINE SACCHARATE, AMPHETAMINE ASPARTATE MONOHYDRATE, DEXTROAMPHETAMINE SULFATE AND AMPHETAMINE SULFATE 5; 5; 5; 5 MG/1; MG/1; MG/1; MG/1
20 CAPSULE, EXTENDED RELEASE ORAL DAILY
Qty: 30 CAPSULE | Refills: 0 | Status: CANCELLED | OUTPATIENT
Start: 2019-11-14

## 2019-11-14 RX ORDER — DEXTROAMPHETAMINE SACCHARATE, AMPHETAMINE ASPARTATE, DEXTROAMPHETAMINE SULFATE AND AMPHETAMINE SULFATE 3.75; 3.75; 3.75; 3.75 MG/1; MG/1; MG/1; MG/1
TABLET ORAL
Qty: 30 TABLET | Refills: 0 | Status: CANCELLED | OUTPATIENT
Start: 2019-11-14

## 2019-11-14 RX ORDER — DEXTROAMPHETAMINE SACCHARATE, AMPHETAMINE ASPARTATE MONOHYDRATE, DEXTROAMPHETAMINE SULFATE AND AMPHETAMINE SULFATE 5; 5; 5; 5 MG/1; MG/1; MG/1; MG/1
20 CAPSULE, EXTENDED RELEASE ORAL DAILY
Qty: 30 CAPSULE | Refills: 0 | Status: SHIPPED | OUTPATIENT
Start: 2019-11-14 | End: 2019-12-17

## 2019-11-14 RX ORDER — DEXTROAMPHETAMINE SACCHARATE, AMPHETAMINE ASPARTATE, DEXTROAMPHETAMINE SULFATE AND AMPHETAMINE SULFATE 3.75; 3.75; 3.75; 3.75 MG/1; MG/1; MG/1; MG/1
TABLET ORAL
Qty: 30 TABLET | Refills: 0 | Status: SHIPPED | OUTPATIENT
Start: 2019-11-14 | End: 2019-12-17

## 2019-11-14 NOTE — TELEPHONE ENCOUNTER
Adderall 15 MG       Last Written Prescription Date:  10/16/19  Last Fill Quantity: 30,   # refills: 0  Last Office Visit: 6-19-19  Future Office visit:    Next 5 appointments (look out 90 days)    Dec 02, 2019  1:00 PM CST  Office Visit with Tomy Becerra MD  [facility] ([address] [address]  [phone]           Routing refill request to provider for review/approval because:  Drug not on the [facility], [facility] or [facility] refill protocol or controlled substance  Adderall XR 20 MG        Last Written Prescription Date:  10/16/19  Last Fill Quantity: 30,   # refills: 0  Last Office Visit: 6-19-19  Future Office visit:    Next 5 appointments (look out 90 days)    Dec 02, 2019  1:00 PM CST  Office Visit with Tomy Becerra MD  [facility] ([address] [address]  [phone]           Routing refill request to provider for review/approval because:  Drug not on the [facility], [facility] or [facility] refill protocol or controlled substance

## 2019-11-23 ENCOUNTER — MYC MEDICAL ADVICE (OUTPATIENT)
Dept: FAMILY MEDICINE | Facility: CLINIC | Age: 29
End: 2019-11-23

## 2019-12-02 ENCOUNTER — OFFICE VISIT (OUTPATIENT)
Dept: FAMILY MEDICINE | Facility: CLINIC | Age: 29
End: 2019-12-02
Payer: COMMERCIAL

## 2019-12-02 VITALS
DIASTOLIC BLOOD PRESSURE: 72 MMHG | OXYGEN SATURATION: 100 % | BODY MASS INDEX: 23.38 KG/M2 | HEART RATE: 100 BPM | SYSTOLIC BLOOD PRESSURE: 116 MMHG | WEIGHT: 188 LBS | TEMPERATURE: 96.6 F | RESPIRATION RATE: 18 BRPM | HEIGHT: 75 IN

## 2019-12-02 DIAGNOSIS — Z23 NEED FOR VACCINATION: ICD-10-CM

## 2019-12-02 DIAGNOSIS — F90.2 ATTENTION DEFICIT HYPERACTIVITY DISORDER (ADHD), COMBINED TYPE: ICD-10-CM

## 2019-12-02 DIAGNOSIS — Z00.00 ROUTINE GENERAL MEDICAL EXAMINATION AT A HEALTH CARE FACILITY: Primary | ICD-10-CM

## 2019-12-02 PROCEDURE — 99213 OFFICE O/P EST LOW 20 MIN: CPT | Mod: 25 | Performed by: FAMILY MEDICINE

## 2019-12-02 PROCEDURE — 90471 IMMUNIZATION ADMIN: CPT | Performed by: FAMILY MEDICINE

## 2019-12-02 PROCEDURE — 90686 IIV4 VACC NO PRSV 0.5 ML IM: CPT | Performed by: FAMILY MEDICINE

## 2019-12-02 PROCEDURE — 99395 PREV VISIT EST AGE 18-39: CPT | Mod: 25 | Performed by: FAMILY MEDICINE

## 2019-12-02 ASSESSMENT — MIFFLIN-ST. JEOR: SCORE: 1913.39

## 2019-12-02 ASSESSMENT — PAIN SCALES - GENERAL: PAINLEVEL: NO PAIN (0)

## 2019-12-02 NOTE — LETTER
[facility]  12/02/19    Patient: Baldemar Johnson  YOB: 1990  Medical Record Number: [id number]                                                                  Opioid / Opioid Plus Controlled Substance Agreement    I understand that my care provider has prescribed an opioid (narcotic) controlled substance to help manage my condition(s). I am taking this medicine to help me function or work. I know this is strong medicine, and that it can cause serious side effects. Opioid medicine can be sedating, addicting and may cause a dependency on the drug. They can affect my ability to drive or think, and cause depression. They need to be taken exactly as prescribed. Combining opioids with certain medicines or chemicals (such as cocaine, sedatives and tranquilizers, sleeping pills, meth) can be dangerous or even fatal. Also, if I stop opioids suddenly, I may have severe withdrawal symptoms. Last, I understand that opioids do not work for all types of pain nor for all patients. If not helpful, I may be asked to stop them.        The risks, benefits, and side effects of these medicine(s) were explained to me. I agree that:    1. I will take part in other treatments as advised by my care team. This may be psychiatry or counseling, physical therapy, behavioral therapy, group treatment or a referral to a pain clinic. I will reduce or stop my medicine when my care team tells me to do so.  2. I will take my medicines as prescribed. I will not change the dose or schedule unless my care team tells me to. There will be no refills if I  run out early.   I may be contactedwithout warning and asked to complete a urine drug test or pill count at any time.   3. I will keep all my appointments, and understand this is part of the monitoring of opioids. My care team may require an office visit for EVERY opioid/controlled substance refill. If I miss appointments or don t follow instructions, my care team  may stop my medicine.  4. I will not ask other providers to prescribe controlled substances, and I will not accept controlled substances from other people. If I need another prescribed controlled substance for a new reason, I will tell my care team within 1 business day.  5. I will use one pharmacy to fill all of my controlled substance prescriptions, and it is up to me to make sure that I do not run out of my medicines on weekends or holidays. If my care team is willing to refill my opioid prescription without a visit, I must request refills only during office hours, refills may take up to 3 days to process, and it may take up to 5 to 7 days for my medicine to be mailed and ready at my pharmacy. Prescriptions will not be mailed anywhere except my pharmacy.        [id number]  Rev 12/18         Registration to scan to EHR                             Page 1 of 2               Controlled Substance Agreement Opioid        [facility]  12/02/19  Patient: Baldemar Johnson  YOB: 1990  Medical Record Number: [id number]                                                                  6. I am responsible for my prescriptions. If the medicine/prescription is lost or stolen, it will not be replaced. I also agree not to share controlled substance medicines with anyone.  7. I agree to not use ANY illegal or recreational drugs. This includes marijuana, cocaine, bath salts or other drugs. I agree not to use alcohol unless my care team says I may.          I agree to give urine samples whenever asked. If I don t give a urine sample, the care team may stop my medicine.    8. If I enroll in the [place] Medical Marijuana program, I will tell my care team. I will also sign an agreement to share my medical records with my care team.   9. I will bring in my list of medicines (or my medicine bottles) each time I come to the clinic.   10. I will tell my care team right away if I become pregnant or have a  new medical problem treated outside of my regular clinic.  11. I understand that this medicine can affect my thinking and judgment. It may be unsafe for me to drive, use machinery and do dangerous tasks. I will not do any of these things until I know how the medicine affects me. If my dose changes, I will wait to see how it affects me. I will contact my care team if I have concerns about medicine side effects.    I understand that if I do not follow any of the conditions above, my prescriptions or treatment may be stopped.      I agree that my provider, clinic care team, and pharmacy may work with any city, state or federal law enforcement agency that investigates the misuse, sale, or other diversion of my controlled medicine. I will allow my provider to discuss my care with or share a copy of this agreement with any other treating provider, pharmacy or emergency room where I receive care. I agree to give up (waive) any right of privacy or confidentiality with respect to these consents.     I have read this agreement and have asked questions about anything I did not understand.      ________________________________________________________________________  Patient signature - Date/Time -  Baldemar Johnson                                      ________________________________________________________________________  Witness signature                                                            ________________________________________________________________________  Provider signature - Tomy Becerra MD      [id number]  Rev 12/18         Registration to scan to EHR                         Page 2 of 2                   Controlled Substance Agreement Opioid           Page 1 of 2  Opioid Pain Medicines (also known as Narcotics)  What You Need to Know    What are opioids?   Opioids are pain medicines that must be prescribed by a doctor.  They are also known as narcotics.    Examples are:     morphine (MS Contin,  Pilar)    oxycodone (Oxycontin)    oxycodone and acetaminophen (Percocet)    hydrocodone and acetaminophen (Vicodin, Norco)     fentanyl patch (Duragesic)     hydromorphone (Dilaudid)     methadone     What do opioids do well?   Opioids are best for short-term pain after a surgery or injury. They also work well for cancer pain. Unlike other pain medicines, they do not cause liver or kidney failure or ulcers. They may help some people with long-lasting (chronic) pain.     What do opioids NOT do well?   Opioids never get rid of pain entirely, and they do not work well for most patients with chronic pain. Opioids do not reduce swelling, one of the causes of pain. They also don t work well for nerve pain.                           For informational purposes only.  Not to replace the advice of your care provider.  Copyright [facility]. All right reserved. [employer] 345245-Ehn 02/18.      Page 2 of 2    Risks and side effects   Talk to your doctor before you start or decide to keep taking one of these medicines. Side effects include:    Lowering your breathing rate enough to cause death    Overdose, including death, especially if taking higher than prescribed doses    Long-term opioid use    Worse depression symptoms; less pleasure in things you usually enjoy    Feeling tired or sluggish    Slower thoughts or cloudy thinking    Being more sensitive to pain over time; pain is harder to control    Trouble sleeping or restless sleep    Changes in hormone levels (for example, less testosterone)    Changes in sex drive or ability to have sex    Constipation    Unsafe driving    Itching and sweating    Feeling dizzy    Nausea, vomiting and dry mouth    What else should I know about opioids?  When someone takes opioids for too long or too often, they become dependent. This means that if you stop or reduce the medicine too quickly, you will have withdrawal symptoms.    Dependence is not the same as addiction.  Addiction is when people keep using a substance that harms their body, their mind or their relations with others. If you have a history of drug or alcohol abuse, taking opioids can cause a relapse.    Over time, opioids don t work as well. Most people will need higher and higher doses. The higher the dose, the more serious the side effects. We don t know the long-term effects of opioids.      Prescribed opioids aren't the best way to manage chronic pain    Other ways to manage pain include:      Ibuprofen or acetaminophen.  You should always try this first.      Treat health problems that may be causing pain.      acupuncture or massage, deep breathing, meditation, visual imagery, aromatherapy.      Use heat or ice at the pain site      Physical therapy and exercise      Stop smoking      See a counselor or therapist                                                  People who have used opioids for a long time may have a lower quality of life, worse depression, higher levels of pain and more visits to doctors.    Never share your opioids with others. Be sure to store opioids in a secure place, locked if possible.Young children can easily swallow them and overdose.     You can overdose on opioids.  Signs of overdose include decrease or loss of consciousness, slowed breathing, trouble waking and blue lips.  If someone is worried about overdose, they should call 911.    If you are at risk for overdose, you may get naloxone (Narcan, a medicine that reverses the effects of opioids.  If you overdose, a friend or family member can give you Narcan while waiting for the ambulance.  They need to know the signs of overdose and how to give Narcan.    While you're taking opioids:    Don't use alcohol or street drugs. Taking them together can cause death.    Don't take any of these medicines unless your doctor says its okay.  Taking these with opioids can cause death.    Benzodiazepines (such as lorazepam         or  diazepam)    Muscle relaxers (such as cyclobenzaprine)    sleeping pills    other opioids    Safe disposal of opioids  Find your area drug take-back program, your pharmacy mail-back program, buy a special disposal bag (such as Deterra) from your pharmacy or flush them down the toilet.  Use the guidelines at:  www.fda.gov/drugs/resourcesforyou

## 2019-12-02 NOTE — PROGRESS NOTES
3  SUBJECTIVE:   CC: Baldemar Johnson is an 29 year old male who presents for preventive health visit.     Healthy Habits:    Do you get at least three servings of calcium containing foods daily (dairy, green leafy vegetables, etc.)? yes    Amount of exercise or daily activities, outside of work: none    Problems taking medications regularly No    Medication side effects: No    Have you had an eye exam in the past two years? no    Do you see a dentist twice per year? no    Do you have sleep apnea, excessive snoring or daytime drowsiness?yes      He wants to discuss his Adderall.  States that one time he is on a higher dose using 2 of the 15 mg immediate release once in the afternoon if needed rather than just one.  He lost insurance it switched.    Today's PHQ-2 Score:   PHQ-2 ( 1999 Pf[employer]) 12/2/2019 2/19/2019   Q1: Little interest or pleasure in doing things 0 0   Q2: Feeling down, depressed or hopeless 0 0   PHQ-2 Score 0 0       Abuse: Current or Past(Physical, Sexual or Emotional)- No  Do you feel safe in your environment? Yes        Social History     Tobacco Use     Smoking status: Current Every Day Smoker     Packs/day: 0.50     Start date: 7/12/2006     Smokeless tobacco: Never Used   Substance Use Topics     Alcohol use: Yes     Comment: occ     If you drink alcohol do you typically have >3 drinks per day or >7 drinks per week? No                      Last PSA: No results found for: PSA    Reviewed orders with patient. Reviewed health maintenance and updated orders accordingly - Yes      Reviewed and updated as needed this visit by clinical staff  Tobacco  Allergies  Meds         Reviewed and updated as needed this visit by Provider        Past Medical History:   Diagnosis Date     ADHD      Depressive disorder       No past surgical history on file.    ROS:  CONSTITUTIONAL: NEGATIVE for fever, chills, change in weight  INTEGUMENTARY/SKIN: NEGATIVE for worrisome rashes, moles or lesions  EYES: NEGATIVE for  "vision changes or irritation  ENT: NEGATIVE for ear, mouth and throat problems  RESP: NEGATIVE for significant cough or SOB  CV: NEGATIVE for chest pain, palpitations or peripheral edema  GI: NEGATIVE for nausea, abdominal pain, heartburn, or change in bowel habits   male: negative for dysuria, hematuria, decreased urinary stream, erectile dysfunction, urethral discharge  MUSCULOSKELETAL: NEGATIVE for significant arthralgias or myalgia  NEURO: NEGATIVE for weakness, dizziness or paresthesias  PSYCHIATRIC: NEGATIVE for changes in mood or affect    OBJECTIVE:   /72   Pulse 100   Temp 96.6  F (35.9  C) (Temporal)   Resp 18   Ht 1.905 m (6' 3\")   Wt 85.3 kg (188 lb)   SpO2 100%   BMI 23.50 kg/m    EXAM:  GENERAL: healthy, alert and no distress  EYES: Eyes grossly normal to inspection, PERRL and conjunctivae and sclerae normal  HENT: ear canals and TM's normal, nose and mouth without ulcers or lesions  NECK: no adenopathy, no asymmetry, masses, or scars and thyroid normal to palpation  RESP: lungs clear to auscultation - no rales, rhonchi or wheezes  CV: regular rate and rhythm, normal S1 S2, no S3 or S4, no murmur, click or rub, no peripheral edema and peripheral pulses strong  ABDOMEN: soft, nontender, no hepatosplenomegaly, no masses and bowel sounds normal  MS: no gross musculoskeletal defects noted, no edema  SKIN: Acne scarring on the face  NEURO: Normal strength and tone, mentation intact and speech normal  PSYCH: mentation appears normal, affect normal/bright    Diagnostic Test Results:  none     ASSESSMENT/PLAN:   1. Routine general medical examination at a health care facility  Generally healthy.  - 1st  Administration  [[id number]]    2. Attention deficit hyperactivity disorder (ADHD), combined type  He seems stable with this.  I I talked him in the just going on just one 15 mg immediate release in the afternoon.  His weight is stable vitals are stable on this and he is getting his job done.  " "Controlled substance agreement was signed.  Had a urine drug screen in April.    3. Need for vaccination  Flu shot  - 1st  Administration  [[id number]]    COUNSELING:  Reviewed preventive health counseling, as reflected in patient instructions       Regular exercise       Healthy diet/nutrition    Estimated body mass index is 23.5 kg/m  as calculated from the following:    Height as of this encounter: 1.905 m (6' 3\").    Weight as of this encounter: 85.3 kg (188 lb).         reports that he has been smoking. He started smoking about 13 years ago. He has been smoking about 0.50 packs per day. He has never used smokeless tobacco.      Counseling Resources:  ATP IV Guidelines  Pooled Cohorts Equation Calculator  FRAX Risk Assessment  ICSI Preventive Guidelines  Dietary Guidelines for Americans, 2010  USDA's MyPlate  ASA Prophylaxis  Lung CA Screening    Tomy Becerra MD  [facility]  "

## 2019-12-02 NOTE — PROGRESS NOTES
Prior to immunization administration, verified patients identity using patient s name and date of birth. Please see Immunization Activity for additional information.     Screening Questionnaire for Adult Immunization    Are you sick today?   No   Do you have allergies to medications, food, a vaccine component or latex?   No   Have you ever had a serious reaction after receiving a vaccination?   No   Do you have a long-term health problem with heart disease, lung disease, asthma, kidney disease, metabolic disease (e.g. diabetes), anemia, or other blood disorder?   No   Do you have cancer, leukemia, HIV/AIDS, or any other immune system problem?   No   In the past 3 months, have you taken medications that affect  your immune system, such as prednisone, other steroids, or anticancer drugs; drugs for the treatment of rheumatoid arthritis, Crohn s disease, or psoriasis; or have you had radiation treatments?   No   Have you had a seizure, or a brain or other nervous system problem?   No   During the past year, have you received a transfusion of blood or blood     products, or been given immune (gamma) globulin or antiviral drug?   No   For women: Are you pregnant or is there a chance you could become        pregnant during the next month?   No   Have you received any vaccinations in the past 4 weeks?   No     Immunization questionnaire answers were all negative.        Per orders of Dr. Becerra, injection of flu  given by Stella Hillman MA. Patient instructed to remain in clinic for 15 minutes afterwards, and to report any adverse reaction to me immediately.       Screening performed by Stella Hillman MA on 12/2/2019 at 1:31 PM.

## 2019-12-17 ENCOUNTER — MYC REFILL (OUTPATIENT)
Dept: FAMILY MEDICINE | Facility: CLINIC | Age: 29
End: 2019-12-17

## 2019-12-17 ENCOUNTER — MYC MEDICAL ADVICE (OUTPATIENT)
Dept: FAMILY MEDICINE | Facility: CLINIC | Age: 29
End: 2019-12-17

## 2019-12-17 DIAGNOSIS — F90.0 ATTENTION DEFICIT HYPERACTIVITY DISORDER (ADHD), PREDOMINANTLY INATTENTIVE TYPE: ICD-10-CM

## 2019-12-17 RX ORDER — DEXTROAMPHETAMINE SACCHARATE, AMPHETAMINE ASPARTATE, DEXTROAMPHETAMINE SULFATE AND AMPHETAMINE SULFATE 3.75; 3.75; 3.75; 3.75 MG/1; MG/1; MG/1; MG/1
TABLET ORAL
Qty: 30 TABLET | Refills: 0 | OUTPATIENT
Start: 2019-12-17

## 2019-12-17 RX ORDER — DEXTROAMPHETAMINE SACCHARATE, AMPHETAMINE ASPARTATE MONOHYDRATE, DEXTROAMPHETAMINE SULFATE AND AMPHETAMINE SULFATE 5; 5; 5; 5 MG/1; MG/1; MG/1; MG/1
CAPSULE, EXTENDED RELEASE ORAL
Qty: 30 CAPSULE | Refills: 0 | OUTPATIENT
Start: 2019-12-17

## 2019-12-17 NOTE — TELEPHONE ENCOUNTER
(ADDERALL XR) 20 MG      Last Written Prescription Date:  11/14/19  Last Fill Quantity: 30,   # refills: 0  Last Office Visit: 12/2/19  Future Office visit:       Routing refill request to provider for review/approval because:  Drug not on the [facility], [facility] or [facility] refill protocol or controlled substance    Adderall 15mg      Last Written Prescription Date:  11/14/19  Last Fill Quantity: 30,   # refills: 0  Last Office Visit: 12/2/19  Future Office visit:       Routing refill request to provider for review/approval because:  Drug not on the [facility], [facility] or [facility] refill protocol or controlled substance

## 2019-12-18 RX ORDER — DEXTROAMPHETAMINE SACCHARATE, AMPHETAMINE ASPARTATE MONOHYDRATE, DEXTROAMPHETAMINE SULFATE AND AMPHETAMINE SULFATE 5; 5; 5; 5 MG/1; MG/1; MG/1; MG/1
20 CAPSULE, EXTENDED RELEASE ORAL DAILY
Qty: 30 CAPSULE | Refills: 0 | Status: SHIPPED | OUTPATIENT
Start: 2019-12-18 | End: 2020-01-14

## 2019-12-18 RX ORDER — DEXTROAMPHETAMINE SACCHARATE, AMPHETAMINE ASPARTATE, DEXTROAMPHETAMINE SULFATE AND AMPHETAMINE SULFATE 3.75; 3.75; 3.75; 3.75 MG/1; MG/1; MG/1; MG/1
TABLET ORAL
Qty: 30 TABLET | Refills: 0 | Status: SHIPPED | OUTPATIENT
Start: 2019-12-18 | End: 2020-01-14

## 2020-01-14 ENCOUNTER — MYC REFILL (OUTPATIENT)
Dept: FAMILY MEDICINE | Facility: CLINIC | Age: 30
End: 2020-01-14

## 2020-01-14 DIAGNOSIS — F90.0 ATTENTION DEFICIT HYPERACTIVITY DISORDER (ADHD), PREDOMINANTLY INATTENTIVE TYPE: ICD-10-CM

## 2020-01-14 NOTE — TELEPHONE ENCOUNTER
adderall xr      Last Written Prescription Date:  12/18/19  Last Fill Quantity: 30,   # refills: 0  Last Office Visit: 12/02/19  Future Office visit:       Routing refill request to provider for review/approval because:  Drug not on the [facility], [facility] or [facility] refill protocol or controlled substance    adderall      Last Written Prescription Date:  12/18/19  Last Fill Quantity: 30,   # refills: 0  Last Office Visit: 12/02/19  Future Office visit:       Routing refill request to provider for review/approval because:  Drug not on the [facility], [facility] or [facility] refill protocol or controlled substance

## 2020-01-15 RX ORDER — DEXTROAMPHETAMINE SACCHARATE, AMPHETAMINE ASPARTATE MONOHYDRATE, DEXTROAMPHETAMINE SULFATE AND AMPHETAMINE SULFATE 5; 5; 5; 5 MG/1; MG/1; MG/1; MG/1
20 CAPSULE, EXTENDED RELEASE ORAL DAILY
Qty: 30 CAPSULE | Refills: 0 | Status: SHIPPED | OUTPATIENT
Start: 2020-01-15 | End: 2020-02-13

## 2020-01-15 RX ORDER — DEXTROAMPHETAMINE SACCHARATE, AMPHETAMINE ASPARTATE, DEXTROAMPHETAMINE SULFATE AND AMPHETAMINE SULFATE 3.75; 3.75; 3.75; 3.75 MG/1; MG/1; MG/1; MG/1
TABLET ORAL
Qty: 30 TABLET | Refills: 0 | Status: SHIPPED | OUTPATIENT
Start: 2020-01-15 | End: 2020-02-13

## 2020-02-13 ENCOUNTER — MYC REFILL (OUTPATIENT)
Dept: FAMILY MEDICINE | Facility: CLINIC | Age: 30
End: 2020-02-13

## 2020-02-13 DIAGNOSIS — F90.0 ATTENTION DEFICIT HYPERACTIVITY DISORDER (ADHD), PREDOMINANTLY INATTENTIVE TYPE: ICD-10-CM

## 2020-02-13 RX ORDER — DEXTROAMPHETAMINE SACCHARATE, AMPHETAMINE ASPARTATE, DEXTROAMPHETAMINE SULFATE AND AMPHETAMINE SULFATE 3.75; 3.75; 3.75; 3.75 MG/1; MG/1; MG/1; MG/1
TABLET ORAL
Qty: 30 TABLET | Refills: 0 | Status: SHIPPED | OUTPATIENT
Start: 2020-02-13 | End: 2020-03-17

## 2020-02-13 RX ORDER — DEXTROAMPHETAMINE SACCHARATE, AMPHETAMINE ASPARTATE MONOHYDRATE, DEXTROAMPHETAMINE SULFATE AND AMPHETAMINE SULFATE 5; 5; 5; 5 MG/1; MG/1; MG/1; MG/1
20 CAPSULE, EXTENDED RELEASE ORAL DAILY
Qty: 30 CAPSULE | Refills: 0 | Status: SHIPPED | OUTPATIENT
Start: 2020-02-13 | End: 2020-03-17

## 2020-02-13 NOTE — TELEPHONE ENCOUNTER
Adderall Xr 20 Mg        Last Written Prescription Date:    Last Fill Quantity: 1/15/2020,   # refills: 30  Last Office Visit: 12/2/19  Future Office visit:       Routing refill request to provider for review/approval because:  Drug not on the [facility], [facility] or [facility] refill protocol or controlled substance  Adderall 15 MG     Last Written Prescription Date:  1/15/2020  Last Fill Quantity: 30,   # refills: 0  Last Office Visit: 12/2/19  Future Office visit:       Routing refill request to provider for review/approval because:  Drug not on the [facility], [facility] or [facility] refill protocol or controlled substance

## 2020-02-17 ENCOUNTER — MYC REFILL (OUTPATIENT)
Dept: FAMILY MEDICINE | Facility: CLINIC | Age: 30
End: 2020-02-17

## 2020-02-17 DIAGNOSIS — F90.0 ATTENTION DEFICIT HYPERACTIVITY DISORDER (ADHD), PREDOMINANTLY INATTENTIVE TYPE: ICD-10-CM

## 2020-02-17 RX ORDER — DEXTROAMPHETAMINE SACCHARATE, AMPHETAMINE ASPARTATE, DEXTROAMPHETAMINE SULFATE AND AMPHETAMINE SULFATE 3.75; 3.75; 3.75; 3.75 MG/1; MG/1; MG/1; MG/1
TABLET ORAL
Qty: 30 TABLET | Refills: 0 | Status: CANCELLED | OUTPATIENT
Start: 2020-02-17

## 2020-02-17 RX ORDER — DEXTROAMPHETAMINE SACCHARATE, AMPHETAMINE ASPARTATE MONOHYDRATE, DEXTROAMPHETAMINE SULFATE AND AMPHETAMINE SULFATE 5; 5; 5; 5 MG/1; MG/1; MG/1; MG/1
20 CAPSULE, EXTENDED RELEASE ORAL DAILY
Qty: 30 CAPSULE | Refills: 0 | Status: CANCELLED | OUTPATIENT
Start: 2020-02-17

## 2020-03-16 ENCOUNTER — MYC REFILL (OUTPATIENT)
Dept: FAMILY MEDICINE | Facility: CLINIC | Age: 30
End: 2020-03-16

## 2020-03-16 DIAGNOSIS — F90.0 ATTENTION DEFICIT HYPERACTIVITY DISORDER (ADHD), PREDOMINANTLY INATTENTIVE TYPE: ICD-10-CM

## 2020-03-17 ENCOUNTER — MYC MEDICAL ADVICE (OUTPATIENT)
Dept: FAMILY MEDICINE | Facility: CLINIC | Age: 30
End: 2020-03-17

## 2020-03-17 ENCOUNTER — MYC REFILL (OUTPATIENT)
Dept: FAMILY MEDICINE | Facility: CLINIC | Age: 30
End: 2020-03-17

## 2020-03-17 DIAGNOSIS — F90.0 ATTENTION DEFICIT HYPERACTIVITY DISORDER (ADHD), PREDOMINANTLY INATTENTIVE TYPE: ICD-10-CM

## 2020-03-17 RX ORDER — DEXTROAMPHETAMINE SACCHARATE, AMPHETAMINE ASPARTATE, DEXTROAMPHETAMINE SULFATE AND AMPHETAMINE SULFATE 3.75; 3.75; 3.75; 3.75 MG/1; MG/1; MG/1; MG/1
TABLET ORAL
Qty: 30 TABLET | Refills: 0 | Status: CANCELLED | OUTPATIENT
Start: 2020-03-17

## 2020-03-17 RX ORDER — DEXTROAMPHETAMINE SACCHARATE, AMPHETAMINE ASPARTATE, DEXTROAMPHETAMINE SULFATE AND AMPHETAMINE SULFATE 3.75; 3.75; 3.75; 3.75 MG/1; MG/1; MG/1; MG/1
TABLET ORAL
Qty: 30 TABLET | Refills: 0 | Status: SHIPPED | OUTPATIENT
Start: 2020-03-17 | End: 2020-04-13

## 2020-03-17 RX ORDER — DEXTROAMPHETAMINE SACCHARATE, AMPHETAMINE ASPARTATE MONOHYDRATE, DEXTROAMPHETAMINE SULFATE AND AMPHETAMINE SULFATE 5; 5; 5; 5 MG/1; MG/1; MG/1; MG/1
20 CAPSULE, EXTENDED RELEASE ORAL DAILY
Qty: 30 CAPSULE | Refills: 0 | Status: SHIPPED | OUTPATIENT
Start: 2020-03-17 | End: 2020-04-13

## 2020-03-17 RX ORDER — DEXTROAMPHETAMINE SACCHARATE, AMPHETAMINE ASPARTATE MONOHYDRATE, DEXTROAMPHETAMINE SULFATE AND AMPHETAMINE SULFATE 5; 5; 5; 5 MG/1; MG/1; MG/1; MG/1
20 CAPSULE, EXTENDED RELEASE ORAL DAILY
Qty: 30 CAPSULE | Refills: 0 | Status: CANCELLED | OUTPATIENT
Start: 2020-03-17

## 2020-03-17 NOTE — TELEPHONE ENCOUNTER
amphetamine-dextroamphetamine (ADDERALL XR) 20 MG 24 hr capsule   Last Written Prescription Date:  02/13/2020  Last Fill Quantity: 30,   # refills: 0  Last Office Visit: 12/2/2019  Future Office visit:       Routing refill request to provider for review/approval because:  Drug not on the [facility], [facility] or [facility] refill protocol or controlled substance            amphetamine-dextroamphetamine (ADDERALL) 15 MG tablet       Last Written Prescription Date:  02/13/2020  Last Fill Quantity: 30,   # refills: 0  Last Office Visit: 12/02/2019  Future Office visit:       Routing refill request to provider for review/approval because:  Drug not on the [facility], [facility] or [facility] refill protocol or controlled substance

## 2020-03-17 NOTE — TELEPHONE ENCOUNTER
adderall 15mg       Last Written Prescription Date:  2/13/20  Last Fill Quantity: 30,   # refills: 0  Last Office Visit: 12/2/19  Future Office visit:       Routing refill request to provider for review/approval because:  Drug not on the [facility], [facility] or [facility] refill protocol or controlled substance  adderall 20mg       Last Written Prescription Date:  2/13/20  Last Fill Quantity: 30,   # refills: 0  Last Office Visit: 12/2/19  Future Office visit:       Routing refill request to provider for review/approval because:  Drug not on the [facility], [facility] or [facility] refill protocol or controlled substance

## 2020-04-13 ENCOUNTER — MYC REFILL (OUTPATIENT)
Dept: FAMILY MEDICINE | Facility: CLINIC | Age: 30
End: 2020-04-13

## 2020-04-13 DIAGNOSIS — F90.0 ATTENTION DEFICIT HYPERACTIVITY DISORDER (ADHD), PREDOMINANTLY INATTENTIVE TYPE: ICD-10-CM

## 2020-04-13 RX ORDER — DEXTROAMPHETAMINE SACCHARATE, AMPHETAMINE ASPARTATE MONOHYDRATE, DEXTROAMPHETAMINE SULFATE AND AMPHETAMINE SULFATE 5; 5; 5; 5 MG/1; MG/1; MG/1; MG/1
20 CAPSULE, EXTENDED RELEASE ORAL DAILY
Qty: 30 CAPSULE | Refills: 0 | Status: SHIPPED | OUTPATIENT
Start: 2020-04-13 | End: 2020-05-12

## 2020-04-13 RX ORDER — DEXTROAMPHETAMINE SACCHARATE, AMPHETAMINE ASPARTATE, DEXTROAMPHETAMINE SULFATE AND AMPHETAMINE SULFATE 3.75; 3.75; 3.75; 3.75 MG/1; MG/1; MG/1; MG/1
TABLET ORAL
Qty: 30 TABLET | Refills: 0 | Status: SHIPPED | OUTPATIENT
Start: 2020-04-13 | End: 2020-05-12

## 2020-04-13 NOTE — TELEPHONE ENCOUNTER
adderall xr 20mg and Adderall 15mg     Last Written Prescription Date:  3/17/20  Last Fill Quantity: 30,   # refills: 0  Last Office Visit: 12/02/19  Future Office visit:       Routing refill request to provider for review/approval because:  Drug not on the [facility], [facility] or [facility] refill protocol or controlled substance

## 2020-05-12 ENCOUNTER — MYC REFILL (OUTPATIENT)
Dept: FAMILY MEDICINE | Facility: CLINIC | Age: 30
End: 2020-05-12

## 2020-05-12 DIAGNOSIS — F90.0 ATTENTION DEFICIT HYPERACTIVITY DISORDER (ADHD), PREDOMINANTLY INATTENTIVE TYPE: ICD-10-CM

## 2020-05-13 RX ORDER — DEXTROAMPHETAMINE SACCHARATE, AMPHETAMINE ASPARTATE, DEXTROAMPHETAMINE SULFATE AND AMPHETAMINE SULFATE 3.75; 3.75; 3.75; 3.75 MG/1; MG/1; MG/1; MG/1
TABLET ORAL
Qty: 30 TABLET | Refills: 0 | Status: SHIPPED | OUTPATIENT
Start: 2020-05-13 | End: 2020-06-16

## 2020-05-13 RX ORDER — DEXTROAMPHETAMINE SACCHARATE, AMPHETAMINE ASPARTATE MONOHYDRATE, DEXTROAMPHETAMINE SULFATE AND AMPHETAMINE SULFATE 5; 5; 5; 5 MG/1; MG/1; MG/1; MG/1
20 CAPSULE, EXTENDED RELEASE ORAL DAILY
Qty: 30 CAPSULE | Refills: 0 | Status: SHIPPED | OUTPATIENT
Start: 2020-05-13 | End: 2020-06-16

## 2020-05-13 NOTE — TELEPHONE ENCOUNTER
Adderall 15 mg       Last Written Prescription Date:  04/13/2020  Last Fill Quantity: 30,   # refills: 0  Last Office Visit:07/15/2020  Future Office visit:       Routing refill request to provider for review/approval because:  Drug not on the [facility], [facility] or [facility] refill protocol or controlled substance    Adderall 20 mg       Last Written Prescription Date:  04/13/2020  Last Fill Quantity: 30,   # refills: 0  Last Office Visit: 07/15/2020  Future Office visit:       Routing refill request to provider for review/approval because:  Drug not on the [facility], [facility] or [facility] refill protocol or controlled substance

## 2020-06-18 ENCOUNTER — TELEPHONE (OUTPATIENT)
Dept: FAMILY MEDICINE | Facility: CLINIC | Age: 30
End: 2020-06-18

## 2020-06-18 NOTE — TELEPHONE ENCOUNTER
Spoke with pharmacy, they had the wrong FLAK[employer] number, corrected and patient had already picked up the RX.    Aparna Hayden XRO/

## 2020-06-18 NOTE — TELEPHONE ENCOUNTER
" FYI :  Patients  wife Jamee called [facility] pharmacy about issues they are having with [facility] pharmacy. She states they are refusing to fill pt adderall scripts, apparently Dr Bah does not \" prescriptive rights \" She wants  to call [facility] to figure out?  Told her that I would put in a message but typically that would be the Pharmacy that is filling the medications job to call provider .     I dont think Patient nor his Wife are understanding what is actually going on   "

## 2020-06-18 NOTE — TELEPHONE ENCOUNTER
Is this a restricted patient?    I checked the date on my medical license and it appears to be all in order.  The FLAKITA license also looks fine and functional.    Unless this is a restricted patient, the pharmacy would seem to have a problem that needs to be rectified on their end  .  If this is a restricted patient, there is probably nothing I can do.    Thank you very much.    Niurka

## 2020-08-11 ENCOUNTER — MYC REFILL (OUTPATIENT)
Dept: FAMILY MEDICINE | Facility: CLINIC | Age: 30
End: 2020-08-11

## 2020-08-11 DIAGNOSIS — F90.0 ATTENTION DEFICIT HYPERACTIVITY DISORDER (ADHD), PREDOMINANTLY INATTENTIVE TYPE: ICD-10-CM

## 2020-08-11 NOTE — LETTER
[address] [address]  Phone: [phone]  Fax: [phone]        August 13, 2020      Baldemar SALAMANCA   [facility] [address]            Dear Mr. Johnson,    We are concerned about your health care.  We recently provided you with a medication refill.  Many medications require routine follow-up with your Doctor.      At this time we ask that: You schedule a routine office visit with your physician to follow your care.     Your prescription: Has been refilled for 1 month so you may have time for the above noted follow-up.      Thank you,      [facility]

## 2020-08-11 NOTE — TELEPHONE ENCOUNTER
Requested Prescriptions   Pending Prescriptions Disp Refills     amphetamine-dextroamphetamine (ADDERALL XR) 20 MG 24 hr capsule 30 capsule 0     Sig: Take 1 capsule (20 mg) by mouth daily     Last Written Prescription Date:  07/13/2020  Last Fill Quantity: 30,   # refills: 0  Last Office Visit: 12/02/2019  Future Office visit:       Routing refill request to provider for review/approval because:  Drug not on the [facility], [facility] or  [facility] refill protocol or controlled substance              amphetamine-dextroamphetamine (ADDERALL) 15 MG tablet 30 tablet 0     Sig: Take 1 capsule by mouth daily in the early afternoon     Last Written Prescription Date:  07/13/2020  Last Fill Quantity: 30,   # refills: 0  Last Office Visit: 12/02/2019  Future Office visit:       Routing refill request to provider for review/approval because:  Drug not on the [facility], [facility] or  [facility] refill protocol or controlled substance    Joanna Myles MA

## 2020-08-12 RX ORDER — DEXTROAMPHETAMINE SACCHARATE, AMPHETAMINE ASPARTATE MONOHYDRATE, DEXTROAMPHETAMINE SULFATE AND AMPHETAMINE SULFATE 5; 5; 5; 5 MG/1; MG/1; MG/1; MG/1
20 CAPSULE, EXTENDED RELEASE ORAL DAILY
Qty: 30 CAPSULE | Refills: 0 | Status: SHIPPED | OUTPATIENT
Start: 2020-08-12 | End: 2021-02-03

## 2020-08-12 RX ORDER — DEXTROAMPHETAMINE SACCHARATE, AMPHETAMINE ASPARTATE, DEXTROAMPHETAMINE SULFATE AND AMPHETAMINE SULFATE 3.75; 3.75; 3.75; 3.75 MG/1; MG/1; MG/1; MG/1
TABLET ORAL
Qty: 30 TABLET | Refills: 0 | Status: SHIPPED | OUTPATIENT
Start: 2020-08-12 | End: 2021-02-03

## 2020-08-12 NOTE — TELEPHONE ENCOUNTER
Patient will need a face-to-face office visit prior to any subsequent refills of his medication.    Niurka

## 2020-08-13 NOTE — TELEPHONE ENCOUNTER
2nd attempt unable to reach patient, line just kept ringing. Letter sent to patient.   Joanan Myles MA

## 2020-12-13 ENCOUNTER — HEALTH MAINTENANCE LETTER (OUTPATIENT)
Age: 30
End: 2020-12-13

## 2021-01-15 ENCOUNTER — HEALTH MAINTENANCE LETTER (OUTPATIENT)
Age: 31
End: 2021-01-15

## 2021-02-03 ENCOUNTER — OFFICE VISIT (OUTPATIENT)
Dept: FAMILY MEDICINE | Facility: CLINIC | Age: 31
End: 2021-02-03
Payer: COMMERCIAL

## 2021-02-03 VITALS
SYSTOLIC BLOOD PRESSURE: 132 MMHG | BODY MASS INDEX: 25.24 KG/M2 | DIASTOLIC BLOOD PRESSURE: 64 MMHG | HEART RATE: 70 BPM | WEIGHT: 203 LBS | TEMPERATURE: 97.8 F | HEIGHT: 75 IN | RESPIRATION RATE: 12 BRPM

## 2021-02-03 DIAGNOSIS — F33.1 MODERATE EPISODE OF RECURRENT MAJOR DEPRESSIVE DISORDER (H): ICD-10-CM

## 2021-02-03 DIAGNOSIS — L30.9 HAND DERMATITIS: Primary | ICD-10-CM

## 2021-02-03 DIAGNOSIS — Z71.6 ENCOUNTER FOR TOBACCO USE CESSATION COUNSELING: ICD-10-CM

## 2021-02-03 DIAGNOSIS — F90.0 ATTENTION DEFICIT HYPERACTIVITY DISORDER (ADHD), PREDOMINANTLY INATTENTIVE TYPE: ICD-10-CM

## 2021-02-03 PROCEDURE — 99214 OFFICE O/P EST MOD 30 MIN: CPT | Performed by: FAMILY MEDICINE

## 2021-02-03 PROCEDURE — 96127 BRIEF EMOTIONAL/BEHAV ASSMT: CPT | Performed by: FAMILY MEDICINE

## 2021-02-03 RX ORDER — TRIAMCINOLONE ACETONIDE 1 MG/G
OINTMENT TOPICAL
Qty: 15 G | Refills: 1 | Status: SHIPPED | OUTPATIENT
Start: 2021-02-03 | End: 2022-08-15

## 2021-02-03 RX ORDER — DEXTROAMPHETAMINE SACCHARATE, AMPHETAMINE ASPARTATE, DEXTROAMPHETAMINE SULFATE AND AMPHETAMINE SULFATE 3.75; 3.75; 3.75; 3.75 MG/1; MG/1; MG/1; MG/1
TABLET ORAL
Qty: 30 TABLET | Refills: 0 | Status: SHIPPED | OUTPATIENT
Start: 2021-02-03 | End: 2021-03-03

## 2021-02-03 RX ORDER — DEXTROAMPHETAMINE SACCHARATE, AMPHETAMINE ASPARTATE MONOHYDRATE, DEXTROAMPHETAMINE SULFATE AND AMPHETAMINE SULFATE 5; 5; 5; 5 MG/1; MG/1; MG/1; MG/1
20 CAPSULE, EXTENDED RELEASE ORAL DAILY
Qty: 30 CAPSULE | Refills: 0 | Status: SHIPPED | OUTPATIENT
Start: 2021-02-03 | End: 2021-03-03

## 2021-02-03 ASSESSMENT — PATIENT HEALTH QUESTIONNAIRE - PHQ9
SUM OF ALL RESPONSES TO PHQ QUESTIONS 1-9: 13
SUM OF ALL RESPONSES TO PHQ QUESTIONS 1-9: 13
10. IF YOU CHECKED OFF ANY PROBLEMS, HOW DIFFICULT HAVE THESE PROBLEMS MADE IT FOR YOU TO DO YOUR WORK, TAKE CARE OF THINGS AT HOME, OR GET ALONG WITH OTHER PEOPLE: SOMEWHAT DIFFICULT

## 2021-02-03 ASSESSMENT — PAIN SCALES - GENERAL: PAINLEVEL: EXTREME PAIN (8)

## 2021-02-03 ASSESSMENT — MIFFLIN-ST. JEOR: SCORE: 1963.93

## 2021-02-03 NOTE — PROGRESS NOTES
{2021 PROVIDER CHARTING PREFERENCE:703047}    Subjective     Baldemar Johnson is a 31 year old male who presents to clinic today for the following health issues accompanied by his {accompanied to visit:785131}:    HPI     Check rash on hands-wondering if reaction to gloves he wears at work***    SUBJECTIVE:  Patient is here today to recheck ADHD/ADD.    Updates since last visit: not well  Routine for taking medicine, including time: n/a  Time medicine wears off: n/a  Issues at school/Work: hard to focus  Issues at home: hard to focus  Control of symptoms: not controlled and hard to function with out it but when on it does well    Side effects:  Headaches: n/a  Stomach aches: n/a  Irritability/mood swings: n/a  Difficulties with sleep: n/a  Social withdrawal: n/a  Unusual movements/tics: n/a  Decreased appetite: n/a    Other concerns: getting back on meds      Depression Followup    How are you doing with your depression since your last visit? Not sure    Are you having other symptoms that might be associated with depression? Yes:  failure to family, works a lot, child is taken away     Have you had a significant life event?  No     Are you feeling anxious or having panic attacks?   No    Do you have any concerns with your use of alcohol or other drugs? No    Social History     Tobacco Use     Smoking status: Current Every Day Smoker     Packs/day: 0.50     Years: 10.00     Pack years: 5.00     Types: Cigarettes     Start date: 7/12/2006     Smokeless tobacco: Never Used   Substance Use Topics     Alcohol use: Yes     Comment: Very rare occasion     Drug use: No     PHQ 7/15/2019 2/2/2021 2/3/2021   PHQ-9 Total Score 4 15 13   Q9: Thoughts of better off dead/self-harm past 2 weeks Not at all Not at all Not at all     KALANI-7 SCORE 4/19/2019 7/15/2019 2/2/2021   Total Score - - 6 (mild anxiety)   Total Score 9 4 6     Last PHQ-9 2/3/2021   1.  Little interest or pleasure in doing things 1   2.  Feeling down, depressed, or  hopeless 0   3.  Trouble falling or staying asleep, or sleeping too much 3   4.  Feeling tired or having little energy 1   5.  Poor appetite or overeating 0   6.  Feeling bad about yourself 3   7.  Trouble concentrating 3   8.  Moving slowly or restless 2   Q9: Thoughts of better off dead/self-harm past 2 weeks 0   PHQ-9 Total Score 13   Difficulty at work, home, or with people -     KALANI-7  2/2/2021   1. Feeling nervous, anxious, or on edge 1   2. Not being able to stop or control worrying 0   3. Worrying too much about different things 1   4. Trouble relaxing 1   5. Being so restless that it is hard to sit still 1   6. Becoming easily annoyed or irritable 2   7. Feeling afraid, as if something awful might happen 0   KALANI-7 Total Score 6   If you checked any problems, how difficult have they made it for you to do your work, take care of things at home, or get along with other people? -     {PROVIDER ONLY Complete follow-up questions for patients who report suicide ideation  (Optional):743150}    Suicide Assessment Five-step Evaluation and Treatment (SAFE-T)      How many servings of fruits and vegetables do you eat daily?  2-3    On average, how many sweetened beverages do you drink each day (Examples: soda, juice, sweet tea, etc.  Do NOT count diet or artificially sweetened beverages)?   5    How many days per week do you exercise enough to make your heart beat faster? 5 at work only    How many minutes a day do you exercise enough to make your heart beat faster? 60 or more  How many days per week do you miss taking your medication? Daily right now    What makes it hard for you to take your medications?  ran out of meds    {additonal problems for provider to add (Optional):740974}    Review of Systems   {ROS COMP (Optional):839137}      Objective    There were no vitals taken for this visit.  There is no height or weight on file to calculate BMI.  Physical Exam   {Exam List (Optional):576818}    {Diagnostic Test  Results (Optional):871277}      Answers for HPI/ROS submitted by the patient on 2/3/2021   If you checked off any problems, how difficult have these problems made it for you to do your work, take care of things at home, or get along with other people?: Somewhat difficult  PHQ9 TOTAL SCORE: 13

## 2021-02-03 NOTE — PATIENT INSTRUCTIONS
Patient Education     What Is ADHD?  Does your child have trouble sitting still or paying attention? You may have been told that ADHD (attention deficit hyperactivity disorder) may be the cause. A child with ADHD might have a hard time staying focused (attention deficit). He or she may also have trouble controlling impulses (hyperactivity disorder). A child with one or both of these problems struggles daily to perform and behave well. ADHD is no one s fault. But if left untreated, it can deprive a child of self-esteem, curb new relationships, and limit success.     Which of the following describe your child?  These are some of the symptoms of ADHD:  Attention deficit    Lacks mental focus    Performs inconsistently    Is distracted easily    Has trouble shifting between tasks or settings    Is messy, or loses things    Forgets    Hyperactive/impulsive    Has trouble controlling impulses (might talk too much, interrupt, or have a hard time taking turns)    Is easy to upset or anger    Is always moving (sometimes without purpose)    Does not learn from mistakes    What happens in the brain?  The brain controls your body, thoughts, and feelings. It does so with the help of neurotransmitters. These chemicals help the brain send and receive messages. With ADHD, the level of these chemicals often varies. This may cause signs of ADHD to come and go.   When messages are not received  With ADHD, chemicals in certain parts of the brain can be in short supply. Because of this, some messages do not travel between nerve cells. Messages that signal a person to control behavior or pay attention aren t passed along. As a result, traits common to ADHD may occur.   Remember your child s strengths  Children with ADHD can be challenging to raise. Because of this, it s easy to overlook their good traits. What s special about your child? Do your best to value and support your child s unique talents, strengths, and interests. To nurture  and support your child's self-esteem, share your positive thoughts and feelings with your child as often as possible.   [employer] last reviewed this educational content on 4/1/2020 2000-2020 [employer]. [address] [place] [address], [place] [address]. All rights reserved. This information is not intended as a substitute for professional medical care. Always follow your healthcare professional's instructions.

## 2021-02-03 NOTE — PROGRESS NOTES
Assessment & Plan    1. Hand dermatitis: Likely contact dermatitis.  Will increase potency to Kenalog cream.  Also recommended wearing cotton wicking gloves as well as the plastic gloves over these while working.  Follow-up if not proving.  Patient agreeable plan.  - triamcinolone (KENALOG) 0.1 % external ointment; Apply to affected area twice daily for no more than 2 weeks out of the month, or as directed by your doctor. Do not apply to face, groin, or armpits.  Dispense: 15 g; Refill: 1    2. Attention deficit hyperactivity disorder (ADHD), predominantly inattentive type: Reviewed .  U tox sent.  Started on previous dosing for Adderall to help with concentration and function at work.  Should follow-up within 1 month.  If effective and no side effects at that time may continue with every 6-month follow-up.  - amphetamine-dextroamphetamine (ADDERALL XR) 20 MG 24 hr capsule; Take 1 capsule (20 mg) by mouth daily  Dispense: 30 capsule; Refill: 0  - amphetamine-dextroamphetamine (ADDERALL) 15 MG tablet; Take 1 capsule by mouth daily in the early afternoon  Dispense: 30 tablet; Refill: 0  - EFR0625 - Pain Drug Screen, Urine, with reported meds (MedTox)    3. Moderate episode of recurrent major depressive disorder (H): KALANI-7 PHQ-9 filled out today.  Patient does have depressed mood but not interested in discussing further or taking medications.  He would be interested in trialing therapy, but is not optimistic that this will help.  Encourage exercise and good sleep.  - MENTAL HEALTH REFERRAL  - Adult; Outpatient Treatment; Individual/Couples/Family/Group Therapy/Health Psychology; [facility] [place]: [facility] [phone]; We will contact you to schedule the appointment or please call with any questions    4. Encounter for tobacco use cessation counseling: Patient is not interested in quitting at this time.  He states he has thought about it but it is way to help him cope.  He will consider think about this in  the future.    Tobacco Cessation:   reports that he has been smoking cigarettes. He started smoking about 14 years ago. He has a 5.00 pack-year smoking history. He has never used smokeless tobacco.  Tobacco Cessation Action Plan: Information offered: Patient not interested at this time    Return in about 4 weeks (around 3/3/2021) for ADHD .    Tanner Ferro MD  [facility]    This chart is completed utilizing dictation software; typos and/or incorrect word substitutions may unintentionally occur.    Subjective     Baldemar Johnson is a 31 year old male who presents to clinic today for the following health issues    HPI   Patient is here with multiple concerns today.  Firstly he works at [employer] and wears latex or other rubber gloves.  He attributes this to causing a rash on his hands.  After work he can continues to get red irritated skin and often burns.  He does not use a significant amount of soap or hand  is at work.  Work does have other plastic gauze available that he could use.  He has not had issues with this previously.  Is wondering what else he can do.  Has tried some topical steroid treatment with minimal effect.    Additionally he wishes to get back on his ADHD medication.  Previously being treated with extended-release Adderall in the morning 1 mg and 50 mg in the afternoon of short acting Adderall.  He believes this was quite helpful.    Additionally his mood is a factor as well.  He believes this mainly stems from past emotional traumas including having his child taken away by CPS and was diagnosed with cerebral palsy.  Is not interested in medication at this time but would be interested in therapy.  He states this was not particularly helpful in the past in the past was numbers particularly helpful.             Patient is here today to recheck ADHD/ADD.    Updates since last visit: not well  Routine for taking medicine, including time: n/a  Time medicine  "wears off: n/a  Issues at school/Work: hard to focus  Issues at home: hard to focus  Control of symptoms: not controlled and hard to function with out it but when on it does well    Side effects:  Headaches: n/a  Stomach aches: n/a  Irritability/mood swings: n/a  Difficulties with sleep: n/a  Social withdrawal: n/a  Unusual movements/tics: n/a  Decreased appetite: n/a    Other concerns: getting back on meds    Depression Followup    How are you doing with your depression since your last visit? Not sure    Are you having other symptoms that might be associated with depression? Yes:  failure to family, works a lot, child is taken away     Have you had a significant life event?  No     Are you feeling anxious or having panic attacks?   No    Do you have any concerns with your use of alcohol or other drugs? No    Review of Systems   Constitutional, HEENT, psych, neuro, cardiovascular, pulmonary, gi and gu systems are negative, except as otherwise noted.      Objective    /64   Pulse 70   Temp 97.8  F (36.6  C) (Temporal)   Resp 12   Ht 1.893 m (6' 2.53\")   Wt 92.1 kg (203 lb)   BMI 25.70 kg/m    Body mass index is 25.7 kg/m .  Physical Exam   General: Appears well and in no acute distress.  Cardiovascular: Regular rate and rhythm, normal S1 and S2 without murmur. No extra heartsounds or friction rub. Radial pulses present and equal bilaterally.  Respiratory: Lungs clear to auscultation bilaterally. No wheezing or crackles. No prolonged expiration. Symmetrical chest rise.  Musculoskeletal: No gross extremity deformities. No peripheral edema. Normal muscle bulk.  Derm: Patches of erythema on the radial aspect of his wrists and thumbs bilaterally. No suspicious lesions or rashes over exposed surfaces.    Labs pending      Answers for HPI/ROS submitted by the patient on 2/3/2021   If you checked off any problems, how difficult have these problems made it for you to do your work, take care of things at home, or " get along with other people?: Somewhat difficult  PHQ9 TOTAL SCORE: 13

## 2021-02-04 ENCOUNTER — APPOINTMENT (OUTPATIENT)
Dept: LAB | Facility: CLINIC | Age: 31
End: 2021-02-04
Payer: COMMERCIAL

## 2021-02-04 PROCEDURE — 80307 DRUG TEST PRSMV CHEM ANLYZR: CPT | Mod: 90 | Performed by: FAMILY MEDICINE

## 2021-02-04 PROCEDURE — 99000 SPECIMEN HANDLING OFFICE-LAB: CPT | Performed by: FAMILY MEDICINE

## 2021-02-11 LAB — PAIN DRUG SCR UR W RPTD MEDS: NORMAL

## 2021-03-01 NOTE — PROGRESS NOTES
{PROVIDER CHARTING PREFERENCE:829923}    Subjective   Vernon is a 31 year old who presents for the following health issues {ACCOMPANIED BY STATEMENT (Optional):754406}    HPI       {SUPERLIST (Optional):319536}    {additonal problems for provider to add (Optional):575381}    Review of Systems   {ROS COMP (Optional):544200}      Objective    There were no vitals taken for this visit.  There is no height or weight on file to calculate BMI.  Physical Exam   {Exam List (Optional):390493}    {Diagnostic Test Results (Optional):712639}    {AMBULATORY ATTESTATION (Optional):275409}

## 2021-03-03 ENCOUNTER — VIRTUAL VISIT (OUTPATIENT)
Dept: FAMILY MEDICINE | Facility: CLINIC | Age: 31
End: 2021-03-03
Payer: COMMERCIAL

## 2021-03-03 DIAGNOSIS — Z59.00 HOMELESS: ICD-10-CM

## 2021-03-03 DIAGNOSIS — F90.0 ATTENTION DEFICIT HYPERACTIVITY DISORDER (ADHD), PREDOMINANTLY INATTENTIVE TYPE: Primary | ICD-10-CM

## 2021-03-03 PROCEDURE — 99214 OFFICE O/P EST MOD 30 MIN: CPT | Mod: 95 | Performed by: FAMILY MEDICINE

## 2021-03-03 RX ORDER — DEXTROAMPHETAMINE SACCHARATE, AMPHETAMINE ASPARTATE MONOHYDRATE, DEXTROAMPHETAMINE SULFATE AND AMPHETAMINE SULFATE 5; 5; 5; 5 MG/1; MG/1; MG/1; MG/1
20 CAPSULE, EXTENDED RELEASE ORAL DAILY
Qty: 30 CAPSULE | Refills: 0 | Status: SHIPPED | OUTPATIENT
Start: 2021-04-02 | End: 2021-03-03

## 2021-03-03 RX ORDER — DEXTROAMPHETAMINE SACCHARATE, AMPHETAMINE ASPARTATE MONOHYDRATE, DEXTROAMPHETAMINE SULFATE AND AMPHETAMINE SULFATE 5; 5; 5; 5 MG/1; MG/1; MG/1; MG/1
20 CAPSULE, EXTENDED RELEASE ORAL DAILY
Qty: 30 CAPSULE | Refills: 0 | Status: SHIPPED | OUTPATIENT
Start: 2021-03-03 | End: 2021-03-03

## 2021-03-03 RX ORDER — DEXTROAMPHETAMINE SACCHARATE, AMPHETAMINE ASPARTATE, DEXTROAMPHETAMINE SULFATE AND AMPHETAMINE SULFATE 3.75; 3.75; 3.75; 3.75 MG/1; MG/1; MG/1; MG/1
TABLET ORAL
Qty: 30 TABLET | Refills: 0 | Status: SHIPPED | OUTPATIENT
Start: 2021-05-02 | End: 2021-06-07

## 2021-03-03 RX ORDER — DEXTROAMPHETAMINE SACCHARATE, AMPHETAMINE ASPARTATE MONOHYDRATE, DEXTROAMPHETAMINE SULFATE AND AMPHETAMINE SULFATE 5; 5; 5; 5 MG/1; MG/1; MG/1; MG/1
20 CAPSULE, EXTENDED RELEASE ORAL DAILY
Qty: 30 CAPSULE | Refills: 0 | Status: SHIPPED | OUTPATIENT
Start: 2021-05-02 | End: 2021-06-07

## 2021-03-03 RX ORDER — DEXTROAMPHETAMINE SACCHARATE, AMPHETAMINE ASPARTATE, DEXTROAMPHETAMINE SULFATE AND AMPHETAMINE SULFATE 3.75; 3.75; 3.75; 3.75 MG/1; MG/1; MG/1; MG/1
TABLET ORAL
Qty: 30 TABLET | Refills: 0 | Status: SHIPPED | OUTPATIENT
Start: 2021-03-03 | End: 2021-03-03

## 2021-03-03 RX ORDER — DEXTROAMPHETAMINE SACCHARATE, AMPHETAMINE ASPARTATE, DEXTROAMPHETAMINE SULFATE AND AMPHETAMINE SULFATE 3.75; 3.75; 3.75; 3.75 MG/1; MG/1; MG/1; MG/1
TABLET ORAL
Qty: 30 TABLET | Refills: 0 | Status: SHIPPED | OUTPATIENT
Start: 2021-04-02 | End: 2021-03-03

## 2021-03-03 SDOH — ECONOMIC STABILITY - HOUSING INSECURITY: HOMELESSNESS UNSPECIFIED: Z59.00

## 2021-03-03 NOTE — PROGRESS NOTES
"Vernon is a 31 year old who is being evaluated via a billable telephone visit.      What phone number would you like to be contacted at? [phone]  How would you like to obtain your AVS? Maricarmen    Assessment & Plan   1. Attention deficit hyperactivity disorder (ADHD), predominantly inattentive type: Patient states current dose is working well.  Minimal side effects.  He is watching his weight but does not have any recent weights to report.  Recommend follow-up in 3 months given good response.  Should be in person to check weight at that time.  Patient agreeable plan.   reviewed.  - amphetamine-dextroamphetamine (ADDERALL XR) 20 MG 24 hr capsule; Take 1 capsule (20 mg) by mouth daily  Dispense: 30 capsule; Refill: 0  - amphetamine-dextroamphetamine (ADDERALL) 15 MG tablet; Take 1 capsule by mouth daily in the early afternoon  Dispense: 30 tablet; Refill: 0    2. Homeless: Declines financial services at this time.    Return in about 3 months (around 6/3/2021) for ADHD follow-up.    Tanner Ferro MD  [facility]    This chart is completed utilizing dictation software; typos and/or incorrect word substitutions may unintentionally occur.    Subjective   Vernon is a 31 year old who presents for the following health issues    HPI      Medication Followup of  ADDERALL     Taking Medication as prescribed: Yes. Patient is a manager at [employer]. Patient stated it helps him focus at work.     Side Effects:  \"Empty stomach feeling.\"     Medication Helping Symptoms:  YES.     Patient is here to follow-up on his ADHD.  Adderall in his youth; however, and got off this for period of time.  This was restarted during my last visit with him on 2/3/2021.  He denies any significant side effects from the medication other than perhaps some mild appetite suppression.  He still forces himself to eat however.  He is not noticed any significant weight loss.  He denies any side effect such as " headache, palpitations, worsening anxiety, etc.  He believes he is functioning well with the current dose of medication.    He does endorse homelessness and is not interested in  services at this time.    He has no other questions or concerns today.    Review of Systems   Constitutional, neuro, psych, ENT, cardiovascular, pulmonary, gi  systems are negative, except as otherwise noted.      Objective       Vitals:  No vitals were obtained today due to virtual visit.    Physical Exam   healthy, alert and no distress  PSYCH: Alert and oriented times 3; coherent speech, normal   rate and volume, able to articulate logical thoughts, able   to abstract reason, no tangential thoughts, no hallucinations   or delusions  His affect is normal  RESP: No cough, no audible wheezing, able to talk in full sentences  Remainder of exam unable to be completed due to telephone visits    Labs: none        Phone call duration: 6 minutes

## 2021-03-03 NOTE — PATIENT INSTRUCTIONS
Patient Education     Treating Attention-Deficit/Hyperactivity Disorder (ADHD) in Adults  Attention-deficit/hyperactivity disorder (ADHD) starts in childhood. It may continue throughout your life. When it does, it may affect your job and even your relationships. But with help, you can manage ADHD.      Treatment for ADD can help you achieve your goals.   Therapy  Your therapist can help you learn healthy ways to cope with ADHD. Sometimes, your partner or family may attend your sessions with you. This helps them understand more about your disorder and give you additional support.   Coaching  An ADHD  works with you to achieve your goals. You ll learn the best ways to manage your time. You ll also learn to avoid clutter and noise that may distract you. In time, your life will have more order and structure. And your  will provide support and feedback on your progress.   Work  Look for jobs where you can be free and creative. Stay away from those that are dull or centered on details. You may still need to make a special effort. These tips may help:     Try to work at home, at least part time.    Ask for a private office.    Use headphones to muffle noise.    Work on more than one project at the same time. When you get bored with one, switch to the other.    Work on boring tasks when you feel most alert.    Have a schedule for each day and make every effort to stick to it.    Ask your  or  to help with details.    Use a day planner and to-do lists. Write yourself notes or set up reminder alerts on your electronic devices.    Reward yourself when you finish a task.  Medicines  In most cases, medicines can help control symptoms of ADHD. Most often, you'll use medicine along with therapy, coaching, and lifestyle changes. Your healthcare provider may prescribe a stimulant to help you stay focused. Or you may take a type of antidepressant. It may take some time to find what works best for  you. Keep in mind that medicines don t cure ADHD. And they may cause side effects such as headaches, trouble sleeping, or stomach problems. Take your medicine as prescribed. If you re bothered by side effects, be sure to tell your healthcare provider. Always talk with your healthcare provider before making any changes to your medicine.   [employer] last reviewed this educational content on 5/1/2020 2000-2020 [employer]. All rights reserved. This information is not intended as a substitute for professional medical care. Always follow your healthcare professional's instructions.

## 2021-06-07 ENCOUNTER — MYC REFILL (OUTPATIENT)
Dept: FAMILY MEDICINE | Facility: CLINIC | Age: 31
End: 2021-06-07

## 2021-06-07 DIAGNOSIS — F90.0 ATTENTION DEFICIT HYPERACTIVITY DISORDER (ADHD), PREDOMINANTLY INATTENTIVE TYPE: ICD-10-CM

## 2021-06-07 RX ORDER — DEXTROAMPHETAMINE SACCHARATE, AMPHETAMINE ASPARTATE, DEXTROAMPHETAMINE SULFATE AND AMPHETAMINE SULFATE 3.75; 3.75; 3.75; 3.75 MG/1; MG/1; MG/1; MG/1
TABLET ORAL
Qty: 30 TABLET | Refills: 0 | Status: SHIPPED | OUTPATIENT
Start: 2021-06-07 | End: 2021-07-07

## 2021-06-07 RX ORDER — DEXTROAMPHETAMINE SACCHARATE, AMPHETAMINE ASPARTATE MONOHYDRATE, DEXTROAMPHETAMINE SULFATE AND AMPHETAMINE SULFATE 5; 5; 5; 5 MG/1; MG/1; MG/1; MG/1
20 CAPSULE, EXTENDED RELEASE ORAL DAILY
Qty: 30 CAPSULE | Refills: 0 | Status: SHIPPED | OUTPATIENT
Start: 2021-06-07 | End: 2021-07-07

## 2021-06-07 NOTE — TELEPHONE ENCOUNTER
Pending Prescriptions:                       Disp   Refills    amphetamine-dextroamphetamine (ADDERALL XR*30 cap*0        Sig: Take 1 capsule (20 mg) by mouth daily    amphetamine-dextroamphetamine (ADDERALL) 1*30 tab*0        Sig: Take 1 capsule by mouth daily in the early afternoon    Routing refill request to provider for review/approval because:  Drug not on the [facility] refill protocol

## 2021-07-07 ENCOUNTER — OFFICE VISIT (OUTPATIENT)
Dept: FAMILY MEDICINE | Facility: CLINIC | Age: 31
End: 2021-07-07
Payer: COMMERCIAL

## 2021-07-07 VITALS
WEIGHT: 202.1 LBS | OXYGEN SATURATION: 99 % | BODY MASS INDEX: 25.13 KG/M2 | HEART RATE: 110 BPM | DIASTOLIC BLOOD PRESSURE: 64 MMHG | TEMPERATURE: 98.5 F | RESPIRATION RATE: 18 BRPM | SYSTOLIC BLOOD PRESSURE: 126 MMHG | HEIGHT: 75 IN

## 2021-07-07 DIAGNOSIS — F41.9 ANXIETY: ICD-10-CM

## 2021-07-07 DIAGNOSIS — F90.0 ATTENTION DEFICIT HYPERACTIVITY DISORDER (ADHD), PREDOMINANTLY INATTENTIVE TYPE: Primary | ICD-10-CM

## 2021-07-07 DIAGNOSIS — Z79.899 ENCOUNTER FOR LONG-TERM (CURRENT) USE OF MEDICATIONS: ICD-10-CM

## 2021-07-07 PROCEDURE — 96127 BRIEF EMOTIONAL/BEHAV ASSMT: CPT | Performed by: FAMILY MEDICINE

## 2021-07-07 PROCEDURE — 80307 DRUG TEST PRSMV CHEM ANLYZR: CPT | Performed by: FAMILY MEDICINE

## 2021-07-07 PROCEDURE — 99214 OFFICE O/P EST MOD 30 MIN: CPT | Performed by: FAMILY MEDICINE

## 2021-07-07 RX ORDER — VENLAFAXINE HYDROCHLORIDE 37.5 MG/1
37.5 CAPSULE, EXTENDED RELEASE ORAL DAILY
Qty: 7 CAPSULE | Refills: 0 | Status: SHIPPED | OUTPATIENT
Start: 2021-07-07 | End: 2021-10-06

## 2021-07-07 RX ORDER — DEXTROAMPHETAMINE SACCHARATE, AMPHETAMINE ASPARTATE, DEXTROAMPHETAMINE SULFATE AND AMPHETAMINE SULFATE 3.75; 3.75; 3.75; 3.75 MG/1; MG/1; MG/1; MG/1
TABLET ORAL
Qty: 30 TABLET | Refills: 0 | Status: SHIPPED | OUTPATIENT
Start: 2021-07-07 | End: 2021-07-30

## 2021-07-07 RX ORDER — VENLAFAXINE HYDROCHLORIDE 75 MG/1
75 CAPSULE, EXTENDED RELEASE ORAL DAILY
Qty: 30 CAPSULE | Refills: 1 | Status: SHIPPED | OUTPATIENT
Start: 2021-07-07 | End: 2021-10-06

## 2021-07-07 RX ORDER — DEXTROAMPHETAMINE SACCHARATE, AMPHETAMINE ASPARTATE MONOHYDRATE, DEXTROAMPHETAMINE SULFATE AND AMPHETAMINE SULFATE 5; 5; 5; 5 MG/1; MG/1; MG/1; MG/1
20 CAPSULE, EXTENDED RELEASE ORAL DAILY
Qty: 30 CAPSULE | Refills: 0 | Status: SHIPPED | OUTPATIENT
Start: 2021-07-07 | End: 2021-07-30

## 2021-07-07 ASSESSMENT — ANXIETY QUESTIONNAIRES
3. WORRYING TOO MUCH ABOUT DIFFERENT THINGS: SEVERAL DAYS
2. NOT BEING ABLE TO STOP OR CONTROL WORRYING: NOT AT ALL
GAD7 TOTAL SCORE: 7
7. FEELING AFRAID AS IF SOMETHING AWFUL MIGHT HAPPEN: NOT AT ALL
7. FEELING AFRAID AS IF SOMETHING AWFUL MIGHT HAPPEN: NOT AT ALL
4. TROUBLE RELAXING: SEVERAL DAYS
6. BECOMING EASILY ANNOYED OR IRRITABLE: MORE THAN HALF THE DAYS
1. FEELING NERVOUS, ANXIOUS, OR ON EDGE: MORE THAN HALF THE DAYS
5. BEING SO RESTLESS THAT IT IS HARD TO SIT STILL: SEVERAL DAYS

## 2021-07-07 ASSESSMENT — PATIENT HEALTH QUESTIONNAIRE - PHQ9
SUM OF ALL RESPONSES TO PHQ QUESTIONS 1-9: 13
10. IF YOU CHECKED OFF ANY PROBLEMS, HOW DIFFICULT HAVE THESE PROBLEMS MADE IT FOR YOU TO DO YOUR WORK, TAKE CARE OF THINGS AT HOME, OR GET ALONG WITH OTHER PEOPLE: VERY DIFFICULT
SUM OF ALL RESPONSES TO PHQ QUESTIONS 1-9: 13

## 2021-07-07 ASSESSMENT — MIFFLIN-ST. JEOR: SCORE: 1959.88

## 2021-07-07 NOTE — PATIENT INSTRUCTIONS
Patient Education     Effexor Oral Tablet 75 mg  Uses  This medicine is used for the following purposes:    anxiety    depression    menopausal symptoms    muscle weakness    post-traumatic stress disorder  Instructions  Take the medicine with food.  It is very important that you take the medicine at about the same time every day. It will work best if you do this.  Keep the medicine at room temperature. Avoid heat and direct light.  It may take several weeks for this medicine to fully work.  It is important that you keep taking each dose of this medicine on time even if you are feeling well.  If you forget to take a dose on time, take it as soon as you remember. If it is almost time for the next dose, do not take the missed dose. Return to your normal dosing schedule. Do not take 2 doses of this medicine at one time.  Please tell your doctor and pharmacist about all the medicines you take. Include both prescription and over-the-counter medicines. Also tell them about any vitamins, herbal medicines, or anything else you take for your health.  Do not suddenly stop taking this medicine. Check with your doctor before stopping.  Cautions  Tell your doctor and pharmacist if you ever had an allergic reaction to a medicine. Symptoms of an allergic reaction can include trouble breathing, skin rash, itching, swelling, or severe dizziness.  Do not use the medication any more than instructed.  Your ability to stay alert or to react quickly may be impaired by this medicine. Do not drive or operate machinery until you know how this medicine will affect you.  Do not drink beverages with alcohol while on this medicine.  Family should check on the patient often. Call the doctor if patient becomes more depressed, has thoughts of suicide, or shows changes in behavior.  Call the doctor if there are any signs of confusion or unusual changes in behavior.  Tell the doctor or pharmacist if you are pregnant, planning to be pregnant, or  breastfeeding.  Ask your pharmacist if this medicine can interact with any of your other medicines. Be sure to tell them about all the medicines you take.  Do not start or stop any other medicines without first speaking to your doctor or pharmacist.  Call your doctor right away if you notice any unusual bleeding or bruising.  Do not share this medicine with anyone who has not been prescribed this medicine.  This medicine can cause serious side effects in some patients. Important information from the U.S. Food and Drug Administration (FDA) is available from your pharmacist. Please review it carefully with your pharmacist to understand the risks associated with this medicine.  Side Effects  The following is a list of some common side effects from this medicine. Please speak with your doctor about what you should do if you experience these or other side effects.    agitated feeling or trouble sleeping    decreased appetite    constipation    dizziness    drowsiness or sedation    dry mouth    high blood pressure    nausea    nervousness    sweating  Call your doctor or get medical help right away if you notice any of these more serious side effects:    bleeding that is severe or takes longer to stop    unusual bruising or discoloration on skin    chest pain    cough that does not go away    pain in the eye    hallucinations (unusual thoughts, seeing or hearing things that are not real)    fast or irregular heart beats    muscle cramps    muscle weakness    dilation of the pupils    restlessness    problems with sexual functions or desire    shakiness    shortness of breath    dark, tarry stool    blurring or changes of vision    severe or persistent vomiting  A few people may have an allergic reactions to this medicine. Symptoms can include difficulty breathing, skin rash, itching, swelling, or severe dizziness. If you notice any of these symptoms, seek medical help quickly.  Extra  Please speak with your doctor,  nurse, or pharmacist if you have any questions about this medicine.  [email or web address]  IMPORTANT NOTE: This document tells you briefly how to take your medicine, but it does not tell you all there is to know about it.Your doctor or pharmacist may give you other documents about your medicine. Please talk to them if you have any questions.Always follow their advice. There is a more complete description of this medicine available in English.Scan this code on your smartphone or tablet or use the web address below. You can also ask your pharmacist for a printout. If you have any questions, please ask your pharmacist.     2021 [email or web address].

## 2021-07-07 NOTE — PROGRESS NOTES
Assessment & Plan     1. Attention deficit hyperactivity disorder (ADHD), predominantly inattentive type  Patient presents with complaints of difficulty concentration and focus, especially while at work. Will continue current Adderall dosage and consult psych to determine if there is other cause for these symptoms. Previously well controlled on current dosage.  Consider worsening anxiety as a cause for decreased concentration. Start Effexor as below.  - [id number] - Urine Drug Confirmation Panel (Comprehensive)  - amphetamine-dextroamphetamine (ADDERALL XR) 20 MG 24 hr capsule; Take 1 capsule (20 mg) by mouth daily  Dispense: 30 capsule; Refill: 0  - amphetamine-dextroamphetamine (ADDERALL) 15 MG tablet; Take 1 capsule by mouth daily in the early afternoon  Dispense: 30 tablet; Refill: 0  - MENTAL HEALTH REFERRAL  - Adult; Psychiatry; Psychiatry; [facility] [place]: [facility] Psych[facility]ry [facility]/Bridge to Long-Term Psychiatry as indicated ([phone]); Yes; Diagnostic clarification; Yes; We will contact you to schedule the appointment o...    2. Anxiety   Patient reports increased anxiety due to work-related issues, including possible promotion. Has taken zoloft in the past and did not like taking this due to report of feeling more depressed. Will start Effexor and also as above, a psych consult to clarify diagnoses.  - venlafaxine (EFFEXOR-XR) 37.5 MG 24 hr capsule; Take 1 capsule (37.5 mg) by mouth daily for 7 days  Dispense: 7 capsule; Refill: 0  - venlafaxine (EFFEXOR-XR) 75 MG 24 hr capsule; Take 1 capsule (75 mg) by mouth daily  Dispense: 30 capsule; Refill: 1  - EMOTIONAL / BEHAVIORAL ASSESSMENT  - MENTAL HEALTH REFERRAL  - Adult; Psychiatry; Psychiatry; [place]: [facility] Psychiatry Service/Bridge to Long-Term Psychiatry as indicated ([phone]); Yes; Diagnostic clarification; Yes; We will contact you to schedule the appointment o...    3. Encounter for long-term (current) use of medications  - [id number] -  Urine Drug Confirmation Panel (Comprehensive)    Tanner Ferro MD  [facility]    Return in about 4 weeks (around 8/4/2021) for anxiety follow-up.    This chart is completed utilizing dictation software; typos and/or incorrect word substitutions may unintentionally occur.    Subjective     Baldemar Johnson is a 31 year old male who presents to clinic today for the following health issues:    History of Present Illness       Mental Health Follow-up:  Patient presents to follow-up on Anxiety.    Patient's anxiety since last visit has been:  Medium  The patient is not having other symptoms associated with anxiety.  Any significant life events: relationship concerns, job concerns, financial concerns, housing concerns and grief or loss  Patient is feeling anxious or having panic attacks.  Patient has no concerns about alcohol or drug use.     Social History  Tobacco Use    Smoking status: Current Every Day Smoker      Packs/day: 0.50      Years: 10.00      Pack years: 5      Types: Cigarettes    Smokeless tobacco: Never Used  Alcohol use: Not Currently    Comment: none   Drug use: No      Today's PHQ-9         PHQ-9 Total Score:     (P) 13   PHQ-9 Q9 Thoughts of better off dead/self-harm past 2 weeks :   (P) Not at all   Thoughts of suicide or self harm:      Self-harm Plan:        Self-harm Action:          Safety concerns for self or others:           SUBJECTIVE:  Patient is here today to recheck ADHD/ADD.    Updates since last visit:   Routine for taking medicine, including time: 4:00 PM everyday.   Time medicine wears off: Medication wears off after 8 hours.   Issues at school/Work: Patient is in a middle of a promotion and is stressed out at work. He currently works over nights at [employer].   Issues at home: Patient is currently living in a hotel.   Control of symptoms: worsening    Side effects:  Headaches: No  Stomach aches: No  Irritability/mood swings: No  Difficulties with  "sleep: Yes. No problem falling asleep. Patient can sleep for hours and hours on end.   Social withdrawal: No  Unusual movements/tics: No  Decreased appetite: No    Patient is here today for follow-up of his ADHD medication.  Denies any side effects of the medication such as appetite suppression is actually gained weight.  No other significant side effects.  He has noted increase in anxiety at work and difficulty with concentration because of this.  Is due for a promotion at his job.  Works 4 PM to 4 AM at [employer].  He has social anxiety.  He is wondering if increasing his Adderall medication will be helpful.  States he has been taking medication as prescribed.    States he has had anxiety in the past and tried Zoloft.  He did not like using this medication.  He denies any other substance abuse currently.    Review of Systems   Constitutional, HEENT, cardiovascular, pulmonary, GI, , musculoskeletal, neuro, skin, endocrine and psych systems are negative, except as otherwise noted.      Objective    /64   Pulse 110   Temp 98.5  F (36.9  C) (Temporal)   Resp 18   Ht 1.893 m (6' 2.53\")   Wt 91.7 kg (202 lb 1.6 oz)   SpO2 99%   BMI 25.58 kg/m    Body mass index is 25.58 kg/m .  Physical Exam   GENERAL: healthy, alert and no distress  EYES: Eyes grossly normal to inspection, PERRL and conjunctivae and sclerae normal  RESP: lungs clear to auscultation - no rales, rhonchi or wheezes  CV: regular rate and rhythm, normal S1 S2, no S3 or S4, no murmur, click or rub, no peripheral edema and peripheral pulses strong  MS: no gross musculoskeletal defects noted, no edema  PSYCH: mentation appears normal, affect normal/bright, reports difficulty concentrating and increase in anxiety. Denies depression, other than situational. Affect and rate of speech normal.    Labs: Pending      Answers for HPI/ROS submitted by the patient on 7/7/2021   Chronic problems general questions HPI Form  If you checked off any problems, " how difficult have these problems made it for you to do your work, take care of things at home, or get along with other people?: Very difficult  PHQ9 TOTAL SCORE: 13  KALANI 7 TOTAL SCORE: 7

## 2021-07-08 ASSESSMENT — PATIENT HEALTH QUESTIONNAIRE - PHQ9: SUM OF ALL RESPONSES TO PHQ QUESTIONS 1-9: 13

## 2021-07-08 ASSESSMENT — ANXIETY QUESTIONNAIRES: GAD7 TOTAL SCORE: 7

## 2021-07-09 LAB
AMPHET UR CFM-MCNC: ABNORMAL NG/ML
AMPHET/CREAT UR: ABNORMAL NG/MG{CREAT}
CREAT UR-MCNC: 187 MG/DL
RPT COMMENT: ABNORMAL

## 2021-07-30 ENCOUNTER — MYC REFILL (OUTPATIENT)
Dept: FAMILY MEDICINE | Facility: CLINIC | Age: 31
End: 2021-07-30

## 2021-07-30 DIAGNOSIS — F90.0 ATTENTION DEFICIT HYPERACTIVITY DISORDER (ADHD), PREDOMINANTLY INATTENTIVE TYPE: ICD-10-CM

## 2021-07-30 RX ORDER — DEXTROAMPHETAMINE SACCHARATE, AMPHETAMINE ASPARTATE MONOHYDRATE, DEXTROAMPHETAMINE SULFATE AND AMPHETAMINE SULFATE 5; 5; 5; 5 MG/1; MG/1; MG/1; MG/1
20 CAPSULE, EXTENDED RELEASE ORAL DAILY
Qty: 30 CAPSULE | Refills: 0 | Status: SHIPPED | OUTPATIENT
Start: 2021-08-06 | End: 2021-09-02

## 2021-07-30 RX ORDER — DEXTROAMPHETAMINE SACCHARATE, AMPHETAMINE ASPARTATE, DEXTROAMPHETAMINE SULFATE AND AMPHETAMINE SULFATE 3.75; 3.75; 3.75; 3.75 MG/1; MG/1; MG/1; MG/1
TABLET ORAL
Qty: 30 TABLET | Refills: 0 | Status: SHIPPED | OUTPATIENT
Start: 2021-08-06 | End: 2021-09-02

## 2021-07-30 NOTE — TELEPHONE ENCOUNTER
Patient has appointment scheduled.    Next 5 appointments (look out 90 days)    Aug 11, 2021  3:00 PM  Office Visit with Tanner Ferro MD  [facility] ([facility] ) [address], [address]  [facility] [id number] [phone]

## 2021-09-02 ENCOUNTER — MYC REFILL (OUTPATIENT)
Dept: FAMILY MEDICINE | Facility: CLINIC | Age: 31
End: 2021-09-02

## 2021-09-02 DIAGNOSIS — F90.0 ATTENTION DEFICIT HYPERACTIVITY DISORDER (ADHD), PREDOMINANTLY INATTENTIVE TYPE: ICD-10-CM

## 2021-09-02 RX ORDER — DEXTROAMPHETAMINE SACCHARATE, AMPHETAMINE ASPARTATE, DEXTROAMPHETAMINE SULFATE AND AMPHETAMINE SULFATE 3.75; 3.75; 3.75; 3.75 MG/1; MG/1; MG/1; MG/1
TABLET ORAL
Qty: 30 TABLET | Refills: 0 | Status: SHIPPED | OUTPATIENT
Start: 2021-09-02 | End: 2021-10-06

## 2021-09-02 RX ORDER — DEXTROAMPHETAMINE SACCHARATE, AMPHETAMINE ASPARTATE MONOHYDRATE, DEXTROAMPHETAMINE SULFATE AND AMPHETAMINE SULFATE 5; 5; 5; 5 MG/1; MG/1; MG/1; MG/1
20 CAPSULE, EXTENDED RELEASE ORAL DAILY
Qty: 30 CAPSULE | Refills: 0 | Status: SHIPPED | OUTPATIENT
Start: 2021-09-02 | End: 2021-10-06

## 2021-09-26 ENCOUNTER — HEALTH MAINTENANCE LETTER (OUTPATIENT)
Age: 31
End: 2021-09-26

## 2021-10-04 ENCOUNTER — MYC REFILL (OUTPATIENT)
Dept: FAMILY MEDICINE | Facility: CLINIC | Age: 31
End: 2021-10-04

## 2021-10-04 DIAGNOSIS — F90.0 ATTENTION DEFICIT HYPERACTIVITY DISORDER (ADHD), PREDOMINANTLY INATTENTIVE TYPE: ICD-10-CM

## 2021-10-04 RX ORDER — DEXTROAMPHETAMINE SACCHARATE, AMPHETAMINE ASPARTATE MONOHYDRATE, DEXTROAMPHETAMINE SULFATE AND AMPHETAMINE SULFATE 5; 5; 5; 5 MG/1; MG/1; MG/1; MG/1
20 CAPSULE, EXTENDED RELEASE ORAL DAILY
Qty: 30 CAPSULE | Refills: 0 | OUTPATIENT
Start: 2021-10-04

## 2021-10-04 RX ORDER — DEXTROAMPHETAMINE SACCHARATE, AMPHETAMINE ASPARTATE, DEXTROAMPHETAMINE SULFATE AND AMPHETAMINE SULFATE 3.75; 3.75; 3.75; 3.75 MG/1; MG/1; MG/1; MG/1
TABLET ORAL
Qty: 30 TABLET | Refills: 0 | OUTPATIENT
Start: 2021-10-04

## 2021-10-05 ENCOUNTER — MYC MEDICAL ADVICE (OUTPATIENT)
Dept: GENERAL RADIOLOGY | Facility: OTHER | Age: 31
End: 2021-10-05

## 2021-10-05 NOTE — PROGRESS NOTES
Assessment & Plan   1. Attention deficit hyperactivity disorder (ADHD), combined type: Seems to be doing well on current dosage.  Works 6 days a week and can consider holding on the seventh day.  Stimulants may be worsening his anxiety as below however and recommend formal psych consult.   reviewed.  3-month supply given.  - MENTAL HEALTH REFERRAL  - Adult; Outpatient Treatment, Psychiatry; Individual/Couples/Family/Group Therapy/Health Psychology; [facility] [phone]; We will contact you to schedule the appointment or please call with any questio...; Future  - amphetamine-dextroamphetamine (ADDERALL XR) 20 MG 24 hr capsule; Take 1 capsule (20 mg) by mouth daily  Dispense: 30 capsule; Refill: 0  - amphetamine-dextroamphetamine (ADDERALL) 15 MG tablet; Take 1 capsule by mouth daily in the early afternoon  Dispense: 30 tablet; Refill: 0    2. KALANI (generalized anxiety disorder): Trial second SSRI in place of previous Zoloft.  Failed most recent SNRI.  Will start on Prozac.  Mental health referral placed for both psychiatry and therapy.  We will follow-up in 1 month's time.  - FLUoxetine (PROZAC) 10 MG capsule; Take 1 capsule (10 mg) by mouth daily for 14 days  Dispense: 14 capsule; Refill: 0  - MENTAL HEALTH REFERRAL  - Adult; Outpatient Treatment, Psychiatry; Individual/Couples/Family/Group Therapy/Health Psychology; [facility] [phone]; We will contact you to schedule the appointment or please call with any questio...; Future  - FLUoxetine (PROZAC) 20 MG capsule; Take 1 capsule (20 mg) by mouth daily  Dispense: 30 capsule; Refill: 1    3. High priority for 2019-nCoV vaccine  - COVID-19,PF,PFIZER    4. Need for prophylactic vaccination and inoculation against influenza    5. Need for 23-polyvalent pneumococcal polysaccharide vaccine      Return in about 4 weeks (around 11/3/2021).    Tanner Ferro MD  [facility]    This chart is  completed utilizing dictation software; typos and/or incorrect word substitutions may unintentionally occur.      Du Junior is a 31 year old who presents for the following health issues  accompanied by his self:    History of Present Illness       He eats 0-1 servings of fruits and vegetables daily.He consumes 4 sweetened beverage(s) daily.He exercises with enough effort to increase his heart rate 60 or more minutes per day.  He exercises with enough effort to increase his heart rate 5 days per week.   He is taking medications regularly.       Anxiety Follow-Up    How are you doing with your anxiety since your last visit? No change    Are you having other symptoms that might be associated with anxiety? No    Have you had a significant life event? Job Concerns     Are you feeling depressed? No    Do you have any concerns with your use of alcohol or other drugs? No     Patient is here today to discuss his anxiety as well as ADHD.  States he tried the Effexor for approximately 1 week but got significant diarrhea especially at night.  This was more anxiety provoking for him than normal.  He stopped the medication for proxy 1 week then restarted it and obtained same symptoms.  This will be added to his medication intolerance list.    Previously tried Zoloft but did not like the way he felt on it as it made him feel numb and emotional/easy to cry without reason.    He believes the current dose of Adderall is working but believes it is less effective if he takes it daily.  Notices an improvement in affect if he goes off for a day or 2 at a time.    Denies SI.    Continues to smoke.    Okay getting flu, Covid, pneumonia shots.    Social History     Tobacco Use     Smoking status: Current Every Day Smoker     Packs/day: 1.00     Years: 12.00     Pack years: 12.00     Types: Cigarettes     Smokeless tobacco: Never Used   Vaping Use     Vaping Use: Never used   Substance Use Topics     Alcohol use: Not Currently      "Comment: VERY RARELY      Drug use: No     KALANI-7 SCORE 2/2/2021 7/7/2021 10/6/2021   Total Score 6 (mild anxiety) 7 (mild anxiety) 6 (mild anxiety)   Total Score 6 7 6     PHQ 2/3/2021 7/7/2021 10/6/2021   PHQ-9 Total Score 13 13 6   Q9: Thoughts of better off dead/self-harm past 2 weeks Not at all Not at all Not at all     Review of Systems   Constitutional, HEENT, cardiovascular, pulmonary, gi and gu systems are negative, except as otherwise noted.      Objective    /60 (BP Location: Left arm, Patient Position: Chair, Cuff Size: Adult Large)   Pulse 84   Temp 98.6  F (37  C) (Temporal)   Resp 17   Ht 1.892 m (6' 2.5\")   Wt 93.4 kg (206 lb)   SpO2 98%   BMI 26.10 kg/m    Body mass index is 26.1 kg/m .  Physical Exam   General: Appears well and in no acute distress.  Cardiovascular: Regular rate and rhythm, normal S1 and S2 without murmur. No extra heartsounds or friction rub. Radial pulses present and equal bilaterally.  Respiratory:  Lungs clear to auscultation bilaterally. No wheezing or crackles. No prolonged expiration. Symmetrical chest rise.  Musculoskeletal: No gross extremity deformities. No peripheral edema. Normal muscle bulk.     Labs: None        Answers for HPI/ROS submitted by the patient on 10/6/2021  If you checked off any problems, how difficult have these problems made it for you to do your work, take care of things at home, or get along with other people?: Somewhat difficult  PHQ9 TOTAL SCORE: 6  KALANI 7 TOTAL SCORE: 6      "

## 2021-10-06 ENCOUNTER — TELEPHONE (OUTPATIENT)
Dept: FAMILY MEDICINE | Facility: CLINIC | Age: 31
End: 2021-10-06

## 2021-10-06 ENCOUNTER — OFFICE VISIT (OUTPATIENT)
Dept: FAMILY MEDICINE | Facility: CLINIC | Age: 31
End: 2021-10-06
Payer: COMMERCIAL

## 2021-10-06 VITALS
SYSTOLIC BLOOD PRESSURE: 120 MMHG | DIASTOLIC BLOOD PRESSURE: 60 MMHG | OXYGEN SATURATION: 98 % | WEIGHT: 206 LBS | BODY MASS INDEX: 25.61 KG/M2 | RESPIRATION RATE: 17 BRPM | HEART RATE: 84 BPM | HEIGHT: 75 IN | TEMPERATURE: 98.6 F

## 2021-10-06 DIAGNOSIS — Z23 NEED FOR 23-POLYVALENT PNEUMOCOCCAL POLYSACCHARIDE VACCINE: ICD-10-CM

## 2021-10-06 DIAGNOSIS — F41.1 GAD (GENERALIZED ANXIETY DISORDER): ICD-10-CM

## 2021-10-06 DIAGNOSIS — Z23 NEED FOR PROPHYLACTIC VACCINATION AND INOCULATION AGAINST INFLUENZA: ICD-10-CM

## 2021-10-06 DIAGNOSIS — F90.2 ATTENTION DEFICIT HYPERACTIVITY DISORDER (ADHD), COMBINED TYPE: Primary | ICD-10-CM

## 2021-10-06 DIAGNOSIS — Z23 HIGH PRIORITY FOR 2019-NCOV VACCINE: ICD-10-CM

## 2021-10-06 PROCEDURE — 96127 BRIEF EMOTIONAL/BEHAV ASSMT: CPT | Mod: 59 | Performed by: FAMILY MEDICINE

## 2021-10-06 PROCEDURE — 90471 IMMUNIZATION ADMIN: CPT | Performed by: FAMILY MEDICINE

## 2021-10-06 PROCEDURE — 99214 OFFICE O/P EST MOD 30 MIN: CPT | Mod: 25 | Performed by: FAMILY MEDICINE

## 2021-10-06 PROCEDURE — 90732 PPSV23 VACC 2 YRS+ SUBQ/IM: CPT | Performed by: FAMILY MEDICINE

## 2021-10-06 PROCEDURE — 91300 COVID-19,PF,PFIZER (12+ YRS): CPT | Performed by: FAMILY MEDICINE

## 2021-10-06 PROCEDURE — 0001A COVID-19,PF,PFIZER (12+ YRS): CPT | Performed by: FAMILY MEDICINE

## 2021-10-06 PROCEDURE — 90686 IIV4 VACC NO PRSV 0.5 ML IM: CPT | Performed by: FAMILY MEDICINE

## 2021-10-06 PROCEDURE — 90472 IMMUNIZATION ADMIN EACH ADD: CPT | Performed by: FAMILY MEDICINE

## 2021-10-06 RX ORDER — DEXTROAMPHETAMINE SACCHARATE, AMPHETAMINE ASPARTATE, DEXTROAMPHETAMINE SULFATE AND AMPHETAMINE SULFATE 3.75; 3.75; 3.75; 3.75 MG/1; MG/1; MG/1; MG/1
TABLET ORAL
Qty: 30 TABLET | Refills: 0 | Status: SHIPPED | OUTPATIENT
Start: 2021-10-06 | End: 2021-10-06

## 2021-10-06 RX ORDER — FLUOXETINE 10 MG/1
10 CAPSULE ORAL DAILY
Qty: 14 CAPSULE | Refills: 0 | Status: SHIPPED | OUTPATIENT
Start: 2021-10-06 | End: 2021-12-13

## 2021-10-06 RX ORDER — DEXTROAMPHETAMINE SACCHARATE, AMPHETAMINE ASPARTATE MONOHYDRATE, DEXTROAMPHETAMINE SULFATE AND AMPHETAMINE SULFATE 5; 5; 5; 5 MG/1; MG/1; MG/1; MG/1
20 CAPSULE, EXTENDED RELEASE ORAL DAILY
Qty: 30 CAPSULE | Refills: 0 | Status: SHIPPED | OUTPATIENT
Start: 2021-12-05 | End: 2021-10-06

## 2021-10-06 RX ORDER — DEXTROAMPHETAMINE SACCHARATE, AMPHETAMINE ASPARTATE MONOHYDRATE, DEXTROAMPHETAMINE SULFATE AND AMPHETAMINE SULFATE 5; 5; 5; 5 MG/1; MG/1; MG/1; MG/1
20 CAPSULE, EXTENDED RELEASE ORAL DAILY
Qty: 30 CAPSULE | Refills: 0 | Status: SHIPPED | OUTPATIENT
Start: 2021-11-05 | End: 2021-12-13

## 2021-10-06 RX ORDER — DEXTROAMPHETAMINE SACCHARATE, AMPHETAMINE ASPARTATE, DEXTROAMPHETAMINE SULFATE AND AMPHETAMINE SULFATE 3.75; 3.75; 3.75; 3.75 MG/1; MG/1; MG/1; MG/1
TABLET ORAL
Qty: 30 TABLET | Refills: 0 | Status: SHIPPED | OUTPATIENT
Start: 2021-10-06 | End: 2021-12-13

## 2021-10-06 RX ORDER — DEXTROAMPHETAMINE SACCHARATE, AMPHETAMINE ASPARTATE MONOHYDRATE, DEXTROAMPHETAMINE SULFATE AND AMPHETAMINE SULFATE 5; 5; 5; 5 MG/1; MG/1; MG/1; MG/1
20 CAPSULE, EXTENDED RELEASE ORAL DAILY
Qty: 30 CAPSULE | Refills: 0 | Status: SHIPPED | OUTPATIENT
Start: 2021-10-06 | End: 2021-12-13

## 2021-10-06 RX ORDER — DEXTROAMPHETAMINE SACCHARATE, AMPHETAMINE ASPARTATE, DEXTROAMPHETAMINE SULFATE AND AMPHETAMINE SULFATE 3.75; 3.75; 3.75; 3.75 MG/1; MG/1; MG/1; MG/1
TABLET ORAL
Qty: 30 TABLET | Refills: 0 | Status: SHIPPED | OUTPATIENT
Start: 2021-11-05 | End: 2021-10-06

## 2021-10-06 RX ORDER — DEXTROAMPHETAMINE SACCHARATE, AMPHETAMINE ASPARTATE MONOHYDRATE, DEXTROAMPHETAMINE SULFATE AND AMPHETAMINE SULFATE 5; 5; 5; 5 MG/1; MG/1; MG/1; MG/1
20 CAPSULE, EXTENDED RELEASE ORAL DAILY
Qty: 30 CAPSULE | Refills: 0 | Status: SHIPPED | OUTPATIENT
Start: 2021-10-06 | End: 2021-10-06

## 2021-10-06 RX ORDER — DEXTROAMPHETAMINE SACCHARATE, AMPHETAMINE ASPARTATE, DEXTROAMPHETAMINE SULFATE AND AMPHETAMINE SULFATE 3.75; 3.75; 3.75; 3.75 MG/1; MG/1; MG/1; MG/1
TABLET ORAL
Qty: 30 TABLET | Refills: 0 | Status: SHIPPED | OUTPATIENT
Start: 2021-12-05 | End: 2021-10-06

## 2021-10-06 RX ORDER — DEXTROAMPHETAMINE SACCHARATE, AMPHETAMINE ASPARTATE MONOHYDRATE, DEXTROAMPHETAMINE SULFATE AND AMPHETAMINE SULFATE 5; 5; 5; 5 MG/1; MG/1; MG/1; MG/1
20 CAPSULE, EXTENDED RELEASE ORAL DAILY
Qty: 30 CAPSULE | Refills: 0 | Status: SHIPPED | OUTPATIENT
Start: 2021-11-05 | End: 2021-10-06

## 2021-10-06 ASSESSMENT — ANXIETY QUESTIONNAIRES
GAD7 TOTAL SCORE: 6
6. BECOMING EASILY ANNOYED OR IRRITABLE: NEARLY EVERY DAY
2. NOT BEING ABLE TO STOP OR CONTROL WORRYING: NOT AT ALL
GAD7 TOTAL SCORE: 6
7. FEELING AFRAID AS IF SOMETHING AWFUL MIGHT HAPPEN: NOT AT ALL
1. FEELING NERVOUS, ANXIOUS, OR ON EDGE: SEVERAL DAYS
8. IF YOU CHECKED OFF ANY PROBLEMS, HOW DIFFICULT HAVE THESE MADE IT FOR YOU TO DO YOUR WORK, TAKE CARE OF THINGS AT HOME, OR GET ALONG WITH OTHER PEOPLE?: SOMEWHAT DIFFICULT
5. BEING SO RESTLESS THAT IT IS HARD TO SIT STILL: SEVERAL DAYS
7. FEELING AFRAID AS IF SOMETHING AWFUL MIGHT HAPPEN: NOT AT ALL
4. TROUBLE RELAXING: SEVERAL DAYS
3. WORRYING TOO MUCH ABOUT DIFFERENT THINGS: NOT AT ALL
GAD7 TOTAL SCORE: 6

## 2021-10-06 ASSESSMENT — PATIENT HEALTH QUESTIONNAIRE - PHQ9
SUM OF ALL RESPONSES TO PHQ QUESTIONS 1-9: 6
SUM OF ALL RESPONSES TO PHQ QUESTIONS 1-9: 6
10. IF YOU CHECKED OFF ANY PROBLEMS, HOW DIFFICULT HAVE THESE PROBLEMS MADE IT FOR YOU TO DO YOUR WORK, TAKE CARE OF THINGS AT HOME, OR GET ALONG WITH OTHER PEOPLE: SOMEWHAT DIFFICULT

## 2021-10-06 ASSESSMENT — PAIN SCALES - GENERAL: PAINLEVEL: NO PAIN (0)

## 2021-10-06 ASSESSMENT — MIFFLIN-ST. JEOR: SCORE: 1977.1

## 2021-10-06 NOTE — NURSING NOTE
Prior to immunization administration, verified patients identity using patient s name and date of birth. Please see Immunization Activity for additional information.     Screening Questionnaire for Adult Immunization    Are you sick today?   No   Do you have allergies to medications, food, a vaccine component or latex?   No   Have you ever had a serious reaction after receiving a vaccination?   No   Do you have a long-term health problem with heart, lung, kidney, or metabolic disease (e.g., diabetes), asthma, a blood disorder, no spleen, complement component deficiency, a cochlear implant, or a spinal fluid leak?  Are you on long-term aspirin therapy?   No   Do you have cancer, leukemia, HIV/AIDS, or any other immune system problem?   No   Do you have a parent, brother, or sister with an immune system problem?   No   In the past 3 months, have you taken medications that affect  your immune system, such as prednisone, other steroids, or anticancer drugs; drugs for the treatment of rheumatoid arthritis, Crohn s disease, or psoriasis; or have you had radiation treatments?   No   Have you had a seizure, or a brain or other nervous system problem?   No   During the past year, have you received a transfusion of blood or blood    products, or been given immune (gamma) globulin or antiviral drug?   No   For women: Are you pregnant or is there a chance you could become       pregnant during the next month?   No   Have you received any vaccinations in the past 4 weeks?   No     Immunization questionnaire answers were all negative.        Per orders of Dr. Pool, injection of PPSV23 given by Tresa Jackson CMA. Patient instructed to remain in clinic for 15 minutes afterwards, and to report any adverse reaction to me immediately.       Screening performed by Tresa Jackson CMA on 10/6/2021 at 2:58 PM.

## 2021-10-06 NOTE — PATIENT INSTRUCTIONS
Patient Education     Attention-Deficit/Hyperactivity Disorder (ADHD) in Adults  You ve always had trouble concentrating. Your mind wanders, and it s hard to finish tasks. As a result, you didn t do well in school. And now, you often struggle with your job. Sometimes this makes you eaton or depressed. These may be symptoms of attention-deficit/hyperactivity disorder (ADHD). To find out more, talk to your healthcare provider. He or she can offer guidance and support.  Symptoms of ADHD in adults  For an adult to be diagnosed with ADHD, the symptoms must have been present since childhood. The symptoms may include:    Trouble thinking things through    Low self-esteem    Depression    Trouble holding a job    Memory problems    Problems with a marriage or relationship    Lack of discipline    Trouble finishing tasks or projects  What is ADHD?  Attention-deficit/hyperactivity disorder makes it hard to focus your mind. You may daydream a lot. And you may be restless much of the time. As a result, you may have trouble with detailed or boring work. And it may be hard to stick with one project for very long. You also may forget things. Or, you may miss key points during a lecture or meeting. You may even have trouble sitting through a movie or concert. At times, you may feel frustrated or angry. This can affect your relationships with others.  Who does it affect?  ADHD starts in childhood. Sometimes, your symptoms may improve as you get older. But they also may persist into your adult years. ADHD is often thought of as a  kid s problem.  That s why it s often missed in adults. In fact, many parents learn they have ADHD when their children are diagnosed.  What causes it?  The exact cause of ADHD isn t known. The disorder does run in families. Having one parent with ADHD makes it more likely you ll have it too. And the part of your brain that controls attention may be involved. Certain brain chemicals that are out of balance  may also play a role.  What can be done?  The first step is finding out if you have ADHD. Your doctor will use special guidelines to diagnose the disorder. Most adults with ADHD are greatly helped by some combination of medicine, therapy and coaching.  To learn more    Children and Adults with Attention-Deficit/Hyperactivity Disorder (LIBRA) https://libra.org/    Attention Deficit Disorder Association (ADDA) https://add.org/    [employer] last reviewed this educational content on 4/1/2020 2000-2021 [employer]. All rights reserved. This information is not intended as a substitute for professional medical care. Always follow your healthcare professional's instructions.

## 2021-10-06 NOTE — TELEPHONE ENCOUNTER
Reason for Call:  Other prescription    Detailed comments:  Dr. Pool ordered generic Adderall 20mg. Insurance needs it be the name brand not the generic.    Phone Number Patient can be reached at: Home number on file [phone] (home)    Best Time: Anytime    Can we leave a detailed message on this number? YES    Call taken on 10/6/2021 at 3:11 PM by Lore Shahid

## 2021-10-06 NOTE — TELEPHONE ENCOUNTER
Replied via [employer]. New Rx's sent after calling to confirm with the pharmacy.    Tanner Ferro MD  [facility]

## 2021-10-07 ASSESSMENT — PATIENT HEALTH QUESTIONNAIRE - PHQ9: SUM OF ALL RESPONSES TO PHQ QUESTIONS 1-9: 6

## 2021-10-07 ASSESSMENT — ANXIETY QUESTIONNAIRES: GAD7 TOTAL SCORE: 6

## 2021-10-18 ENCOUNTER — TELEPHONE (OUTPATIENT)
Dept: FAMILY MEDICINE | Facility: CLINIC | Age: 31
End: 2021-10-18

## 2021-10-18 NOTE — TELEPHONE ENCOUNTER
Left Voicemail and sent [employer] message to reschedule pt. Please help patient reschedule the canceled appt below:       - Provider:  Dr. Pool           - Canceled appt details         Date: 10/27/2021         Time:  2:00pm         Type of visit:  Short      - Reason for change/cancellation: Provider out of clinic until 11/29/2021       Notes to consider when rescheduling:         Please close encounter once the patient has been rescheduled

## 2021-10-27 ENCOUNTER — IMMUNIZATION (OUTPATIENT)
Dept: FAMILY MEDICINE | Facility: CLINIC | Age: 31
End: 2021-10-27
Attending: FAMILY MEDICINE
Payer: COMMERCIAL

## 2021-10-27 DIAGNOSIS — Z23 HIGH PRIORITY FOR 2019-NCOV VACCINE: ICD-10-CM

## 2021-10-27 PROCEDURE — 0002A COVID-19,PF,PFIZER (12+ YRS): CPT

## 2021-10-27 PROCEDURE — 99207 PR NO CHARGE LOS: CPT

## 2021-10-27 PROCEDURE — 91300 COVID-19,PF,PFIZER (12+ YRS): CPT

## 2021-12-11 ENCOUNTER — MYC REFILL (OUTPATIENT)
Dept: FAMILY MEDICINE | Facility: CLINIC | Age: 31
End: 2021-12-11
Payer: COMMERCIAL

## 2021-12-11 DIAGNOSIS — F90.2 ATTENTION DEFICIT HYPERACTIVITY DISORDER (ADHD), COMBINED TYPE: ICD-10-CM

## 2021-12-11 RX ORDER — DEXTROAMPHETAMINE SACCHARATE, AMPHETAMINE ASPARTATE, DEXTROAMPHETAMINE SULFATE AND AMPHETAMINE SULFATE 3.75; 3.75; 3.75; 3.75 MG/1; MG/1; MG/1; MG/1
TABLET ORAL
Qty: 30 TABLET | Refills: 0 | Status: CANCELLED | OUTPATIENT
Start: 2021-12-11

## 2021-12-11 RX ORDER — DEXTROAMPHETAMINE SACCHARATE, AMPHETAMINE ASPARTATE MONOHYDRATE, DEXTROAMPHETAMINE SULFATE AND AMPHETAMINE SULFATE 5; 5; 5; 5 MG/1; MG/1; MG/1; MG/1
20 CAPSULE, EXTENDED RELEASE ORAL DAILY
Qty: 30 CAPSULE | Refills: 0 | Status: CANCELLED | OUTPATIENT
Start: 2021-12-11

## 2021-12-13 DIAGNOSIS — F90.2 ATTENTION DEFICIT HYPERACTIVITY DISORDER (ADHD), COMBINED TYPE: Primary | ICD-10-CM

## 2021-12-13 RX ORDER — DEXTROAMPHETAMINE SACCHARATE, AMPHETAMINE ASPARTATE MONOHYDRATE, DEXTROAMPHETAMINE SULFATE AND AMPHETAMINE SULFATE 5; 5; 5; 5 MG/1; MG/1; MG/1; MG/1
20 CAPSULE, EXTENDED RELEASE ORAL DAILY
Qty: 30 CAPSULE | Refills: 0 | Status: SHIPPED | OUTPATIENT
Start: 2022-02-11 | End: 2022-03-04

## 2021-12-13 RX ORDER — DEXTROAMPHETAMINE SACCHARATE, AMPHETAMINE ASPARTATE, DEXTROAMPHETAMINE SULFATE AND AMPHETAMINE SULFATE 3.75; 3.75; 3.75; 3.75 MG/1; MG/1; MG/1; MG/1
TABLET ORAL
Qty: 30 TABLET | Refills: 0 | Status: SHIPPED | OUTPATIENT
Start: 2021-12-13 | End: 2021-12-13

## 2021-12-13 RX ORDER — DEXTROAMPHETAMINE SACCHARATE, AMPHETAMINE ASPARTATE MONOHYDRATE, DEXTROAMPHETAMINE SULFATE AND AMPHETAMINE SULFATE 5; 5; 5; 5 MG/1; MG/1; MG/1; MG/1
20 CAPSULE, EXTENDED RELEASE ORAL DAILY
Qty: 30 CAPSULE | Refills: 0 | Status: SHIPPED | OUTPATIENT
Start: 2021-12-13 | End: 2021-12-13

## 2021-12-13 RX ORDER — DEXTROAMPHETAMINE SACCHARATE, AMPHETAMINE ASPARTATE, DEXTROAMPHETAMINE SULFATE AND AMPHETAMINE SULFATE 3.75; 3.75; 3.75; 3.75 MG/1; MG/1; MG/1; MG/1
TABLET ORAL
Qty: 30 TABLET | Refills: 0 | Status: SHIPPED | OUTPATIENT
Start: 2022-01-12 | End: 2021-12-13

## 2021-12-13 RX ORDER — DEXTROAMPHETAMINE SACCHARATE, AMPHETAMINE ASPARTATE MONOHYDRATE, DEXTROAMPHETAMINE SULFATE AND AMPHETAMINE SULFATE 5; 5; 5; 5 MG/1; MG/1; MG/1; MG/1
20 CAPSULE, EXTENDED RELEASE ORAL DAILY
Qty: 30 CAPSULE | Refills: 0 | Status: SHIPPED | OUTPATIENT
Start: 2022-01-12 | End: 2021-12-13

## 2021-12-13 RX ORDER — DEXTROAMPHETAMINE SACCHARATE, AMPHETAMINE ASPARTATE, DEXTROAMPHETAMINE SULFATE AND AMPHETAMINE SULFATE 3.75; 3.75; 3.75; 3.75 MG/1; MG/1; MG/1; MG/1
TABLET ORAL
Qty: 30 TABLET | Refills: 0 | Status: SHIPPED | OUTPATIENT
Start: 2022-02-11 | End: 2022-03-04

## 2021-12-13 NOTE — TELEPHONE ENCOUNTER
Pending Prescriptions:                       Disp   Refills    amphetamine-dextroamphetamine (ADDERALL) 1*30 tab*0        Sig: Take 1 capsule by mouth daily in the early afternoon    amphetamine-dextroamphetamine (ADDERALL XR*30 cap*0        Sig: Take 1 capsule (20 mg) by mouth daily    Routing refill request to provider for review/approval because:  Drug not on the [facility] refill protocol

## 2022-01-16 ENCOUNTER — HEALTH MAINTENANCE LETTER (OUTPATIENT)
Age: 32
End: 2022-01-16

## 2022-01-18 ENCOUNTER — MYC MEDICAL ADVICE (OUTPATIENT)
Dept: FAMILY MEDICINE | Facility: OTHER | Age: 32
End: 2022-01-18
Payer: COMMERCIAL

## 2022-01-18 NOTE — TELEPHONE ENCOUNTER
"Pt MyChart message: \"    Am I able to get a new copy of my Covid vaccine card? As mine got destroyed.\"  Starting a new encounter.    Diana Conti, MIRANDAN, RN, PHN  Registered Nurse-Clinic Triage  [facility]  1/18/2022 at 12:02 PM    "

## 2022-01-20 NOTE — TELEPHONE ENCOUNTER
Please make a new COVID Vaccine card for patient, and then let him know when he can pick it up.   Vaccine info in his chart.    Thank you   Junie Francisco RN on 1/20/2022 at 8:10 AM

## 2022-01-20 NOTE — TELEPHONE ENCOUNTER
Card completed and brought to . Mychart sent.    Nuzhat Griffith CMA ([facility])

## 2022-02-28 NOTE — PROGRESS NOTES
Assessment & Plan   1. Throat pain: No abnormality on exam.  Recommend ENT referral for further evaluation in office plus or minus imaging.  - Otolaryngology Referral; Future    2. Attention deficit hyperactivity disorder (ADHD), combined type: Refilled medication.   reviewed.  Refills good for 6 months.  - amphetamine-dextroamphetamine (ADDERALL XR) 20 MG 24 hr capsule; Take 1 capsule (20 mg) by mouth daily  Dispense: 30 capsule; Refill: 0  - amphetamine-dextroamphetamine (ADDERALL) 15 MG tablet; Take 1 capsule by mouth daily in the early afternoon  Dispense: 30 tablet; Refill: 0    3. KALANI (generalized anxiety disorder): Controlled.  - FLUoxetine (PROZAC) 20 MG capsule; Take 1 capsule (20 mg) by mouth daily  Dispense: 90 capsule; Refill: 1    4. Moderate episode of recurrent major depressive disorder (H): Controlled.  Prozac filled as above.      Return in about 4 weeks (around 4/1/2022), or if symptoms worsen or fail to improve.    Tanner Ferro MD  [facility]    This chart is completed utilizing dictation software; typos and/or incorrect word substitutions may unintentionally occur.      Du Junior is a 32 year old who presents for the following health issues:    History of Present Illness     Reason for visit:  Intermittent throat pain issue when talking  Symptom onset:  More than a month  Symptoms include:  Sharp throat pain when talking followed by a gag reflex if trying to push through the pain  Symptom intensity:  Moderate  Symptom progression:  Staying the same  Had these symptoms before:  No  What makes it worse:  Any vibration vocally (talking, singing, humming)  What makes it better:  Staying completely silent    He eats 0-1 servings of fruits and vegetables daily.He consumes 2 sweetened beverage(s) daily.He exercises with enough effort to increase his heart rate 60 or more minutes per day.  He exercises with enough effort to increase his heart rate 5  "days per week. He is missing 1 dose(s) of medications per week.  He is not taking prescribed medications regularly due to other.     Sharp throat pain \"in his voice box\" with singing, humming, causes gaging. Continue until goes to bed and then resolves after 2-3 days. Has happened about 4 times thus far since the middle of January. Rated as a 3/10 at it's worst.    Was working with bleach prior to this and cleaning a room with black mold in it. No fevers, lymph nodes. No shortness of breath. No change in his voice.    Friend past away from throat cancer. Patient is an active smoker. Tried Chantix, but can't break the habit.  Not interested in quitting at this time.    Also requesting refills of his Adderall and Prozac.  Weight stable.  No side effects or headaches etc.  No SI.   reviewed.  Working well for patient.    Review of Systems   Constitutional, HEENT, lymph, cardiovascular, pulmonary, gi and gu systems are negative, except as otherwise noted.      Objective    /70   Pulse 83   Temp 97.5  F (36.4  C) (Temporal)   Resp 12   Ht 1.905 m (6' 3\")   Wt 89.8 kg (198 lb)   SpO2 98%   BMI 24.75 kg/m    Body mass index is 24.75 kg/m .  Physical Exam   General: Appears well and in no acute distress.  Heme/Lymph: No supraclavicular, anterior, or posterior cervical lymphadenopathy.  HEENT: Eyes grossly normal to inspection. Extraocular movements intact. Pupils equal, round, and reactive to light. Mucous membranes moist. No ulcers or lesions noted in the oropharynx.  Cardiovascular: Regular rate and rhythm, normal S1 and S2 without murmur. No extra heartsounds or friction rub. Radial pulses present and equal bilaterally.  Respiratory: Lungs clear to auscultation bilaterally. No wheezing or crackles. No prolonged expiration. Symmetrical chest rise.  Musculoskeletal: No gross extremity deformities. No peripheral edema. Normal muscle bulk.    Labs: None        "

## 2022-03-04 ENCOUNTER — OFFICE VISIT (OUTPATIENT)
Dept: FAMILY MEDICINE | Facility: CLINIC | Age: 32
End: 2022-03-04
Payer: COMMERCIAL

## 2022-03-04 VITALS
OXYGEN SATURATION: 98 % | WEIGHT: 198 LBS | TEMPERATURE: 97.5 F | DIASTOLIC BLOOD PRESSURE: 70 MMHG | BODY MASS INDEX: 24.62 KG/M2 | HEIGHT: 75 IN | RESPIRATION RATE: 12 BRPM | SYSTOLIC BLOOD PRESSURE: 118 MMHG | HEART RATE: 83 BPM

## 2022-03-04 DIAGNOSIS — F90.2 ATTENTION DEFICIT HYPERACTIVITY DISORDER (ADHD), COMBINED TYPE: ICD-10-CM

## 2022-03-04 DIAGNOSIS — F41.1 GAD (GENERALIZED ANXIETY DISORDER): ICD-10-CM

## 2022-03-04 DIAGNOSIS — F33.1 MODERATE EPISODE OF RECURRENT MAJOR DEPRESSIVE DISORDER (H): ICD-10-CM

## 2022-03-04 DIAGNOSIS — R07.0 THROAT PAIN: Primary | ICD-10-CM

## 2022-03-04 PROCEDURE — 99214 OFFICE O/P EST MOD 30 MIN: CPT | Performed by: FAMILY MEDICINE

## 2022-03-04 RX ORDER — DEXTROAMPHETAMINE SACCHARATE, AMPHETAMINE ASPARTATE MONOHYDRATE, DEXTROAMPHETAMINE SULFATE AND AMPHETAMINE SULFATE 5; 5; 5; 5 MG/1; MG/1; MG/1; MG/1
20 CAPSULE, EXTENDED RELEASE ORAL DAILY
Qty: 30 CAPSULE | Refills: 0 | Status: SHIPPED | OUTPATIENT
Start: 2022-05-03 | End: 2022-06-29

## 2022-03-04 RX ORDER — DEXTROAMPHETAMINE SACCHARATE, AMPHETAMINE ASPARTATE MONOHYDRATE, DEXTROAMPHETAMINE SULFATE AND AMPHETAMINE SULFATE 5; 5; 5; 5 MG/1; MG/1; MG/1; MG/1
20 CAPSULE, EXTENDED RELEASE ORAL DAILY
Qty: 30 CAPSULE | Refills: 0 | Status: SHIPPED | OUTPATIENT
Start: 2022-03-04 | End: 2022-03-04

## 2022-03-04 RX ORDER — DEXTROAMPHETAMINE SACCHARATE, AMPHETAMINE ASPARTATE, DEXTROAMPHETAMINE SULFATE AND AMPHETAMINE SULFATE 3.75; 3.75; 3.75; 3.75 MG/1; MG/1; MG/1; MG/1
TABLET ORAL
Qty: 30 TABLET | Refills: 0 | Status: SHIPPED | OUTPATIENT
Start: 2022-05-03 | End: 2022-06-29

## 2022-03-04 RX ORDER — TRIAMCINOLONE ACETONIDE 1 MG/G
OINTMENT TOPICAL
Qty: 15 G | Refills: 1 | Status: CANCELLED | OUTPATIENT
Start: 2022-03-04

## 2022-03-04 RX ORDER — DEXTROAMPHETAMINE SACCHARATE, AMPHETAMINE ASPARTATE, DEXTROAMPHETAMINE SULFATE AND AMPHETAMINE SULFATE 3.75; 3.75; 3.75; 3.75 MG/1; MG/1; MG/1; MG/1
TABLET ORAL
Qty: 30 TABLET | Refills: 0 | Status: SHIPPED | OUTPATIENT
Start: 2022-04-03 | End: 2022-03-04

## 2022-03-04 RX ORDER — DEXTROAMPHETAMINE SACCHARATE, AMPHETAMINE ASPARTATE MONOHYDRATE, DEXTROAMPHETAMINE SULFATE AND AMPHETAMINE SULFATE 5; 5; 5; 5 MG/1; MG/1; MG/1; MG/1
20 CAPSULE, EXTENDED RELEASE ORAL DAILY
Qty: 30 CAPSULE | Refills: 0 | Status: SHIPPED | OUTPATIENT
Start: 2022-04-03 | End: 2022-03-04

## 2022-03-04 RX ORDER — DEXTROAMPHETAMINE SACCHARATE, AMPHETAMINE ASPARTATE, DEXTROAMPHETAMINE SULFATE AND AMPHETAMINE SULFATE 3.75; 3.75; 3.75; 3.75 MG/1; MG/1; MG/1; MG/1
TABLET ORAL
Qty: 30 TABLET | Refills: 0 | Status: SHIPPED | OUTPATIENT
Start: 2022-03-04 | End: 2022-03-04

## 2022-03-04 ASSESSMENT — ANXIETY QUESTIONNAIRES
5. BEING SO RESTLESS THAT IT IS HARD TO SIT STILL: NOT AT ALL
7. FEELING AFRAID AS IF SOMETHING AWFUL MIGHT HAPPEN: NOT AT ALL
3. WORRYING TOO MUCH ABOUT DIFFERENT THINGS: NOT AT ALL
2. NOT BEING ABLE TO STOP OR CONTROL WORRYING: NOT AT ALL
1. FEELING NERVOUS, ANXIOUS, OR ON EDGE: SEVERAL DAYS
6. BECOMING EASILY ANNOYED OR IRRITABLE: SEVERAL DAYS
GAD7 TOTAL SCORE: 4
IF YOU CHECKED OFF ANY PROBLEMS ON THIS QUESTIONNAIRE, HOW DIFFICULT HAVE THESE PROBLEMS MADE IT FOR YOU TO DO YOUR WORK, TAKE CARE OF THINGS AT HOME, OR GET ALONG WITH OTHER PEOPLE: SOMEWHAT DIFFICULT

## 2022-03-04 ASSESSMENT — PATIENT HEALTH QUESTIONNAIRE - PHQ9
5. POOR APPETITE OR OVEREATING: MORE THAN HALF THE DAYS
SUM OF ALL RESPONSES TO PHQ QUESTIONS 1-9: 5

## 2022-03-04 ASSESSMENT — PAIN SCALES - GENERAL: PAINLEVEL: NO PAIN (0)

## 2022-03-05 ASSESSMENT — ANXIETY QUESTIONNAIRES: GAD7 TOTAL SCORE: 4

## 2022-03-12 ENCOUNTER — MYC MEDICAL ADVICE (OUTPATIENT)
Dept: FAMILY MEDICINE | Facility: CLINIC | Age: 32
End: 2022-03-12
Payer: COMMERCIAL

## 2022-04-01 ENCOUNTER — MYC MEDICAL ADVICE (OUTPATIENT)
Dept: FAMILY MEDICINE | Facility: CLINIC | Age: 32
End: 2022-04-01
Payer: COMMERCIAL

## 2022-04-01 NOTE — TELEPHONE ENCOUNTER
Patient Quality Outreach    Patient is due for the following:   Depression  -  PHQ-9 Needed  Physical  - due now  Immunizations  -  Covid    NEXT STEPS:   Schedule a yearly physical   Complete PHQ9/KALANI on mychart for 12 month remission recheck    Type of outreach:    Sent [employer] message.      Questions for provider review:    None     Nuzhat Griffith, CMA

## 2022-04-02 ASSESSMENT — ANXIETY QUESTIONNAIRES
GAD7 TOTAL SCORE: 11
2. NOT BEING ABLE TO STOP OR CONTROL WORRYING: SEVERAL DAYS
5. BEING SO RESTLESS THAT IT IS HARD TO SIT STILL: SEVERAL DAYS
1. FEELING NERVOUS, ANXIOUS, OR ON EDGE: NEARLY EVERY DAY
GAD7 TOTAL SCORE: 11
7. FEELING AFRAID AS IF SOMETHING AWFUL MIGHT HAPPEN: NOT AT ALL
4. TROUBLE RELAXING: MORE THAN HALF THE DAYS
6. BECOMING EASILY ANNOYED OR IRRITABLE: MORE THAN HALF THE DAYS
7. FEELING AFRAID AS IF SOMETHING AWFUL MIGHT HAPPEN: NOT AT ALL
GAD7 TOTAL SCORE: 11
3. WORRYING TOO MUCH ABOUT DIFFERENT THINGS: MORE THAN HALF THE DAYS

## 2022-04-02 ASSESSMENT — PATIENT HEALTH QUESTIONNAIRE - PHQ9
SUM OF ALL RESPONSES TO PHQ QUESTIONS 1-9: 4
SUM OF ALL RESPONSES TO PHQ QUESTIONS 1-9: 4
10. IF YOU CHECKED OFF ANY PROBLEMS, HOW DIFFICULT HAVE THESE PROBLEMS MADE IT FOR YOU TO DO YOUR WORK, TAKE CARE OF THINGS AT HOME, OR GET ALONG WITH OTHER PEOPLE: VERY DIFFICULT

## 2022-04-03 ASSESSMENT — PATIENT HEALTH QUESTIONNAIRE - PHQ9: SUM OF ALL RESPONSES TO PHQ QUESTIONS 1-9: 4

## 2022-04-03 ASSESSMENT — ANXIETY QUESTIONNAIRES: GAD7 TOTAL SCORE: 11

## 2022-04-08 NOTE — TELEPHONE ENCOUNTER
PHQ9/KALANI completed and pt has read [employer] message. Closing encounter.    Nuzhat Griffith CMA ([facility])

## 2022-06-29 ENCOUNTER — MYC REFILL (OUTPATIENT)
Dept: FAMILY MEDICINE | Facility: CLINIC | Age: 32
End: 2022-06-29

## 2022-06-29 DIAGNOSIS — F90.2 ATTENTION DEFICIT HYPERACTIVITY DISORDER (ADHD), COMBINED TYPE: ICD-10-CM

## 2022-06-29 RX ORDER — DEXTROAMPHETAMINE SACCHARATE, AMPHETAMINE ASPARTATE MONOHYDRATE, DEXTROAMPHETAMINE SULFATE AND AMPHETAMINE SULFATE 5; 5; 5; 5 MG/1; MG/1; MG/1; MG/1
20 CAPSULE, EXTENDED RELEASE ORAL DAILY
Qty: 30 CAPSULE | Refills: 0 | Status: SHIPPED | OUTPATIENT
Start: 2022-06-29 | End: 2022-07-06

## 2022-06-29 RX ORDER — DEXTROAMPHETAMINE SACCHARATE, AMPHETAMINE ASPARTATE, DEXTROAMPHETAMINE SULFATE AND AMPHETAMINE SULFATE 3.75; 3.75; 3.75; 3.75 MG/1; MG/1; MG/1; MG/1
TABLET ORAL
Qty: 30 TABLET | Refills: 0 | Status: SHIPPED | OUTPATIENT
Start: 2022-06-29 | End: 2022-07-06

## 2022-06-29 NOTE — TELEPHONE ENCOUNTER
Pending Prescriptions:                       Disp   Refills    amphetamine-dextroamphetamine (ADDERALL XR*30 cap*0        Sig: Take 1 capsule (20 mg) by mouth daily    amphetamine-dextroamphetamine (ADDERALL) 1*30 tab*0        Sig: Take 1 capsule by mouth daily in the early afternoon        Routing refill request to provider for review/approval because:  Drug not on the [facility] refill protocol     Ion Horvath, MIRANDAN, RN, PHN  [facility] RN for [place]  June 29, 2022

## 2022-06-29 NOTE — TELEPHONE ENCOUNTER
Last visit with PCP 03/04/2022. Advised ok to refill for next 3mo. Refilled.   Criselda Ramirez PA-C

## 2022-07-06 ENCOUNTER — VIRTUAL VISIT (OUTPATIENT)
Dept: FAMILY MEDICINE | Facility: CLINIC | Age: 32
End: 2022-07-06
Payer: COMMERCIAL

## 2022-07-06 DIAGNOSIS — Z91.199 NO-SHOW FOR APPOINTMENT: Primary | ICD-10-CM

## 2022-07-06 RX ORDER — DEXTROAMPHETAMINE SACCHARATE, AMPHETAMINE ASPARTATE, DEXTROAMPHETAMINE SULFATE AND AMPHETAMINE SULFATE 3.75; 3.75; 3.75; 3.75 MG/1; MG/1; MG/1; MG/1
TABLET ORAL
Qty: 30 TABLET | Refills: 0 | Status: SHIPPED | OUTPATIENT
Start: 2022-08-28 | End: 2022-09-14

## 2022-07-06 RX ORDER — DEXTROAMPHETAMINE SACCHARATE, AMPHETAMINE ASPARTATE MONOHYDRATE, DEXTROAMPHETAMINE SULFATE AND AMPHETAMINE SULFATE 5; 5; 5; 5 MG/1; MG/1; MG/1; MG/1
20 CAPSULE, EXTENDED RELEASE ORAL DAILY
Qty: 30 CAPSULE | Refills: 0 | Status: SHIPPED | OUTPATIENT
Start: 2022-08-28 | End: 2022-09-14

## 2022-07-06 RX ORDER — DEXTROAMPHETAMINE SACCHARATE, AMPHETAMINE ASPARTATE, DEXTROAMPHETAMINE SULFATE AND AMPHETAMINE SULFATE 3.75; 3.75; 3.75; 3.75 MG/1; MG/1; MG/1; MG/1
TABLET ORAL
Qty: 30 TABLET | Refills: 0 | Status: SHIPPED | OUTPATIENT
Start: 2022-07-29 | End: 2022-07-06

## 2022-07-06 RX ORDER — DEXTROAMPHETAMINE SACCHARATE, AMPHETAMINE ASPARTATE MONOHYDRATE, DEXTROAMPHETAMINE SULFATE AND AMPHETAMINE SULFATE 5; 5; 5; 5 MG/1; MG/1; MG/1; MG/1
20 CAPSULE, EXTENDED RELEASE ORAL DAILY
Qty: 30 CAPSULE | Refills: 0 | Status: SHIPPED | OUTPATIENT
Start: 2022-07-29 | End: 2022-07-06

## 2022-07-06 NOTE — PROGRESS NOTES
Called cell phone x3 and went straight to voice mail. Mail box was full. No reply to messages sent via [employer] to join video visit. This patient was a no show for this scheduled appointment.    If in regards to his prozac and adderall Rx, he does not need a visit until September (6 months from his visit in March) unless he is having side effects.    Tanner Ferro MD  [facility]

## 2022-07-06 NOTE — PROGRESS NOTES
"Vernon is a 32 year old who is being evaluated via a billable video visit.      How would you like to obtain your AVS? MyChart  If the video visit is dropped, the invitation should be resent by: Text to cell phone: [phone]  Will anyone else be joining your video visit? No  {If patient encounters technical issues they should call [phone] :589223}        {PROVIDER CHARTING PREFERENCE:498994}    Subjective   Vernon is a 32 year old, presenting for the following health issues:  No chief complaint on file.      History of Present Illness       Reason for visit:  Medication check-in/refill    He eats 0-1 servings of fruits and vegetables daily.He consumes 2 sweetened beverage(s) daily.He exercises with enough effort to increase his heart rate 60 or more minutes per day.  He exercises with enough effort to increase his heart rate 5 days per week. He is missing 1 dose(s) of medications per week.  He is not taking prescribed medications regularly due to side effects and other.       {SUPERLIST (Optional):537457}  {additonal problems for provider to add (Optional):085403}    Review of Systems   {ROS COMP (Optional):861819}      Objective           Vitals:  No vitals were obtained today due to virtual visit.    Physical Exam   {video visit exam brief selected:380531::\"GENERAL: Healthy, alert and no distress\",\"EYES: Eyes grossly normal to inspection.  No discharge or erythema, or obvious scleral/conjunctival abnormalities.\",\"RESP: No audible wheeze, cough, or visible cyanosis.  No visible retractions or increased work of breathing.  \",\"SKIN: Visible skin clear. No significant rash, abnormal pigmentation or lesions.\",\"NEURO: Cranial nerves grossly intact.  Mentation and speech appropriate for age.\",\"PSYCH: Mentation appears normal, affect normal/bright, judgement and insight intact, normal speech and appearance well-groomed.\"}    {Diagnostic Test Results (Optional):756618}    {AMBULATORY ATTESTATION (Optional):302992}    " "    Video-Visit Details    Video Start Time: {video visit start/end time for provider to select:747542}    Type of service:  Video Visit    Video End Time:{video visit start/end time for provider to select:972365}    Originating Location (pt. Location): {video visit patient location:940835::\"Home\"}    Distant Location (provider location):  [facility] [place]     Platform used for Video Visit: {Virtual Visit Platforms:496255::\"[employer]"}    .  ..  "

## 2022-08-12 ENCOUNTER — E-VISIT (OUTPATIENT)
Dept: FAMILY MEDICINE | Facility: CLINIC | Age: 32
End: 2022-08-12
Payer: COMMERCIAL

## 2022-08-12 DIAGNOSIS — L30.9 HAND DERMATITIS: Primary | ICD-10-CM

## 2022-08-12 PROCEDURE — 99421 OL DIG E/M SVC 5-10 MIN: CPT | Performed by: FAMILY MEDICINE

## 2022-08-15 RX ORDER — TRIAMCINOLONE ACETONIDE 1 MG/G
OINTMENT TOPICAL
Qty: 15 G | Refills: 1 | Status: SHIPPED | OUTPATIENT
Start: 2022-08-15 | End: 2022-10-12

## 2022-08-15 NOTE — PATIENT INSTRUCTIONS
Thank you for choosing us for your care. I have placed an order for a prescription so that you can start treatment. View your full visit summary for details by clicking on the link below. Your pharmacist will able to address any questions you may have about the medication.     If you're not feeling better within 5-7 days, please schedule an appointment.  You can schedule an appointment right here in [facility], or call [phone]  If the visit is for the same symptoms as your eVisit, we'll refund the cost of your eVisit if seen within seven days.

## 2022-08-15 NOTE — TELEPHONE ENCOUNTER
Provider E-Visit time total (minutes): 2 min     Start time: 9:20 AM 8/15/2022     End time: 8/15/2022 9:22 AM    Refilled triamcinolone    Tanner Ferro MD  [facility]

## 2022-09-08 ENCOUNTER — OFFICE VISIT (OUTPATIENT)
Dept: FAMILY MEDICINE | Facility: CLINIC | Age: 32
End: 2022-09-08
Payer: COMMERCIAL

## 2022-09-08 VITALS
DIASTOLIC BLOOD PRESSURE: 72 MMHG | BODY MASS INDEX: 26.11 KG/M2 | HEART RATE: 88 BPM | OXYGEN SATURATION: 99 % | RESPIRATION RATE: 20 BRPM | HEIGHT: 75 IN | WEIGHT: 210 LBS | SYSTOLIC BLOOD PRESSURE: 110 MMHG

## 2022-09-08 DIAGNOSIS — S63.639A TRAUMATIC RUPTURE OF TENDON OF DISTAL INTERPHALANGEAL (DIP) JOINT OF FINGER, INITIAL ENCOUNTER: ICD-10-CM

## 2022-09-08 DIAGNOSIS — M79.645 FINGER PAIN, LEFT: Primary | ICD-10-CM

## 2022-09-08 PROCEDURE — 99213 OFFICE O/P EST LOW 20 MIN: CPT | Performed by: FAMILY MEDICINE

## 2022-09-08 ASSESSMENT — PAIN SCALES - GENERAL: PAINLEVEL: MILD PAIN (3)

## 2022-09-08 ASSESSMENT — PATIENT HEALTH QUESTIONNAIRE - PHQ9: SUM OF ALL RESPONSES TO PHQ QUESTIONS 1-9: 0

## 2022-09-08 NOTE — PATIENT INSTRUCTIONS
Please call [phone] to schedule your imaging study.     Number Of Freeze-Thaw Cycles: 1 freeze-thaw cycle Render Post-Care Instructions In Note?: yes Add 52 Modifier (Optional): no Medical Necessity Clause: This procedure was medically necessary because the lesions that were treated were: left chest Medical Necessity Information: It is in your best interest to select a reason for this procedure from the list below. All of these items fulfill various CMS LCD requirements except the new and changing color options. Detail Level: Zone Post-Care Instructions: I reviewed with the patient in detail post-care instructions. Patient is to wear sunprotection, and avoid picking at any of the treated lesions. Pt may apply Vaseline to crusted or scabbing areas. Consent: The patient's consent was obtained including but not limited to risks of crusting, scabbing, blistering, scarring, darker or lighter pigmentary change, recurrence, incomplete removal and infection.

## 2022-09-08 NOTE — PROGRESS NOTES
Assessment & Plan   1. Finger pain, left: X-ray unremarkable for avulsion fracture.  Patient's symptoms consistent with concern of jersey finger injury.  Recommend referral to orthopedics with hand specialty for further management.  Ongoing for approximately 1 month or more and therefore unlikely to be benefited by brace at this time.  MRI has been ordered to further evaluate for tendon rupture and surgical planning.  Patient agreeable plan.  - XR Finger Left G/E 2 Views; Future  - Orthopedic  Referral; Future  - MR Hand Left w/o Contrast; Future      Return if symptoms worsen or fail to improve.    Tanner Ferro MD  [facility]    This chart is completed utilizing dictation software; typos and/or incorrect word substitutions may unintentionally occur.       Du Junior is a 32 year old, presenting for the following health issues:  Finger (Pain/injury left pointer )    History of Present Illness       Reason for visit:  Finger injury  Symptom onset:  More than a month  Symptoms include:  Hard to bend/straighten, painful  Symptom intensity:  Moderate  Symptom progression:  Staying the same  Had these symptoms before:  No  What makes it worse:  Sleeping   What makes it better:  Movement     He eats 2-3 servings of fruits and vegetables daily.He consumes 3 sweetened beverage(s) daily.He exercises with enough effort to increase his heart rate 60 or more minutes per day.  He exercises with enough effort to increase his heart rate 5 days per week. He is missing 1 dose(s) of medications per week.     Presents with concerns of left index finger pain that has been ongoing for the past month. Patient reports falling at work with his hand outstretched, landing on a box, and hyperextending the fingers on his left hand.  Did not have significant pain immediately after injury. Pain and decreased ROM was first noted the day following the fall. Patient states that he has been  "unable to move finger without assistance and that pain is severe and intense initially when waking.  Stiffness and decreased ROM is most noticeable in the morning and requires frequent motion before it loosens up often times for multiple hours. Additionally endorses weakness in left index finger (cannot hold a dinner plate.).     Denies numbness, tingling, decreased sensation, swelling, and temperature difference in injured finger.     Review of Systems   CONSTITUTIONAL: NEGATIVE for fever, chills, change in weight  INTEGUMENTARY/SKIN: NEGATIVE for worrisome rashes, moles or lesions  ENT/MOUTH: NEGATIVE for ear, mouth and throat problems  RESP: NEGATIVE for significant cough or SOB  CV: NEGATIVE for chest pain, palpitations or peripheral edema  MUSCULOSKELETAL: POSITIVE  for injury to 2nd digit left hand and joint pain distal MCP and DIP joints on left hand  NEURO: POSITIVE for weakness 2nd digit left hand and NEGATIVE for numbness or tingling  and paresthesias.   ROS otherwise negative      Objective    /72   Pulse 88   Resp 20   Ht 1.905 m (6' 3\")   Wt 95.3 kg (210 lb)   SpO2 99%   BMI 26.25 kg/m    Body mass index is 26.25 kg/m .  Physical Exam  Constitutional:       Appearance: Normal appearance. He is not ill-appearing.   HENT:      Head: Normocephalic and atraumatic.   Cardiovascular:      Rate and Rhythm: Normal rate and regular rhythm.      Heart sounds: Normal heart sounds, S1 normal and S2 normal. No murmur heard.  Pulmonary:      Effort: Pulmonary effort is normal.      Breath sounds: Normal breath sounds. No wheezing.   Musculoskeletal:      Right wrist: Normal.      Left wrist: Normal. No swelling. Normal range of motion.      Right hand: Normal.      Left hand: Tenderness (PIP and MCP 2nd digit left hand) present. No swelling. Decreased range of motion (flexion of DIP joint index finger left hand). Decreased strength. Normal strength of finger abduction. Normal sensation. Normal capillary " refill.      Cervical back: Normal range of motion and neck supple.   Skin:     General: Skin is warm and dry.      Capillary Refill: Capillary refill takes less than 2 seconds.   Neurological:      General: No focal deficit present.      Mental Status: He is alert.      GCS: GCS eye subscore is 4. GCS verbal subscore is 5. GCS motor subscore is 6.      Sensory: Sensation is intact.      Motor: Weakness (2nd digit left hand DIP to MCP joints) present.   Psychiatric:         Attention and Perception: Attention normal.         Mood and Affect: Mood and affect normal.        Xray:    XR FINGER LEFT G/E 2 VIEWS 9/8/2022 2:28 PM     HISTORY: Finger pain, left     COMPARISON: None.                                                                      IMPRESSION: No fracture or dislocation. No degenerative or erosive  arthritic changes.

## 2022-09-14 ENCOUNTER — MYC REFILL (OUTPATIENT)
Dept: FAMILY MEDICINE | Facility: CLINIC | Age: 32
End: 2022-09-14

## 2022-09-14 DIAGNOSIS — F90.2 ATTENTION DEFICIT HYPERACTIVITY DISORDER (ADHD), COMBINED TYPE: ICD-10-CM

## 2022-09-14 RX ORDER — DEXTROAMPHETAMINE SACCHARATE, AMPHETAMINE ASPARTATE, DEXTROAMPHETAMINE SULFATE AND AMPHETAMINE SULFATE 3.75; 3.75; 3.75; 3.75 MG/1; MG/1; MG/1; MG/1
TABLET ORAL
Qty: 30 TABLET | Refills: 0 | Status: SHIPPED | OUTPATIENT
Start: 2022-09-14 | End: 2022-10-12

## 2022-09-14 RX ORDER — DEXTROAMPHETAMINE SACCHARATE, AMPHETAMINE ASPARTATE MONOHYDRATE, DEXTROAMPHETAMINE SULFATE AND AMPHETAMINE SULFATE 5; 5; 5; 5 MG/1; MG/1; MG/1; MG/1
20 CAPSULE, EXTENDED RELEASE ORAL DAILY
Qty: 30 CAPSULE | Refills: 0 | Status: SHIPPED | OUTPATIENT
Start: 2022-09-14 | End: 2022-10-12

## 2022-09-14 NOTE — TELEPHONE ENCOUNTER
mychart sent    51y M with no significant PMHx, presents to ED with c/o throat pain for several weeks and abd distention for a week. Pt states he was treated for sore throat by ENT. Took meds but throat is still hurting. Pt states the abd distention has been getting worse. Last stool was today. Denies f/c, nausea, vomiting, diarrhea, recent travels, SOB, and urinary problems. Notes retired from security company. 51y M with no significant PMHx, presents to ED with c/o throat pain for several weeks and abd distention for a week. Pt states he was treated for sore throat by ENT. Took meds but throat is still hurting. Pt states the abd distention has been getting worse. Last stool was today. Denies f/c, nausea, vomiting, diarrhea, chest pain, sob, abdominal pain, urinary symptoms, numbness/weakness/tingling, recent travel, sick contacts, social history.

## 2022-09-14 NOTE — TELEPHONE ENCOUNTER
Refilled for patient. Needs his 6 month follow-up visit prior to further refills. Schedule.    Tanner Ferro MD  [facility]

## 2022-09-14 NOTE — TELEPHONE ENCOUNTER
Pending Prescriptions:                       Disp   Refills    amphetamine-dextroamphetamine (ADDERALL XR*30 cap*0        Sig: Take 1 capsule (20 mg) by mouth daily    amphetamine-dextroamphetamine (ADDERALL) 1*30 tab*0        Sig: Take 1 capsule by mouth daily in the early afternoon    Routing refill request to provider for review/approval because:  Drug not on the [facility] refill protocol   Shelby Castaneda RN

## 2022-09-15 ENCOUNTER — MYC MEDICAL ADVICE (OUTPATIENT)
Dept: FAMILY MEDICINE | Facility: CLINIC | Age: 32
End: 2022-09-15

## 2022-09-15 NOTE — TELEPHONE ENCOUNTER
Juan Daniel Junior,     Your medication was sent over for gladis month refill. Your provider is requesting you schedule visit for your ADHD 6-month follow-up before future refills. I would schedule it now, tends to book out.      Take Care,  MIRANDA DoddN, RN  [facility]  September 15, 2022

## 2022-09-26 NOTE — TELEPHONE ENCOUNTER
DIAGNOSIS: Left index finger. inner tendon issue. increasing pain with movement.   APPOINTMENT DATE: 10/07/2022   NOTES STATUS DETAILS   OFFICE NOTE from referring provider Internal 09/08/2022 Dr Pool[facility]    OFFICE NOTE from other specialist N/A    DISCHARGE SUMMARY from hospital N/A    DISCHARGE REPORT from the ER N/A    OPERATIVE REPORT N/A    EMG report N/A    MEDICATION LIST N/A    MRI N/A    DEXA (osteoporosis/bone health) N/A    CT SCAN N/A    XRAYS (IMAGES & REPORTS) Internal 09/08/2022 LFT finger

## 2022-10-07 ENCOUNTER — OFFICE VISIT (OUTPATIENT)
Dept: ORTHOPEDICS | Facility: CLINIC | Age: 32
End: 2022-10-07
Payer: COMMERCIAL

## 2022-10-07 ENCOUNTER — PRE VISIT (OUTPATIENT)
Dept: ORTHOPEDICS | Facility: CLINIC | Age: 32
End: 2022-10-07

## 2022-10-07 DIAGNOSIS — S63.639A TRAUMATIC RUPTURE OF TENDON OF DISTAL INTERPHALANGEAL (DIP) JOINT OF FINGER, INITIAL ENCOUNTER: Primary | ICD-10-CM

## 2022-10-07 NOTE — LETTER
10/7/2022         RE: Baldemar Johnson  [address]  [place] [address]        Dear Colleague,    Thank you for referring your patient, Baldemar Johnson, to the [facility] [place]. Please see a copy of my visit note below.    Erroneous Encounter. Needs MRI and surgical consultation, per referral.         Again, thank you for allowing me to participate in the care of your patient.        Sincerely,        Baldemar Martinez, DO

## 2022-10-12 ENCOUNTER — OFFICE VISIT (OUTPATIENT)
Dept: FAMILY MEDICINE | Facility: CLINIC | Age: 32
End: 2022-10-12
Payer: COMMERCIAL

## 2022-10-12 VITALS
WEIGHT: 211 LBS | BODY MASS INDEX: 26.24 KG/M2 | HEIGHT: 75 IN | DIASTOLIC BLOOD PRESSURE: 80 MMHG | RESPIRATION RATE: 12 BRPM | OXYGEN SATURATION: 100 % | TEMPERATURE: 97.8 F | SYSTOLIC BLOOD PRESSURE: 123 MMHG | HEART RATE: 95 BPM

## 2022-10-12 DIAGNOSIS — F90.2 ATTENTION DEFICIT HYPERACTIVITY DISORDER (ADHD), COMBINED TYPE: Primary | ICD-10-CM

## 2022-10-12 DIAGNOSIS — F17.200 TOBACCO USE DISORDER: ICD-10-CM

## 2022-10-12 DIAGNOSIS — F33.1 MODERATE EPISODE OF RECURRENT MAJOR DEPRESSIVE DISORDER (H): ICD-10-CM

## 2022-10-12 PROCEDURE — 99214 OFFICE O/P EST MOD 30 MIN: CPT | Performed by: FAMILY MEDICINE

## 2022-10-12 RX ORDER — DEXTROAMPHETAMINE SACCHARATE, AMPHETAMINE ASPARTATE, DEXTROAMPHETAMINE SULFATE AND AMPHETAMINE SULFATE 3.75; 3.75; 3.75; 3.75 MG/1; MG/1; MG/1; MG/1
TABLET ORAL
Qty: 30 TABLET | Refills: 0 | Status: SHIPPED | OUTPATIENT
Start: 2022-12-13 | End: 2023-01-10

## 2022-10-12 RX ORDER — DEXTROAMPHETAMINE SACCHARATE, AMPHETAMINE ASPARTATE MONOHYDRATE, DEXTROAMPHETAMINE SULFATE AND AMPHETAMINE SULFATE 5; 5; 5; 5 MG/1; MG/1; MG/1; MG/1
20 CAPSULE, EXTENDED RELEASE ORAL DAILY
Qty: 30 CAPSULE | Refills: 0 | Status: SHIPPED | OUTPATIENT
Start: 2022-11-13 | End: 2022-10-12

## 2022-10-12 RX ORDER — DEXTROAMPHETAMINE SACCHARATE, AMPHETAMINE ASPARTATE MONOHYDRATE, DEXTROAMPHETAMINE SULFATE AND AMPHETAMINE SULFATE 5; 5; 5; 5 MG/1; MG/1; MG/1; MG/1
20 CAPSULE, EXTENDED RELEASE ORAL DAILY
Qty: 30 CAPSULE | Refills: 0 | Status: SHIPPED | OUTPATIENT
Start: 2022-12-13 | End: 2023-01-10

## 2022-10-12 RX ORDER — DEXTROAMPHETAMINE SACCHARATE, AMPHETAMINE ASPARTATE MONOHYDRATE, DEXTROAMPHETAMINE SULFATE AND AMPHETAMINE SULFATE 5; 5; 5; 5 MG/1; MG/1; MG/1; MG/1
20 CAPSULE, EXTENDED RELEASE ORAL DAILY
Qty: 30 CAPSULE | Refills: 0 | Status: SHIPPED | OUTPATIENT
Start: 2022-10-14 | End: 2022-10-12

## 2022-10-12 RX ORDER — DEXTROAMPHETAMINE SACCHARATE, AMPHETAMINE ASPARTATE, DEXTROAMPHETAMINE SULFATE AND AMPHETAMINE SULFATE 3.75; 3.75; 3.75; 3.75 MG/1; MG/1; MG/1; MG/1
TABLET ORAL
Qty: 30 TABLET | Refills: 0 | Status: SHIPPED | OUTPATIENT
Start: 2022-10-14 | End: 2022-10-12

## 2022-10-12 RX ORDER — DEXTROAMPHETAMINE SACCHARATE, AMPHETAMINE ASPARTATE, DEXTROAMPHETAMINE SULFATE AND AMPHETAMINE SULFATE 3.75; 3.75; 3.75; 3.75 MG/1; MG/1; MG/1; MG/1
TABLET ORAL
Qty: 30 TABLET | Refills: 0 | Status: SHIPPED | OUTPATIENT
Start: 2022-11-13 | End: 2022-10-12

## 2022-10-12 ASSESSMENT — PAIN SCALES - GENERAL: PAINLEVEL: NO PAIN (0)

## 2022-10-12 NOTE — PROGRESS NOTES
Assessment & Plan   1. Attention deficit hyperactivity disorder (ADHD), combined type: Controlled.  PDMP reviewed.  Refills given.  May have 6-month supply.  - amphetamine-dextroamphetamine (ADDERALL XR) 20 MG 24 hr capsule; Take 1 capsule (20 mg) by mouth daily  Dispense: 30 capsule; Refill: 0  - amphetamine-dextroamphetamine (ADDERALL) 15 MG tablet; Take 1 capsule by mouth daily in the early afternoon  Dispense: 30 tablet; Refill: 0    2. Moderate episode of recurrent major depressive disorder (H): Discussed.  Would like to trial off Prozac.  Can discontinue as he is only on 20 mg and this should self wean him off.  Follow-up within 1 month if new or worsening symptoms.    3. Tobacco use disorder: Discouraged vaping.  We will work on this when he is less going on.  Contemplative phase of change.      Return in about 6 months (around 4/12/2023) for ADHD follow-up.    Tanner Ferro MD  [facility]    This chart is completed utilizing dictation software; typos and/or incorrect word substitutions may unintentionally occur.      Du Junior is a 32 year old accompanied by his self, presenting for the following health issues:  ADHD Follow up    History of Present Illness       Reason for visit:  6 month follow up for adhd    He eats 2-3 servings of fruits and vegetables daily.He consumes 2 sweetened beverage(s) daily.He exercises with enough effort to increase his heart rate 60 or more minutes per day.  He exercises with enough effort to increase his heart rate 6 days per week. He is missing 1 dose(s) of medications per week.     Patient is here today to follow-up on ADHD medication.  He denies any side effects such as palpitations, suppressed appetite, grinding teeth, jitteriness, anxiousness, or other noticeable side effects.    Weight is stable.    Stressors include transitioning jobs for [employer] from the [place] location to the [place] location.    He is no longer  "with his wife is wondering if he can go off his Prozac medication.  This was started 1 year ago.  He is not sure it is helping at all.    He has cut back on his tobacco use but continues to vape.    Has no other questions or concerns at this time.    Is still waiting to be seen by orthopedics for his finger injury.    Review of Systems   Constitutional, neuro, psych, cardiovascular, pulmonary, gi and gu systems are negative, except as otherwise noted.      Objective    /80   Pulse 95   Temp 97.8  F (36.6  C) (Temporal)   Resp 12   Ht 1.9 m (6' 2.8\")   Wt 95.7 kg (211 lb)   SpO2 100%   BMI 26.51 kg/m    Body mass index is 26.51 kg/m .  Physical Exam   General: Appears well and in no acute distress.  Cardiovascular: Regular rate and rhythm, normal S1 and S2 without murmur. No extra heartsounds or friction rub. Radial pulses present and equal bilaterally.  Respiratory: Lungs clear to auscultation bilaterally. No wheezing or crackles. No prolonged expiration. Symmetrical chest rise.    Labs: none            "

## 2022-11-02 ENCOUNTER — ANCILLARY PROCEDURE (OUTPATIENT)
Dept: MRI IMAGING | Facility: CLINIC | Age: 32
End: 2022-11-02
Attending: FAMILY MEDICINE
Payer: COMMERCIAL

## 2022-11-02 DIAGNOSIS — M79.645 FINGER PAIN, LEFT: ICD-10-CM

## 2022-11-02 PROCEDURE — 73218 MRI UPPER EXTREMITY W/O DYE: CPT | Mod: LT | Performed by: RADIOLOGY

## 2022-11-02 NOTE — RESULT ENCOUNTER NOTE
Please inform of results if patient has not viewed in [facility] within 3 business days.    Your MRI does show a high grade tear of the tendon that flexes your finger in the second digit of the left hand as suspected.    Continue with seeing ortho as scheduled.    Please call the clinic with any questions you may have.     Have a great day,    Dr. Pool

## 2022-11-18 ENCOUNTER — OFFICE VISIT (OUTPATIENT)
Dept: ORTHOPEDICS | Facility: CLINIC | Age: 32
End: 2022-11-18
Payer: COMMERCIAL

## 2022-11-18 ENCOUNTER — TELEPHONE (OUTPATIENT)
Dept: ORTHOPEDICS | Facility: CLINIC | Age: 32
End: 2022-11-18

## 2022-11-18 ENCOUNTER — THERAPY VISIT (OUTPATIENT)
Dept: OCCUPATIONAL THERAPY | Facility: CLINIC | Age: 32
End: 2022-11-18
Payer: COMMERCIAL

## 2022-11-18 DIAGNOSIS — S66.812A FLEXOR TENDON RUPTURE OF HAND, LEFT, INITIAL ENCOUNTER: Primary | ICD-10-CM

## 2022-11-18 DIAGNOSIS — M79.645 PAIN OF FINGER OF LEFT HAND: ICD-10-CM

## 2022-11-18 PROCEDURE — 99204 OFFICE O/P NEW MOD 45 MIN: CPT | Performed by: STUDENT IN AN ORGANIZED HEALTH CARE EDUCATION/TRAINING PROGRAM

## 2022-11-18 PROCEDURE — 97165 OT EVAL LOW COMPLEX 30 MIN: CPT | Mod: GO

## 2022-11-18 PROCEDURE — 97760 ORTHOTIC MGMT&TRAING 1ST ENC: CPT | Mod: GO

## 2022-11-18 NOTE — TELEPHONE ENCOUNTER
Procedure:  Left index finger exploration, flexor digitorum profundus tendon repair vs reconstruction with use of ipsilateral palmaris longus or split flexor carpi radialis tendon, possible sushil polina placement, possible use of bone anchor  Facility: [facility]  Length: 120 minutes  Anesthesia: Local, MAC  Post-op appointments needed: OT 3-5 days post op, 2 weeks provider only, 6 weeks with provider only.  Surgery packet/instructions given to patient?  Yes     Alexey Harrington RN

## 2022-11-18 NOTE — LETTER
11/18/2022         RE: Baldemar Johnson  [address]  [place] [address]        Dear Colleague,    Thank you for referring your patient, Baldemar Johnson, to the [facility]. Please see a copy of my visit note below.    Ortho Hand    HPI: 32 year old LHD S p/w L IF injury. He injured the L IF a few months ago. Since then, he has never gotten better. The mechanism was a force finger hyperextension injury. He can make a fist but there is discomfort over the volar aspect of the finger. No other injuries.     ROS: Negative, see HPI  PMH: None, nondiabetic  PSH: No prior surgeries to the hands or wrists  Medications: No blood thinners  Allergies: None  SH: Used to smoke 0.5 PPD, but no longer smokes and is trying to vape only.   FH: No bleeding or clotting issues, or problems with anesthesia    Examination:  Nonlabored breathing  Not distressed  Can make a full L composite fist with intact isolated L IF FDP and FDS function, but FDP excursion is weaker    XR: No fracture or dislocation  MR: High-grade L IF FDP tear at the mid/distal aspect of P1    A/P: 32 year old LHD NS p/w L IF FDP injury    -Despite that the patient has full L IF flexor excursion, he has an injury to the FDP tendon and this may be either near-total or full with pseudotendon maintaining functioning. In either case, my recommendation is to explore the finger and plan for repair/reconstruction. We discussed the possibility of staged Jalen polina reconstruction if needed. Patient understands all of this would like to proceed with surgery.   -Discussed the risks of surgery, including but not limited to: infection, bleeding, pain, poor scarring, stiffness, injury to nerves or tendons, neuroma formation, need for revision surgery, complex regional pain syndrome. Despite these risks, patient consents to surgery.   -MAC with local  -Counseled patient to stop all nicotine products for 4-6 weeks preoperatively and 4 weeks  postoperatively. Patient is agreeable.   -Case request placed  -A total of 45 minutes was devoted to review of chart, direct face-to-face patient counseling and documentation during this encounter, exclusive of any procedure performed.    Mandeep Lenz MD, PhD           Again, thank you for allowing me to participate in the care of your patient.        Sincerely,        Mandeep Lenz MD

## 2022-11-18 NOTE — NURSING NOTE
Pre-Operative Teaching Flowsheet     Person(s) involved in teaching: Patient     Motivation Level:  Receptive (willing/able to accept information) and asks appropriate questions where applicable: Yes  Any cultural factors/Faith beliefs that may influence understanding or compliance? No     Patient demonstrates understanding of the following:  Pre-operative planning, including the necessary appointments and preparation needed prior to surgery: Yes  Which situations necessitate calling provider and whom to contact: Yes  Pain management techniques pre and post op: Yes  Stoplight tool introduced, questions answered, patient expressed understanding: Yes  How, and when, to access community resources: Yes    Additional Teaching Concerns Addressed:  Post-operative living arrangements and necessary adaptations to living environment.  Instructional Materials Used/Given: Yes, pre-op packet given to patient with additional system forms added as needed depending on type of surgery. Pre-op soap given (if in clinic).     Time spent with patient: 20 minutes.    Alexey Harrington RN

## 2022-11-18 NOTE — PROGRESS NOTES
Ortho Hand    HPI: 32 year old LHD S p/w L IF injury. He injured the L IF a few months ago. Since then, he has never gotten better. The mechanism was a force finger hyperextension injury. He can make a fist but there is discomfort over the volar aspect of the finger. No other injuries.     ROS: Negative, see HPI  PMH: None, nondiabetic  PSH: No prior surgeries to the hands or wrists  Medications: No blood thinners  Allergies: None  SH: Used to smoke 0.5 PPD, but no longer smokes and is trying to vape only.   FH: No bleeding or clotting issues, or problems with anesthesia    Examination:  Nonlabored breathing  Not distressed  Can make a full L composite fist with intact isolated L IF FDP and FDS function, but FDP excursion is weaker    XR: No fracture or dislocation  MR: High-grade L IF FDP tear at the mid/distal aspect of P1    A/P: 32 year old LHD NS p/w L IF FDP injury    -Despite that the patient has full L IF flexor excursion, he has an injury to the FDP tendon and this may be either near-total or full with pseudotendon maintaining functioning. In either case, my recommendation is to explore the finger and plan for repair/reconstruction. We discussed the possibility of staged Jalen polina reconstruction if needed. Patient understands all of this would like to proceed with surgery.   -Discussed the risks of surgery, including but not limited to: infection, bleeding, pain, poor scarring, stiffness, injury to nerves or tendons, neuroma formation, need for revision surgery, complex regional pain syndrome. Despite these risks, patient consents to surgery.   -MAC with local  -Counseled patient to stop all nicotine products for 4-6 weeks preoperatively and 4 weeks postoperatively. Patient is agreeable.   -Case request placed  -A total of 45 minutes was devoted to review of chart, direct face-to-face patient counseling and documentation during this encounter, exclusive of any procedure performed.    Mandeep Lenz MD,  PhD

## 2022-11-18 NOTE — PROGRESS NOTES
[facility]    OUTPATIENT Occupational Therapy ORTHOPEDIC EVALUATION  PLAN OF TREATMENT FOR OUTPATIENT REHABILITATION  (COMPLETE FOR INITIAL CLAIMS ONLY)  Patient's Last Name, First Name, M.I.  YOB: 1990  Baldemar Johnson    Provider s Name:  [facility]   Medical Record No.  [id number]   Start of Care Date:  11/18/22   Onset Date:   08/01/22   Treatment Diagnosis:  Left flexor tendon rupture of hand Medical Diagnosis:  Data Unavailable       Goals:     11/18/22 0500   Goal #1   Goal #1 dressing   Previous Performance Level Independent   Current Functional Task Don   Current Performance Level Unable   STG Target Performance Other - on additional line   Other Dressing Splint   STG Target Perform Level Able   Due Date  11/18/22   Date Goal Met 11/18/22   LTG Target Task/Performance Independent   Due date 11/18/22   Date Goal Met 11/18/22       Therapy Frequency:  2x monthly  Predicted Duration of Therapy Intervention:  1 month    Latrice Siddiqui OT                 I CERTIFY THE NEED FOR THESE SERVICES FURNISHED UNDER        THIS PLAN OF TREATMENT AND WHILE UNDER MY CARE     (Physician attestation of this document indicates review and certification of the therapy plan).                     Certification Date From:  11/18/22   Certification Date To:  12/16/22    Referring Provider:  Mandeep Lenz    Initial Assessment        See Epic Evaluation SOC Date: 11/18/22

## 2022-11-18 NOTE — TELEPHONE ENCOUNTER
Date Scheduled: 1-5-23  Facility: [facility] [place]  Surgeon: Dr. Lenz   Post-op appointment scheduled:    scheduled?: No  Surgery packet/instructions confirmed received?  Yes  Special Considerations:     Patient is willing to move up if we get any openings. Patient works nights,so he sleeps in the mornings and early afternoons.      Laura Umanzor, Surgery Scheduling Coordinator

## 2022-11-18 NOTE — PROGRESS NOTES
Hand Therapy Initial Evaluation    Current Date:  11/18/2022  Referring Provider: Mandeep Lenz MD MD Order Date: 11/18/2022  MD Note: Needs left forearm-based dorsal block splint that include index and middle fingers, flexed MP, PIP and DIP joints, to protect IF flexor tendons and to be used for postop flexor rehab    Diagnosis: Flexor tendon rupture of hand, left  DOI: August 2022    Subjective:  Baldemar Johnson is a 32 year old male.    Patient reports symptoms of the left hand which occurred due to falling at work. Since onset symptoms are Unchanged  General health as reported by patient is good.  Pertinent medical history includes:Numbness/Tingling, Smoking  Medical allergies:none.  Surgical history: none.  Medication history: None.    Current occupation is Assistant Manage at [employer]  Job Tasks: Lifting, Carrying, Prolonged Standing, Repetitive Tasks    Occupational Profile Information:  Left hand dominant  Prior functional level:  independent-shared household chores  Patient reports symptoms of pain, stiffness/loss of motion, weakness/loss of strength and edema  Special tests:  x-ray and MRI.    Previous treatment: none  Transportation: relies on friend for transportation  Currently working in normal job without restrictions    Functional Outcome Measure:   Upper Extremity Functional Index Score:  SCORE:   Column Totals: /80: 58   (A lower score indicates greater disability.)    Objective:  Pain Level (Scale 0-10)   11/18/2022   At Rest 1/10   With Use 8/10     Pain Description  Date 11/18/2022   Location Volar index finger and palm    Pain Quality Sharp and Shooting   Frequency constant or daily     Pain is worst daytime   Exacerbated by Use   Relieved by none   Progression Unchanged     Edema  Mild    Sensation   WNL throughout all nerve distributions; per patient report    ROM   DPC: 1    Strength  Contraindicated at this time.     Assessment:  Patient presents with symptoms consistent with diagnosis of  left flexor tendon rupture of hand, with conservative intervention.  Surgery is scheduled for January 2023.    Patient's limitations or Problem List includes:  Pain, Decreased ROM/motion, Weakness and Tightness in musculature of the left hand which interferes with the patient's ability to perform Self Care Tasks (dressing), Work Tasks and Household Chores as compared to previous level of function.    Rehab Potential:  Good - Return to full activity, some limitations    Patient will benefit from skilled Occupational Therapy to increase ROM and stability of wrist and decrease pain to return to previous activity level and resume normal daily tasks and to reach their rehab potential.    Barriers to Learning:  No barrier    Communication Issues:  Patient appears to be able to clearly communicate and understand verbal and written communication and follow directions correctly.    Chart Review: Chart Review and Simple history review with patient    Identified Performance Deficits: dressing, health management and maintenance, home establishment and management, meal preparation and cleanup and work    Assessment of Occupational Performance:  3-5 Performance Deficits    Clinical Decision Making (Complexity): Low complexity    Treatment Explanation:  The following has been discussed with the patient:  RX ordered/plan of care  Anticipated outcomes  Possible risks and side effects    Plan:  Frequency:  2 X a month, once daily  Duration:  for 1 month    Treatment Plan:   Orthotic Fabrication:  Static    Home Exercise Program:   Wear orthosis at all times. Can wash with warm, soapy water.    Next Appointment:   Adjust orthosis

## 2022-12-16 ENCOUNTER — OFFICE VISIT (OUTPATIENT)
Dept: FAMILY MEDICINE | Facility: CLINIC | Age: 32
End: 2022-12-16
Payer: COMMERCIAL

## 2022-12-16 VITALS
OXYGEN SATURATION: 98 % | TEMPERATURE: 97.7 F | HEIGHT: 74 IN | SYSTOLIC BLOOD PRESSURE: 121 MMHG | HEART RATE: 87 BPM | BODY MASS INDEX: 26.5 KG/M2 | DIASTOLIC BLOOD PRESSURE: 82 MMHG | WEIGHT: 206.5 LBS

## 2022-12-16 DIAGNOSIS — F90.2 ATTENTION DEFICIT HYPERACTIVITY DISORDER (ADHD), COMBINED TYPE: ICD-10-CM

## 2022-12-16 DIAGNOSIS — S66.812S: ICD-10-CM

## 2022-12-16 DIAGNOSIS — Z01.818 PRE-OPERATIVE EXAMINATION: Primary | ICD-10-CM

## 2022-12-16 LAB
ERYTHROCYTE [DISTWIDTH] IN BLOOD BY AUTOMATED COUNT: 12.3 % (ref 10–15)
HCT VFR BLD AUTO: 43.4 % (ref 40–53)
HGB BLD-MCNC: 14.6 G/DL (ref 13.3–17.7)
MCH RBC QN AUTO: 29.7 PG (ref 26.5–33)
MCHC RBC AUTO-ENTMCNC: 33.6 G/DL (ref 31.5–36.5)
MCV RBC AUTO: 88 FL (ref 78–100)
PLATELET # BLD AUTO: 230 10E3/UL (ref 150–450)
RBC # BLD AUTO: 4.91 10E6/UL (ref 4.4–5.9)
WBC # BLD AUTO: 5.9 10E3/UL (ref 4–11)

## 2022-12-16 PROCEDURE — 36415 COLL VENOUS BLD VENIPUNCTURE: CPT | Performed by: FAMILY MEDICINE

## 2022-12-16 PROCEDURE — 99214 OFFICE O/P EST MOD 30 MIN: CPT | Performed by: FAMILY MEDICINE

## 2022-12-16 PROCEDURE — 85027 COMPLETE CBC AUTOMATED: CPT | Performed by: FAMILY MEDICINE

## 2022-12-16 ASSESSMENT — PAIN SCALES - GENERAL: PAINLEVEL: NO PAIN (0)

## 2022-12-16 NOTE — RESULT ENCOUNTER NOTE
Please inform of results if patient has not viewed in [facility] within 3 business days.    CBC Results - Your cell counts were normal.    Please call the clinic with any questions you may have.     Have a great day,    Dr. Pool

## 2022-12-16 NOTE — PROGRESS NOTES
[facility]  [address], [address]  [place] [address]  Phone: [phone]  Fax: [phone]  Primary Provider: Hussein Wade  Pre-op Performing Provider: HUSSEIN WADE      PREOPERATIVE EVALUATION:  Today's date: 12/16/2022    Baldemar Johnson is a 32 year old male who presents for a preoperative evaluation.    Surgical Information:  Surgery/Procedure: LEFT INDEX FINGER EXPLORATION, FLEXOR DIGITORUM PROFUNDUS TENDON REPAIR VERSUS RECONSTRUCTION WITH USE OF IPSILATERAL PALMARIS LONGUS OR SPLIT FLEXOR CARPI RADIALIS TENDON, POSSIBLE LAZ SHANICE PLACEMENT, POSSIBLE USE OF BONE ANCHOR  Surgery Location: [facility]  Surgeon: Mandeep Lenz MD  Surgery Date: 1/5/23  Time of Surgery: 1:40pm   Where patient plans to recover: At home with family  Fax number for surgical facility: Note does not need to be faxed, will be available electronically in Epic.    Type of Anesthesia Anticipated: MAC with local    Assessment & Plan     The proposed surgical procedure is considered INTERMEDIATE risk.    Assessment & Plan   1. Pre-operative examination: Medically optimized for proposed procedure.  May proceed as planned.  Up-to-date on tetanus.  Chronic conditions controlled.  Able to tolerate 4 METS.  Covid testing through surgeon's office.  - CBC with platelets; Future    2. Flexor tendon rupture of hand, left, sequela: Plan for procedure above.  - CBC with platelets; Future    3. Attention deficit hyperactivity disorder (ADHD), combined type: Hold Adderall day of procedure.    4. Tobacco use disorder: Resolved.      Hussein Wade MD  [facility]    This chart is completed utilizing dictation software; typos and/or incorrect word substitutions may unintentionally occur.       Risks and Recommendations:  The patient has the following additional risks and recommendations for perioperative  complications:   - No identified additional risk factors other than previously addressed    Medication Instructions:  Patient is to take all scheduled medications on the day of surgery EXCEPT for modifications listed below:   - Psychostimulants: Hold the day of surgery    RECOMMENDATION:  APPROVAL GIVEN to proceed with proposed procedure, without further diagnostic evaluation.    Subjective     HPI related to upcoming procedure: History of left index finger injury a few months ago from forced finger hyperextension.  Developed tendon injury.  Plan for procedure above.    States quit smoking since 12/2/2022.    Preop Questions 12/16/2022   1. Have you ever had a heart attack or stroke? No   2. Have you ever had surgery on your heart or blood vessels, such as a stent placement, a coronary artery bypass, or surgery on an artery in your head, neck, heart, or legs? No   3. Do you have chest pain with activity? No   4. Do you have a history of  heart failure? No   5. Do you currently have a cold, bronchitis or symptoms of other infection? No   6. Do you have a cough, shortness of breath, or wheezing? No   7. Do you or anyone in your family have previous history of blood clots? No   8. Do you or does anyone in your family have a serious bleeding problem such as prolonged bleeding following surgeries or cuts? No   9. Have you ever had problems with anemia or been told to take iron pills? No   10. Have you had any abnormal blood loss such as black, tarry or bloody stools? No   11. Have you ever had a blood transfusion? No   12. Are you willing to have a blood transfusion if it is medically needed before, during, or after your surgery? Yes   13. Have you or any of your relatives ever had problems with anesthesia? No   14. Do you have sleep apnea, excessive snoring or daytime drowsiness? No   15. Do you have any artifical heart valves or other implanted medical devices like a pacemaker, defibrillator, or continuous glucose  monitor? No   16. Do you have artificial joints? No   17. Are you allergic to latex? No     Health Care Directive:  Patient does not have a Health Care Directive or Living Will: Emir Johnson - Father - [phone]    Preoperative Review of :   reviewed - controlled substances reflected in medication list.    ADHD - on Adderall    Review of Systems  Constitutional, neuro, ENT, endocrine, pulmonary, cardiac, gastrointestinal, genitourinary, musculoskeletal, integument and psychiatric systems are negative, except as otherwise noted.    Patient Active Problem List    Diagnosis Date Noted     Flexor tendon rupture of hand, left, initial encounter 11/18/2022     Priority: Medium     Added automatically from request for surgery [id number]       Pain of finger of left hand 11/18/2022     Priority: Medium     Numbness and tingling in right hand 07/15/2019     Priority: Medium     Controlled substance agreement signed 09/19/2018     Priority: Medium     Acne vulgaris 09/08/2016     Priority: Medium     Moderate episode of recurrent major depressive disorder (H) 07/12/2016     Priority: Medium     Attention deficit hyperactivity disorder (ADHD), combined type 07/12/2016     Priority: Medium     Tobacco use disorder 07/12/2016     Priority: Medium      Past Medical History:   Diagnosis Date     ADHD      Depressive disorder      History reviewed. No pertinent surgical history.  Current Outpatient Medications   Medication Sig Dispense Refill     amphetamine-dextroamphetamine (ADDERALL XR) 20 MG 24 hr capsule Take 1 capsule (20 mg) by mouth daily 30 capsule 0     amphetamine-dextroamphetamine (ADDERALL) 15 MG tablet Take 1 capsule by mouth daily in the early afternoon 30 tablet 0     Allergies   Allergen Reactions     Effexor [Venlafaxine] Diarrhea      Social History     Tobacco Use     Smoking status: Former     Packs/day: 0.00     Years: 10.00     Pack years: 0.00     Types: Cigarettes     Quit date: 12/2/2022     Years since  "quittin.0     Smokeless tobacco: Never   Substance Use Topics     Alcohol use: Not Currently     Comment: Very rare occasion     Family History   Problem Relation Age of Onset     Mental Illness Father      History   Drug Use No         Objective     /82 (BP Location: Left arm, Patient Position: Chair, Cuff Size: Adult Regular)   Pulse 87   Temp 97.7  F (36.5  C) (Temporal)   Ht 1.885 m (6' 2.21\")   Wt 93.7 kg (206 lb 8 oz)   SpO2 98%   BMI 26.36 kg/m      Physical Exam    GENERAL APPEARANCE: healthy, alert and no distress     EYES: EOMI,  PERRL     HENT: ear canals and TM's normal and nose and mouth without ulcers or lesions     NECK: no adenopathy, no asymmetry, masses, or scars and thyroid normal to palpation     RESP: lungs clear to auscultation - no rales, rhonchi or wheezes     CV: regular rates and rhythm, normal S1 S2, no S3 or S4 and no murmur, click or rub     ABDOMEN:  soft, nontender, no HSM or masses and bowel sounds normal     MS: extremities normal- no gross deformities noted other than left index finger.  Otherwise, no evidence of inflammation in joints, FROM in all extremities.     SKIN: no suspicious lesions or rashes     NEURO: Normal strength and tone, sensory exam grossly normal, mentation intact and speech normal     PSYCH: mentation appears normal. and affect normal/bright     LYMPHATICS: No cervical adenopathy    Diagnostics:  Recent Results (from the past 24 hour(s))   CBC with platelets    Collection Time: 22  2:50 PM   Result Value Ref Range    WBC Count 5.9 4.0 - 11.0 10e3/uL    RBC Count 4.91 4.40 - 5.90 10e6/uL    Hemoglobin 14.6 13.3 - 17.7 g/dL    Hematocrit 43.4 40.0 - 53.0 %    MCV 88 78 - 100 fL    MCH 29.7 26.5 - 33.0 pg    MCHC 33.6 31.5 - 36.5 g/dL    RDW 12.3 10.0 - 15.0 %    Platelet Count 230 150 - 450 10e3/uL      No EKG required, no history of coronary heart disease, significant arrhythmia, peripheral arterial disease or other structural heart " disease.    Revised Cardiac Risk Index (RCRI):  The patient has the following serious cardiovascular risks for perioperative complications:   - No serious cardiac risks = 0 points     RCRI Interpretation: 0 points: Class I (very low risk - 0.4% complication rate)       Signed Electronically by: Tanner Ferro MD  Copy of this evaluation report is provided to requesting physician.

## 2023-01-04 ENCOUNTER — ANESTHESIA EVENT (OUTPATIENT)
Dept: SURGERY | Facility: AMBULATORY SURGERY CENTER | Age: 33
End: 2023-01-04

## 2023-01-04 RX ORDER — SODIUM CHLORIDE, SODIUM LACTATE, POTASSIUM CHLORIDE, CALCIUM CHLORIDE 600; 310; 30; 20 MG/100ML; MG/100ML; MG/100ML; MG/100ML
INJECTION, SOLUTION INTRAVENOUS CONTINUOUS
Status: CANCELLED | OUTPATIENT
Start: 2023-01-04

## 2023-01-04 RX ORDER — OXYCODONE HYDROCHLORIDE 5 MG/1
10 TABLET ORAL EVERY 4 HOURS PRN
Status: CANCELLED | OUTPATIENT
Start: 2023-01-04

## 2023-01-04 RX ORDER — MEPERIDINE HYDROCHLORIDE 25 MG/ML
12.5 INJECTION INTRAMUSCULAR; INTRAVENOUS; SUBCUTANEOUS
Status: CANCELLED | OUTPATIENT
Start: 2023-01-04

## 2023-01-04 RX ORDER — HYDROMORPHONE HYDROCHLORIDE 1 MG/ML
0.4 INJECTION, SOLUTION INTRAMUSCULAR; INTRAVENOUS; SUBCUTANEOUS EVERY 5 MIN PRN
Status: CANCELLED | OUTPATIENT
Start: 2023-01-04

## 2023-01-04 RX ORDER — FENTANYL CITRATE 50 UG/ML
50 INJECTION, SOLUTION INTRAMUSCULAR; INTRAVENOUS EVERY 5 MIN PRN
Status: CANCELLED | OUTPATIENT
Start: 2023-01-04

## 2023-01-04 RX ORDER — HYDROMORPHONE HYDROCHLORIDE 1 MG/ML
0.2 INJECTION, SOLUTION INTRAMUSCULAR; INTRAVENOUS; SUBCUTANEOUS EVERY 5 MIN PRN
Status: CANCELLED | OUTPATIENT
Start: 2023-01-04

## 2023-01-04 RX ORDER — OXYCODONE HYDROCHLORIDE 5 MG/1
5 TABLET ORAL EVERY 4 HOURS PRN
Status: CANCELLED | OUTPATIENT
Start: 2023-01-04

## 2023-01-04 RX ORDER — ONDANSETRON 4 MG/1
4 TABLET, ORALLY DISINTEGRATING ORAL EVERY 30 MIN PRN
Status: CANCELLED | OUTPATIENT
Start: 2023-01-04

## 2023-01-04 RX ORDER — FENTANYL CITRATE 50 UG/ML
25 INJECTION, SOLUTION INTRAMUSCULAR; INTRAVENOUS EVERY 5 MIN PRN
Status: CANCELLED | OUTPATIENT
Start: 2023-01-04

## 2023-01-04 RX ORDER — ONDANSETRON 2 MG/ML
4 INJECTION INTRAMUSCULAR; INTRAVENOUS EVERY 30 MIN PRN
Status: CANCELLED | OUTPATIENT
Start: 2023-01-04

## 2023-01-04 RX ORDER — FENTANYL CITRATE 50 UG/ML
25 INJECTION, SOLUTION INTRAMUSCULAR; INTRAVENOUS
Status: CANCELLED | OUTPATIENT
Start: 2023-01-04

## 2023-01-05 ENCOUNTER — ANESTHESIA (OUTPATIENT)
Dept: SURGERY | Facility: AMBULATORY SURGERY CENTER | Age: 33
End: 2023-01-05

## 2023-01-05 ENCOUNTER — HOSPITAL ENCOUNTER (OUTPATIENT)
Facility: AMBULATORY SURGERY CENTER | Age: 33
Discharge: HOME | End: 2023-01-05
Attending: STUDENT IN AN ORGANIZED HEALTH CARE EDUCATION/TRAINING PROGRAM
Payer: COMMERCIAL

## 2023-01-05 VITALS
HEIGHT: 74 IN | WEIGHT: 206.5 LBS | OXYGEN SATURATION: 98 % | RESPIRATION RATE: 18 BRPM | HEART RATE: 80 BPM | SYSTOLIC BLOOD PRESSURE: 126 MMHG | BODY MASS INDEX: 26.5 KG/M2 | TEMPERATURE: 96.8 F | DIASTOLIC BLOOD PRESSURE: 73 MMHG

## 2023-01-05 DIAGNOSIS — S66.812A FLEXOR TENDON RUPTURE OF HAND, LEFT, INITIAL ENCOUNTER: ICD-10-CM

## 2023-01-05 RX ORDER — OXYCODONE HYDROCHLORIDE 5 MG/1
5 TABLET ORAL EVERY 6 HOURS PRN
Qty: 6 TABLET | Refills: 0 | Status: SHIPPED | OUTPATIENT
Start: 2023-01-05 | End: 2023-04-14

## 2023-01-05 RX ORDER — ACETAMINOPHEN 325 MG/1
975 TABLET ORAL ONCE
Status: DISCONTINUED | OUTPATIENT
Start: 2023-01-05 | End: 2023-01-06 | Stop reason: HOSPADM

## 2023-01-05 RX ORDER — SODIUM CHLORIDE, SODIUM LACTATE, POTASSIUM CHLORIDE, CALCIUM CHLORIDE 600; 310; 30; 20 MG/100ML; MG/100ML; MG/100ML; MG/100ML
INJECTION, SOLUTION INTRAVENOUS CONTINUOUS
Status: DISCONTINUED | OUTPATIENT
Start: 2023-01-05 | End: 2023-01-06 | Stop reason: HOSPADM

## 2023-01-05 RX ORDER — LIDOCAINE 40 MG/G
CREAM TOPICAL
Status: DISCONTINUED | OUTPATIENT
Start: 2023-01-05 | End: 2023-01-06 | Stop reason: HOSPADM

## 2023-01-05 RX ORDER — ONDANSETRON 4 MG/1
4 TABLET, FILM COATED ORAL EVERY 6 HOURS PRN
Qty: 12 TABLET | Refills: 0 | Status: SHIPPED | OUTPATIENT
Start: 2023-01-05 | End: 2023-04-14

## 2023-01-05 RX ORDER — CEFAZOLIN SODIUM 2 G/50ML
2 SOLUTION INTRAVENOUS SEE ADMIN INSTRUCTIONS
Status: CANCELLED | OUTPATIENT
Start: 2023-01-05

## 2023-01-05 RX ORDER — CEFAZOLIN SODIUM 2 G/50ML
2 SOLUTION INTRAVENOUS
Status: CANCELLED | OUTPATIENT
Start: 2023-01-05

## 2023-01-05 RX ORDER — CEPHALEXIN 500 MG/1
500 CAPSULE ORAL 4 TIMES DAILY
Qty: 12 CAPSULE | Refills: 0 | Status: SHIPPED | OUTPATIENT
Start: 2023-01-05 | End: 2023-01-08

## 2023-01-05 NOTE — DISCHARGE INSTRUCTIONS
Hand Surgery Discharge Instructions    Patient has been treated for index finger exploration with Dr. Lenz on 1/5/2023.     Care: Please keep the splint/cast/dressing clean and dry at all times. Should it get wet, please call the clinic to schedule an appointment - as it may need to be replaced. Do not hesitate to use the sling to help protect the affected extremity and in particular in the setting of a nerve block.     Medications: You can take Ibuprofen 400-800 mg and Tylenol 650 mg for pain relief. Please take each every 6 hours, and for optimal pain relief - please stagger the medications so that you are taking one or the other every 3 hours. If you had a nerve block, the effects may last 8-12 hours. If you have been prescribed additional pain medications, please take as instructed and as needed. If you are taking additional pain medications, please do not exceed 4000 mg of Tylenol daily from all sources. Also, if you are taking narcotic medications, please do not operate heavy machinery or drive. If you have been prescribed antibiotics, please also take as instructed.     Diet: Start with clear liquids and slow down if nauseated. Advance the diet as tolerated.    Weight-bearing/Activities: Move your fingers to prevent stiffness. Elevate the affected extremity to improve throbbing sensation or pain. No strenuous activities and do not raise your heart rate above 100 bpm for the first few weeks. Otherwise, you can limit your weight-bearing with the affected extremity to 2-3 pounds unless otherwise specified and exclude use of the fingers wrapped in the splint.   ER Instructions: Please call the office and/or consider return to the ER if you experience worsening pain not relieved by medications, increased swelling, redness or high fevers >101F or if there are unexpected problems like shortness of breath.    Post-op follow-up: Clinic in 1 week with Dr. Lenz at the [facility] [place], [place] and will need to be seen in hand  therapy within 3-5 days if the flexor tendon was repaired.     Mandeep Lenz MD, PhD

## 2023-01-05 NOTE — PROGRESS NOTES
Ortho Hand    Patient with improved L IF function. On exam, no tenderness over the volar surface of the L IF. L IF FDS and FDP excursion is full/intact. Can perform all activities and is now slow progressed to all activities without restrictions after period of dorsal block splinting. Discussed options for additional management. The L IF FDP tendon is probably torn partially with either a rough edge that is occasionally catching within the pulley system versus held together by pseudotendon. MR indicates high-grade tear. However, the exam should be more compelling to justify surgery, in my opinion. If the tendon is partially torn, with cortes activity, it may fully rupture. If this is the case, the patient may experience pain and have loss of function. Instructed the patient to return to clinic promptly for re-evaluation should this occur. If this happens, we would like to proceed with repairing the flexor tendon within 2 weeks. Patient understands this and agrees with the plan to cancel surgery today. He verbalized understanding that he should return to clinic rapidly after there is any change in exam.     Mandeep Lenz MD, PhD

## 2023-01-05 NOTE — OR NURSING
Cancel left index finger surgery today per Dr Silva due to better function of finger.  Patient agrees with MD decision.  Will contact MD if symptoms return.

## 2023-01-10 ENCOUNTER — MYC REFILL (OUTPATIENT)
Dept: FAMILY MEDICINE | Facility: CLINIC | Age: 33
End: 2023-01-10

## 2023-01-10 DIAGNOSIS — F90.2 ATTENTION DEFICIT HYPERACTIVITY DISORDER (ADHD), COMBINED TYPE: ICD-10-CM

## 2023-01-11 RX ORDER — DEXTROAMPHETAMINE SACCHARATE, AMPHETAMINE ASPARTATE, DEXTROAMPHETAMINE SULFATE AND AMPHETAMINE SULFATE 3.75; 3.75; 3.75; 3.75 MG/1; MG/1; MG/1; MG/1
TABLET ORAL
Qty: 30 TABLET | Refills: 0 | Status: SHIPPED | OUTPATIENT
Start: 2023-01-11 | End: 2023-02-10

## 2023-01-11 RX ORDER — DEXTROAMPHETAMINE SACCHARATE, AMPHETAMINE ASPARTATE MONOHYDRATE, DEXTROAMPHETAMINE SULFATE AND AMPHETAMINE SULFATE 5; 5; 5; 5 MG/1; MG/1; MG/1; MG/1
20 CAPSULE, EXTENDED RELEASE ORAL DAILY
Qty: 30 CAPSULE | Refills: 0 | Status: SHIPPED | OUTPATIENT
Start: 2023-01-11 | End: 2023-02-10

## 2023-01-18 ENCOUNTER — TELEPHONE (OUTPATIENT)
Dept: ORTHOPEDICS | Facility: CLINIC | Age: 33
End: 2023-01-18
Payer: COMMERCIAL

## 2023-01-18 NOTE — TELEPHONE ENCOUNTER
Left Voicemail with patient asking if they wanted to keep visit for 1/20/23 since surgery was cancelled due to improved function. Visit is currently for post-op follow up.       Ramiro NGUYEN ATC

## 2023-02-10 ENCOUNTER — MYC REFILL (OUTPATIENT)
Dept: FAMILY MEDICINE | Facility: CLINIC | Age: 33
End: 2023-02-10
Payer: COMMERCIAL

## 2023-02-10 DIAGNOSIS — F90.2 ATTENTION DEFICIT HYPERACTIVITY DISORDER (ADHD), COMBINED TYPE: ICD-10-CM

## 2023-02-11 ENCOUNTER — MYC REFILL (OUTPATIENT)
Dept: FAMILY MEDICINE | Facility: CLINIC | Age: 33
End: 2023-02-11
Payer: COMMERCIAL

## 2023-02-11 DIAGNOSIS — F90.2 ATTENTION DEFICIT HYPERACTIVITY DISORDER (ADHD), COMBINED TYPE: ICD-10-CM

## 2023-02-13 RX ORDER — DEXTROAMPHETAMINE SACCHARATE, AMPHETAMINE ASPARTATE, DEXTROAMPHETAMINE SULFATE AND AMPHETAMINE SULFATE 3.75; 3.75; 3.75; 3.75 MG/1; MG/1; MG/1; MG/1
TABLET ORAL
Qty: 30 TABLET | Refills: 0 | Status: SHIPPED | OUTPATIENT
Start: 2023-02-13 | End: 2023-03-13

## 2023-02-13 RX ORDER — DEXTROAMPHETAMINE SACCHARATE, AMPHETAMINE ASPARTATE MONOHYDRATE, DEXTROAMPHETAMINE SULFATE AND AMPHETAMINE SULFATE 5; 5; 5; 5 MG/1; MG/1; MG/1; MG/1
20 CAPSULE, EXTENDED RELEASE ORAL DAILY
Qty: 30 CAPSULE | Refills: 0 | Status: SHIPPED | OUTPATIENT
Start: 2023-02-13 | End: 2023-03-13

## 2023-02-13 RX ORDER — DEXTROAMPHETAMINE SACCHARATE, AMPHETAMINE ASPARTATE, DEXTROAMPHETAMINE SULFATE AND AMPHETAMINE SULFATE 3.75; 3.75; 3.75; 3.75 MG/1; MG/1; MG/1; MG/1
TABLET ORAL
Qty: 30 TABLET | Refills: 0 | OUTPATIENT
Start: 2023-02-13

## 2023-02-13 RX ORDER — DEXTROAMPHETAMINE SACCHARATE, AMPHETAMINE ASPARTATE MONOHYDRATE, DEXTROAMPHETAMINE SULFATE AND AMPHETAMINE SULFATE 5; 5; 5; 5 MG/1; MG/1; MG/1; MG/1
20 CAPSULE, EXTENDED RELEASE ORAL DAILY
Qty: 30 CAPSULE | Refills: 0 | OUTPATIENT
Start: 2023-02-13

## 2023-03-13 ENCOUNTER — MYC REFILL (OUTPATIENT)
Dept: FAMILY MEDICINE | Facility: CLINIC | Age: 33
End: 2023-03-13
Payer: COMMERCIAL

## 2023-03-13 DIAGNOSIS — F90.2 ATTENTION DEFICIT HYPERACTIVITY DISORDER (ADHD), COMBINED TYPE: ICD-10-CM

## 2023-03-14 RX ORDER — DEXTROAMPHETAMINE SACCHARATE, AMPHETAMINE ASPARTATE MONOHYDRATE, DEXTROAMPHETAMINE SULFATE AND AMPHETAMINE SULFATE 5; 5; 5; 5 MG/1; MG/1; MG/1; MG/1
20 CAPSULE, EXTENDED RELEASE ORAL DAILY
Qty: 30 CAPSULE | Refills: 0 | Status: SHIPPED | OUTPATIENT
Start: 2023-03-14 | End: 2023-12-27

## 2023-03-14 RX ORDER — DEXTROAMPHETAMINE SACCHARATE, AMPHETAMINE ASPARTATE, DEXTROAMPHETAMINE SULFATE AND AMPHETAMINE SULFATE 3.75; 3.75; 3.75; 3.75 MG/1; MG/1; MG/1; MG/1
TABLET ORAL
Qty: 30 TABLET | Refills: 0 | Status: SHIPPED | OUTPATIENT
Start: 2023-03-14 | End: 2023-12-27

## 2023-04-14 ENCOUNTER — OFFICE VISIT (OUTPATIENT)
Dept: FAMILY MEDICINE | Facility: CLINIC | Age: 33
End: 2023-04-14
Attending: FAMILY MEDICINE
Payer: COMMERCIAL

## 2023-04-14 VITALS
BODY MASS INDEX: 26.44 KG/M2 | HEIGHT: 74 IN | RESPIRATION RATE: 12 BRPM | OXYGEN SATURATION: 99 % | WEIGHT: 206 LBS | DIASTOLIC BLOOD PRESSURE: 70 MMHG | TEMPERATURE: 97.7 F | HEART RATE: 87 BPM | SYSTOLIC BLOOD PRESSURE: 116 MMHG

## 2023-04-14 DIAGNOSIS — F90.2 ATTENTION DEFICIT HYPERACTIVITY DISORDER (ADHD), COMBINED TYPE: ICD-10-CM

## 2023-04-14 PROBLEM — R20.2 NUMBNESS AND TINGLING IN RIGHT HAND: Status: RESOLVED | Noted: 2019-07-15 | Resolved: 2023-04-14

## 2023-04-14 PROBLEM — R20.0 NUMBNESS AND TINGLING IN RIGHT HAND: Status: RESOLVED | Noted: 2019-07-15 | Resolved: 2023-04-14

## 2023-04-14 LAB
AMPHETAMINES UR QL: DETECTED
BARBITURATES UR QL SCN: NOT DETECTED
BENZODIAZ UR QL SCN: NOT DETECTED
BUPRENORPHINE UR QL: NOT DETECTED
CANNABINOIDS UR QL: NOT DETECTED
COCAINE UR QL SCN: NOT DETECTED
D-METHAMPHET UR QL: NOT DETECTED
METHADONE UR QL SCN: NOT DETECTED
OPIATES UR QL SCN: NOT DETECTED
OXYCODONE UR QL SCN: NOT DETECTED
PCP UR QL SCN: NOT DETECTED
PROPOXYPH UR QL: NOT DETECTED
TRICYCLICS UR QL SCN: NOT DETECTED

## 2023-04-14 PROCEDURE — 99213 OFFICE O/P EST LOW 20 MIN: CPT | Performed by: FAMILY MEDICINE

## 2023-04-14 PROCEDURE — 80306 DRUG TEST PRSMV INSTRMNT: CPT | Performed by: FAMILY MEDICINE

## 2023-04-14 RX ORDER — DEXTROAMPHETAMINE SACCHARATE, AMPHETAMINE ASPARTATE MONOHYDRATE, DEXTROAMPHETAMINE SULFATE AND AMPHETAMINE SULFATE 5; 5; 5; 5 MG/1; MG/1; MG/1; MG/1
20 CAPSULE, EXTENDED RELEASE ORAL DAILY
Qty: 30 CAPSULE | Refills: 0 | Status: SHIPPED | OUTPATIENT
Start: 2023-04-14 | End: 2023-05-14

## 2023-04-14 RX ORDER — DEXTROAMPHETAMINE SACCHARATE, AMPHETAMINE ASPARTATE MONOHYDRATE, DEXTROAMPHETAMINE SULFATE AND AMPHETAMINE SULFATE 5; 5; 5; 5 MG/1; MG/1; MG/1; MG/1
20 CAPSULE, EXTENDED RELEASE ORAL DAILY
Qty: 30 CAPSULE | Refills: 0 | Status: SHIPPED | OUTPATIENT
Start: 2023-05-15 | End: 2023-06-14

## 2023-04-14 RX ORDER — DEXTROAMPHETAMINE SACCHARATE, AMPHETAMINE ASPARTATE, DEXTROAMPHETAMINE SULFATE AND AMPHETAMINE SULFATE 3.75; 3.75; 3.75; 3.75 MG/1; MG/1; MG/1; MG/1
TABLET ORAL
Qty: 30 TABLET | Refills: 0 | Status: CANCELLED | OUTPATIENT
Start: 2023-04-14

## 2023-04-14 RX ORDER — DEXTROAMPHETAMINE SACCHARATE, AMPHETAMINE ASPARTATE, DEXTROAMPHETAMINE SULFATE AND AMPHETAMINE SULFATE 3.75; 3.75; 3.75; 3.75 MG/1; MG/1; MG/1; MG/1
15 TABLET ORAL DAILY
Qty: 30 TABLET | Refills: 0 | Status: SHIPPED | OUTPATIENT
Start: 2023-05-15 | End: 2023-06-14

## 2023-04-14 RX ORDER — DEXTROAMPHETAMINE SACCHARATE, AMPHETAMINE ASPARTATE, DEXTROAMPHETAMINE SULFATE AND AMPHETAMINE SULFATE 3.75; 3.75; 3.75; 3.75 MG/1; MG/1; MG/1; MG/1
15 TABLET ORAL DAILY
Qty: 30 TABLET | Refills: 0 | Status: SHIPPED | OUTPATIENT
Start: 2023-04-14 | End: 2023-05-14

## 2023-04-14 RX ORDER — DEXTROAMPHETAMINE SACCHARATE, AMPHETAMINE ASPARTATE MONOHYDRATE, DEXTROAMPHETAMINE SULFATE AND AMPHETAMINE SULFATE 5; 5; 5; 5 MG/1; MG/1; MG/1; MG/1
20 CAPSULE, EXTENDED RELEASE ORAL DAILY
Qty: 30 CAPSULE | Refills: 0 | Status: CANCELLED | OUTPATIENT
Start: 2023-04-14

## 2023-04-14 RX ORDER — DEXTROAMPHETAMINE SACCHARATE, AMPHETAMINE ASPARTATE MONOHYDRATE, DEXTROAMPHETAMINE SULFATE AND AMPHETAMINE SULFATE 5; 5; 5; 5 MG/1; MG/1; MG/1; MG/1
20 CAPSULE, EXTENDED RELEASE ORAL DAILY
Qty: 30 CAPSULE | Refills: 0 | Status: SHIPPED | OUTPATIENT
Start: 2023-06-15 | End: 2023-07-15

## 2023-04-14 RX ORDER — DEXTROAMPHETAMINE SACCHARATE, AMPHETAMINE ASPARTATE, DEXTROAMPHETAMINE SULFATE AND AMPHETAMINE SULFATE 3.75; 3.75; 3.75; 3.75 MG/1; MG/1; MG/1; MG/1
15 TABLET ORAL DAILY
Qty: 30 TABLET | Refills: 0 | Status: SHIPPED | OUTPATIENT
Start: 2023-06-15 | End: 2023-07-15

## 2023-04-14 ASSESSMENT — PAIN SCALES - GENERAL: PAINLEVEL: NO PAIN (0)

## 2023-04-14 ASSESSMENT — ANXIETY QUESTIONNAIRES
1. FEELING NERVOUS, ANXIOUS, OR ON EDGE: NEARLY EVERY DAY
3. WORRYING TOO MUCH ABOUT DIFFERENT THINGS: SEVERAL DAYS
7. FEELING AFRAID AS IF SOMETHING AWFUL MIGHT HAPPEN: NOT AT ALL
6. BECOMING EASILY ANNOYED OR IRRITABLE: SEVERAL DAYS
GAD7 TOTAL SCORE: 8
GAD7 TOTAL SCORE: 8
7. FEELING AFRAID AS IF SOMETHING AWFUL MIGHT HAPPEN: NOT AT ALL
2. NOT BEING ABLE TO STOP OR CONTROL WORRYING: SEVERAL DAYS
4. TROUBLE RELAXING: SEVERAL DAYS
IF YOU CHECKED OFF ANY PROBLEMS ON THIS QUESTIONNAIRE, HOW DIFFICULT HAVE THESE PROBLEMS MADE IT FOR YOU TO DO YOUR WORK, TAKE CARE OF THINGS AT HOME, OR GET ALONG WITH OTHER PEOPLE: SOMEWHAT DIFFICULT
GAD7 TOTAL SCORE: 8
8. IF YOU CHECKED OFF ANY PROBLEMS, HOW DIFFICULT HAVE THESE MADE IT FOR YOU TO DO YOUR WORK, TAKE CARE OF THINGS AT HOME, OR GET ALONG WITH OTHER PEOPLE?: SOMEWHAT DIFFICULT
5. BEING SO RESTLESS THAT IT IS HARD TO SIT STILL: SEVERAL DAYS

## 2023-04-14 ASSESSMENT — PATIENT HEALTH QUESTIONNAIRE - PHQ9
SUM OF ALL RESPONSES TO PHQ QUESTIONS 1-9: 5
SUM OF ALL RESPONSES TO PHQ QUESTIONS 1-9: 5
10. IF YOU CHECKED OFF ANY PROBLEMS, HOW DIFFICULT HAVE THESE PROBLEMS MADE IT FOR YOU TO DO YOUR WORK, TAKE CARE OF THINGS AT HOME, OR GET ALONG WITH OTHER PEOPLE: SOMEWHAT DIFFICULT

## 2023-04-14 NOTE — RESULT ENCOUNTER NOTE
Please inform of results if patient has not viewed in [employer] within 3 business days.    Your urine drug screen is normal except for the expected adderall.    Please call the clinic with any questions you may have.     Have a great day,    Dr. Pool

## 2023-04-14 NOTE — PROGRESS NOTES
Assessment & Plan   1. Attention deficit hyperactivity disorder (ADHD), combined type: UDS today.  3 months of refills given.  Additional 3 months can given at that time.  Blood pressure controlled.  No major side effects.  Working well.  Weight stable.  Follow-up in 6 months.  - PRIMARY CARE FOLLOW-UP SCHEDULING  - Drug Abuse Screen Panel 13, Urine (Pain Care Package) - lab collect; Future  - amphetamine-dextroamphetamine (ADDERALL XR) 20 MG 24 hr capsule; Take 1 capsule (20 mg) by mouth daily for 30 days  Dispense: 30 capsule; Refill: 0  - amphetamine-dextroamphetamine (ADDERALL XR) 20 MG 24 hr capsule; Take 1 capsule (20 mg) by mouth daily for 30 days  Dispense: 30 capsule; Refill: 0  - amphetamine-dextroamphetamine (ADDERALL XR) 20 MG 24 hr capsule; Take 1 capsule (20 mg) by mouth daily for 30 days  Dispense: 30 capsule; Refill: 0  - amphetamine-dextroamphetamine (ADDERALL) 15 MG tablet; Take 1 tablet (15 mg) by mouth daily for 30 days  Dispense: 30 tablet; Refill: 0  - amphetamine-dextroamphetamine (ADDERALL) 15 MG tablet; Take 1 tablet (15 mg) by mouth daily for 30 days  Dispense: 30 tablet; Refill: 0  - amphetamine-dextroamphetamine (ADDERALL) 15 MG tablet; Take 1 tablet (15 mg) by mouth daily for 30 days  Dispense: 30 tablet; Refill: 0  - Drug Abuse Screen Panel 13, Urine (Pain Care Package) - lab collect       Follow-up Visit   Expected date:  Oct 14, 2023 (Approximate)      Follow Up Appointment Details:     Follow-up with whom?: Me    Follow-Up for what?: Chronic Disease f/u    Chronic Disease f/u: General (Other)    Additional Details: ADHD    How?: In Person or Virtual    Is this an as-needed follow-up?: No                  Tanner Ferro MD  [facility]    Disclaimer: This note consists of symbols derived from keyboarding, dictation and/or voice recognition software. As a result, there may be errors in the script that have gone undetected. Please consider this  "when interpreting information found in this chart.      Subjective   Vernon is a 33 year old, presenting for the following health issues:  Recheck Medication        4/14/2023     6:45 AM   Additional Questions   Roomed by Nuzhat Griffith CMA   Accompanied by self         4/14/2023     6:45 AM   Patient Reported Additional Medications   Patient reports taking the following new medications none     History of Present Illness       Reason for visit:  Adhd medication follow up    He eats 2-3 servings of fruits and vegetables daily.He consumes 3 sweetened beverage(s) daily.He exercises with enough effort to increase his heart rate 60 or more minutes per day.  He exercises with enough effort to increase his heart rate 5 days per week. He is missing 1 dose(s) of medications per week.  He is not taking prescribed medications regularly due to other.    Today's PHQ-9         PHQ-9 Total Score: 5    PHQ-9 Q9 Thoughts of better off dead/self-harm past 2 weeks :   Not at all    How difficult have these problems made it for you to do your work, take care of things at home, or get along with other people: Somewhat difficult  Today's KALANI-7 Score: 8       Medication Followup of Adderall    Taking Medication as prescribed: yes    Side Effects:  None    Medication Helping Symptoms:  yes    Patient is here today to follow-up on Adderall.  Working well.  No changes.  No side effects.  No significant appetite suppression, weight changes, headaches, palpitations, anxiety, insomnia, zombielike sensation.    No new social stressors.  Doing well on Prozac.    Did not end up having surgery of the left finger tendon.  And fingers now working well.    Review of Systems   Constitutional, HEENT, cardiovascular, pulmonary, gi and gu systems are negative, except as otherwise noted.      Objective    /70   Pulse 87   Temp 97.7  F (36.5  C) (Temporal)   Resp 12   Ht 1.885 m (6' 2.21\")   Wt 93.4 kg (206 lb)   SpO2 99%   BMI 26.30 kg/m    Body " mass index is 26.3 kg/m .  Physical Exam   General: Appears well and in no acute distress.  Cardiovascular: Regular rate and rhythm, normal S1 and S2 without murmur. No extra heartsounds or friction rub. Radial pulses present and equal bilaterally.  Respiratory:  Lungs clear to auscultation bilaterally. No wheezing or crackles. No prolonged expiration. Symmetrical chest rise.  Musculoskeletal: No gross extremity deformities. No peripheral edema. Normal muscle bulk.    Labs: pending

## 2023-04-23 ENCOUNTER — HEALTH MAINTENANCE LETTER (OUTPATIENT)
Age: 33
End: 2023-04-23

## 2023-08-10 ENCOUNTER — MYC REFILL (OUTPATIENT)
Dept: FAMILY MEDICINE | Facility: CLINIC | Age: 33
End: 2023-08-10
Payer: COMMERCIAL

## 2023-08-10 DIAGNOSIS — F90.2 ATTENTION DEFICIT HYPERACTIVITY DISORDER (ADHD), COMBINED TYPE: Primary | ICD-10-CM

## 2023-08-10 RX ORDER — DEXTROAMPHETAMINE SACCHARATE, AMPHETAMINE ASPARTATE MONOHYDRATE, DEXTROAMPHETAMINE SULFATE AND AMPHETAMINE SULFATE 5; 5; 5; 5 MG/1; MG/1; MG/1; MG/1
20 CAPSULE, EXTENDED RELEASE ORAL DAILY
Qty: 30 CAPSULE | Refills: 0 | Status: SHIPPED | OUTPATIENT
Start: 2023-10-11 | End: 2023-11-10

## 2023-08-10 RX ORDER — DEXTROAMPHETAMINE SACCHARATE, AMPHETAMINE ASPARTATE MONOHYDRATE, DEXTROAMPHETAMINE SULFATE AND AMPHETAMINE SULFATE 5; 5; 5; 5 MG/1; MG/1; MG/1; MG/1
20 CAPSULE, EXTENDED RELEASE ORAL DAILY
Qty: 30 CAPSULE | Refills: 0 | Status: SHIPPED | OUTPATIENT
Start: 2023-09-10 | End: 2023-10-10

## 2023-08-10 RX ORDER — DEXTROAMPHETAMINE SACCHARATE, AMPHETAMINE ASPARTATE, DEXTROAMPHETAMINE SULFATE AND AMPHETAMINE SULFATE 3.75; 3.75; 3.75; 3.75 MG/1; MG/1; MG/1; MG/1
15 TABLET ORAL DAILY
Qty: 30 TABLET | Refills: 0 | Status: SHIPPED | OUTPATIENT
Start: 2023-09-10 | End: 2023-10-10

## 2023-08-10 RX ORDER — DEXTROAMPHETAMINE SACCHARATE, AMPHETAMINE ASPARTATE, DEXTROAMPHETAMINE SULFATE AND AMPHETAMINE SULFATE 3.75; 3.75; 3.75; 3.75 MG/1; MG/1; MG/1; MG/1
TABLET ORAL
Qty: 30 TABLET | Refills: 0 | Status: CANCELLED | OUTPATIENT
Start: 2023-08-10

## 2023-08-10 RX ORDER — DEXTROAMPHETAMINE SACCHARATE, AMPHETAMINE ASPARTATE, DEXTROAMPHETAMINE SULFATE AND AMPHETAMINE SULFATE 3.75; 3.75; 3.75; 3.75 MG/1; MG/1; MG/1; MG/1
15 TABLET ORAL DAILY
Qty: 30 TABLET | Refills: 0 | Status: SHIPPED | OUTPATIENT
Start: 2023-10-11 | End: 2023-11-10

## 2023-08-10 RX ORDER — DEXTROAMPHETAMINE SACCHARATE, AMPHETAMINE ASPARTATE MONOHYDRATE, DEXTROAMPHETAMINE SULFATE AND AMPHETAMINE SULFATE 5; 5; 5; 5 MG/1; MG/1; MG/1; MG/1
20 CAPSULE, EXTENDED RELEASE ORAL DAILY
Qty: 30 CAPSULE | Refills: 0 | Status: CANCELLED | OUTPATIENT
Start: 2023-08-10

## 2023-08-10 RX ORDER — DEXTROAMPHETAMINE SACCHARATE, AMPHETAMINE ASPARTATE MONOHYDRATE, DEXTROAMPHETAMINE SULFATE AND AMPHETAMINE SULFATE 5; 5; 5; 5 MG/1; MG/1; MG/1; MG/1
20 CAPSULE, EXTENDED RELEASE ORAL DAILY
Qty: 30 CAPSULE | Refills: 0 | Status: SHIPPED | OUTPATIENT
Start: 2023-08-10 | End: 2023-09-09

## 2023-08-10 RX ORDER — DEXTROAMPHETAMINE SACCHARATE, AMPHETAMINE ASPARTATE, DEXTROAMPHETAMINE SULFATE AND AMPHETAMINE SULFATE 3.75; 3.75; 3.75; 3.75 MG/1; MG/1; MG/1; MG/1
15 TABLET ORAL DAILY
Qty: 30 TABLET | Refills: 0 | Status: SHIPPED | OUTPATIENT
Start: 2023-08-10 | End: 2023-09-09

## 2023-08-10 NOTE — TELEPHONE ENCOUNTER
Requested Prescriptions   Pending Prescriptions Disp Refills    amphetamine-dextroamphetamine (ADDERALL XR) 20 MG 24 hr capsule 30 capsule 0     Sig: Take 1 capsule (20 mg) by mouth daily       There is no refill protocol information for this order       amphetamine-dextroamphetamine (ADDERALL) 15 MG tablet 30 tablet 0     Sig: Take 1 capsule by mouth daily in the early afternoon       There is no refill protocol information for this order

## 2023-09-07 ENCOUNTER — MYC REFILL (OUTPATIENT)
Dept: FAMILY MEDICINE | Facility: CLINIC | Age: 33
End: 2023-09-07
Payer: COMMERCIAL

## 2023-09-07 DIAGNOSIS — F90.2 ATTENTION DEFICIT HYPERACTIVITY DISORDER (ADHD), COMBINED TYPE: ICD-10-CM

## 2023-09-07 RX ORDER — DEXTROAMPHETAMINE SACCHARATE, AMPHETAMINE ASPARTATE MONOHYDRATE, DEXTROAMPHETAMINE SULFATE AND AMPHETAMINE SULFATE 5; 5; 5; 5 MG/1; MG/1; MG/1; MG/1
20 CAPSULE, EXTENDED RELEASE ORAL DAILY
Qty: 30 CAPSULE | Refills: 0 | OUTPATIENT
Start: 2023-09-07

## 2023-09-07 RX ORDER — DEXTROAMPHETAMINE SACCHARATE, AMPHETAMINE ASPARTATE, DEXTROAMPHETAMINE SULFATE AND AMPHETAMINE SULFATE 3.75; 3.75; 3.75; 3.75 MG/1; MG/1; MG/1; MG/1
15 TABLET ORAL DAILY
Qty: 30 TABLET | Refills: 0 | OUTPATIENT
Start: 2023-09-07

## 2023-09-07 RX ORDER — DEXTROAMPHETAMINE SACCHARATE, AMPHETAMINE ASPARTATE, DEXTROAMPHETAMINE SULFATE AND AMPHETAMINE SULFATE 3.75; 3.75; 3.75; 3.75 MG/1; MG/1; MG/1; MG/1
TABLET ORAL
Qty: 30 TABLET | Refills: 0 | OUTPATIENT
Start: 2023-09-07

## 2023-09-11 ENCOUNTER — VIRTUAL VISIT (OUTPATIENT)
Dept: FAMILY MEDICINE | Facility: CLINIC | Age: 33
End: 2023-09-11
Payer: COMMERCIAL

## 2023-09-11 DIAGNOSIS — F90.2 ATTENTION DEFICIT HYPERACTIVITY DISORDER (ADHD), COMBINED TYPE: Primary | ICD-10-CM

## 2023-09-11 PROCEDURE — 99213 OFFICE O/P EST LOW 20 MIN: CPT | Mod: VID | Performed by: FAMILY MEDICINE

## 2023-09-11 RX ORDER — DEXTROAMPHETAMINE SACCHARATE, AMPHETAMINE ASPARTATE, DEXTROAMPHETAMINE SULFATE AND AMPHETAMINE SULFATE 3.75; 3.75; 3.75; 3.75 MG/1; MG/1; MG/1; MG/1
15 TABLET ORAL DAILY
Qty: 30 TABLET | Refills: 0 | Status: SHIPPED | OUTPATIENT
Start: 2023-09-11 | End: 2023-10-11

## 2023-09-11 RX ORDER — DEXTROAMPHETAMINE SACCHARATE, AMPHETAMINE ASPARTATE MONOHYDRATE, DEXTROAMPHETAMINE SULFATE AND AMPHETAMINE SULFATE 5; 5; 5; 5 MG/1; MG/1; MG/1; MG/1
20 CAPSULE, EXTENDED RELEASE ORAL DAILY
Qty: 30 CAPSULE | Refills: 0 | Status: SHIPPED | OUTPATIENT
Start: 2023-09-11 | End: 2023-10-11

## 2023-09-11 RX ORDER — DEXTROAMPHETAMINE SACCHARATE, AMPHETAMINE ASPARTATE MONOHYDRATE, DEXTROAMPHETAMINE SULFATE AND AMPHETAMINE SULFATE 5; 5; 5; 5 MG/1; MG/1; MG/1; MG/1
20 CAPSULE, EXTENDED RELEASE ORAL DAILY
Qty: 30 CAPSULE | Refills: 0 | Status: SHIPPED | OUTPATIENT
Start: 2023-11-12 | End: 2023-12-12

## 2023-09-11 RX ORDER — DEXTROAMPHETAMINE SACCHARATE, AMPHETAMINE ASPARTATE, DEXTROAMPHETAMINE SULFATE AND AMPHETAMINE SULFATE 3.75; 3.75; 3.75; 3.75 MG/1; MG/1; MG/1; MG/1
15 TABLET ORAL DAILY
Qty: 30 TABLET | Refills: 0 | Status: SHIPPED | OUTPATIENT
Start: 2023-10-12 | End: 2023-11-11

## 2023-09-11 RX ORDER — DEXTROAMPHETAMINE SACCHARATE, AMPHETAMINE ASPARTATE MONOHYDRATE, DEXTROAMPHETAMINE SULFATE AND AMPHETAMINE SULFATE 5; 5; 5; 5 MG/1; MG/1; MG/1; MG/1
20 CAPSULE, EXTENDED RELEASE ORAL DAILY
Qty: 30 CAPSULE | Refills: 0 | Status: SHIPPED | OUTPATIENT
Start: 2023-10-12 | End: 2023-11-11

## 2023-09-11 RX ORDER — DEXTROAMPHETAMINE SACCHARATE, AMPHETAMINE ASPARTATE, DEXTROAMPHETAMINE SULFATE AND AMPHETAMINE SULFATE 3.75; 3.75; 3.75; 3.75 MG/1; MG/1; MG/1; MG/1
15 TABLET ORAL DAILY
Qty: 30 TABLET | Refills: 0 | Status: SHIPPED | OUTPATIENT
Start: 2023-11-12 | End: 2023-12-12

## 2023-09-11 RX ORDER — DEXTROAMPHETAMINE SACCHARATE, AMPHETAMINE ASPARTATE MONOHYDRATE, DEXTROAMPHETAMINE SULFATE AND AMPHETAMINE SULFATE 5; 5; 5; 5 MG/1; MG/1; MG/1; MG/1
20 CAPSULE, EXTENDED RELEASE ORAL DAILY
Qty: 30 CAPSULE | Refills: 0 | Status: CANCELLED | OUTPATIENT
Start: 2023-10-11

## 2023-09-11 RX ORDER — DEXTROAMPHETAMINE SACCHARATE, AMPHETAMINE ASPARTATE, DEXTROAMPHETAMINE SULFATE AND AMPHETAMINE SULFATE 3.75; 3.75; 3.75; 3.75 MG/1; MG/1; MG/1; MG/1
15 TABLET ORAL DAILY
Qty: 30 TABLET | Refills: 0 | Status: CANCELLED | OUTPATIENT
Start: 2023-10-11

## 2023-09-11 NOTE — PROGRESS NOTES
Vernon is a 33 year old who is being evaluated via a billable video visit.      How would you like to obtain your AVS? MyChart  If the video visit is dropped, the invitation should be resent by: Text to cell phone: [phone]  Will anyone else be joining your video visit? No    Assessment & Plan   1. Attention deficit hyperactivity disorder (ADHD), combined type:3 months of refills given.  Additional 3 months can given at that time.  Blood pressure controlled.  No major side effects.  Working well.  Weight stable.  Follow-up in 6 months.  - PRIMARY CARE FOLLOW-UP SCHEDULING  - Drug Abuse Screen Panel 13, Urine (Pain Care Package) - lab collect; Future  - amphetamine-dextroamphetamine (ADDERALL XR) 20 MG 24 hr capsule; Take 1 capsule (20 mg) by mouth daily for 30 days  Dispense: 30 capsule; Refill: 0  - amphetamine-dextroamphetamine (ADDERALL XR) 20 MG 24 hr capsule; Take 1 capsule (20 mg) by mouth daily for 30 days  Dispense: 30 capsule; Refill: 0  - amphetamine-dextroamphetamine (ADDERALL XR) 20 MG 24 hr capsule; Take 1 capsule (20 mg) by mouth daily for 30 days  Dispense: 30 capsule; Refill: 0  - amphetamine-dextroamphetamine (ADDERALL) 15 MG tablet; Take 1 tablet (15 mg) by mouth daily for 30 days  Dispense: 30 tablet; Refill: 0  - amphetamine-dextroamphetamine (ADDERALL) 15 MG tablet; Take 1 tablet (15 mg) by mouth daily for 30 days  Dispense: 30 tablet; Refill: 0  - amphetamine-dextroamphetamine (ADDERALL) 15 MG tablet; Take 1 tablet (15 mg) by mouth daily for 30 days  Dispense: 30 tablet; Refill: 0    Tanner Ferro MD  [facility]    Disclaimer: This note consists of symbols derived from keyboarding, dictation and/or voice recognition software. As a result, there may be errors in the script that have gone undetected. Please consider this when interpreting information found in this chart.      Subjective   Vernon is a 33 year old, presenting for the following health  issues:  Refill Request (Adderall)        9/11/2023     7:47 AM   Additional Questions   Roomed by Stefano NAM   Accompanied by Self         9/11/2023     7:47 AM   Patient Reported Additional Medications   Patient reports taking the following new medications None     History of Present Illness       Reason for visit:  Medication renewal    He eats 2-3 servings of fruits and vegetables daily.He consumes 4 sweetened beverage(s) daily.He exercises with enough effort to increase his heart rate 60 or more minutes per day.  He exercises with enough effort to increase his heart rate 5 days per week.   He is taking medications regularly.       Went off prozac.    Patient is here today to follow-up on Adderall. Working well. No changes. No side effects. No significant appetite suppression, weight changes, headaches, palpitations, anxiety, insomnia, zombielike sensation.     No new life stressors.     PDMP reviewed.    Review of Systems   Constitutional, HEENT, cardiovascular, pulmonary, GI, , musculoskeletal, neuro, skin, endocrine and psych systems are negative, except as otherwise noted.      Objective           Vitals:  No vitals were obtained today due to virtual visit.    Physical Exam   GENERAL: Healthy, alert and no distress  EYES: Eyes grossly normal to inspection.  No discharge or erythema, or obvious scleral/conjunctival abnormalities.  RESP: No audible wheeze, cough, or visible cyanosis.  No visible retractions or increased work of breathing.    SKIN: Visible skin clear. No significant rash, abnormal pigmentation or lesions.  NEURO: Cranial nerves grossly intact.  Mentation and speech appropriate for age.  PSYCH: Mentation appears normal, affect normal/bright, judgement and insight intact, normal speech and appearance well-groomed.    Labs: none        Video-Visit Details    Type of service:  Video Visit   Video Start Time: 4:19 PM  Video End Time:4:23 PM    Originating Location (pt. Location): Home    Distant  Location (provider location):  On-site  Platform used for Video Visit: Ginny

## 2023-11-24 ENCOUNTER — OFFICE VISIT (OUTPATIENT)
Dept: FAMILY MEDICINE | Facility: CLINIC | Age: 33
End: 2023-11-24
Payer: COMMERCIAL

## 2023-11-24 VITALS
TEMPERATURE: 97.8 F | RESPIRATION RATE: 16 BRPM | DIASTOLIC BLOOD PRESSURE: 60 MMHG | OXYGEN SATURATION: 99 % | HEIGHT: 74 IN | SYSTOLIC BLOOD PRESSURE: 116 MMHG | HEART RATE: 83 BPM | BODY MASS INDEX: 27.72 KG/M2 | WEIGHT: 216 LBS

## 2023-11-24 DIAGNOSIS — N34.2 URETHRITIS: ICD-10-CM

## 2023-11-24 DIAGNOSIS — Z11.3 SCREEN FOR STD (SEXUALLY TRANSMITTED DISEASE): Primary | ICD-10-CM

## 2023-11-24 LAB
ALBUMIN UR-MCNC: NEGATIVE MG/DL
APPEARANCE UR: CLEAR
BACTERIA #/AREA URNS HPF: ABNORMAL /HPF
BILIRUB UR QL STRIP: NEGATIVE
C TRACH DNA SPEC QL PROBE+SIG AMP: NEGATIVE
COLOR UR AUTO: YELLOW
GLUCOSE UR STRIP-MCNC: NEGATIVE MG/DL
HBV CORE AB SERPL QL IA: NONREACTIVE
HBV SURFACE AB SERPL IA-ACNC: 521.84 M[IU]/ML
HBV SURFACE AB SERPL IA-ACNC: REACTIVE M[IU]/ML
HBV SURFACE AG SERPL QL IA: NONREACTIVE
HCV AB SERPL QL IA: NONREACTIVE
HGB UR QL STRIP: NEGATIVE
HIV 1+2 AB+HIV1 P24 AG SERPL QL IA: NONREACTIVE
KETONES UR STRIP-MCNC: ABNORMAL MG/DL
LEUKOCYTE ESTERASE UR QL STRIP: NEGATIVE
MUCOUS THREADS #/AREA URNS LPF: PRESENT /LPF
N GONORRHOEA DNA SPEC QL NAA+PROBE: NEGATIVE
NITRATE UR QL: NEGATIVE
PH UR STRIP: 6 [PH] (ref 5–7)
RBC #/AREA URNS AUTO: ABNORMAL /HPF
SP GR UR STRIP: 1.02 (ref 1–1.03)
SQUAMOUS #/AREA URNS AUTO: ABNORMAL /LPF
T PALLIDUM AB SER QL: NONREACTIVE
UROBILINOGEN UR STRIP-ACNC: 0.2 E.U./DL
WBC #/AREA URNS AUTO: ABNORMAL /HPF

## 2023-11-24 PROCEDURE — 81001 URINALYSIS AUTO W/SCOPE: CPT | Performed by: FAMILY MEDICINE

## 2023-11-24 PROCEDURE — 36415 COLL VENOUS BLD VENIPUNCTURE: CPT | Performed by: FAMILY MEDICINE

## 2023-11-24 PROCEDURE — 86803 HEPATITIS C AB TEST: CPT | Performed by: FAMILY MEDICINE

## 2023-11-24 PROCEDURE — 87389 HIV-1 AG W/HIV-1&-2 AB AG IA: CPT | Performed by: FAMILY MEDICINE

## 2023-11-24 PROCEDURE — 99213 OFFICE O/P EST LOW 20 MIN: CPT | Performed by: FAMILY MEDICINE

## 2023-11-24 PROCEDURE — 86780 TREPONEMA PALLIDUM: CPT | Performed by: FAMILY MEDICINE

## 2023-11-24 PROCEDURE — 86706 HEP B SURFACE ANTIBODY: CPT | Performed by: FAMILY MEDICINE

## 2023-11-24 PROCEDURE — 87491 CHLMYD TRACH DNA AMP PROBE: CPT | Performed by: FAMILY MEDICINE

## 2023-11-24 PROCEDURE — 86696 HERPES SIMPLEX TYPE 2 TEST: CPT | Performed by: FAMILY MEDICINE

## 2023-11-24 PROCEDURE — 86695 HERPES SIMPLEX TYPE 1 TEST: CPT | Performed by: FAMILY MEDICINE

## 2023-11-24 PROCEDURE — 87591 N.GONORRHOEAE DNA AMP PROB: CPT | Performed by: FAMILY MEDICINE

## 2023-11-24 PROCEDURE — 87340 HEPATITIS B SURFACE AG IA: CPT | Performed by: FAMILY MEDICINE

## 2023-11-24 PROCEDURE — 86704 HEP B CORE ANTIBODY TOTAL: CPT | Performed by: FAMILY MEDICINE

## 2023-11-24 RX ORDER — DOXYCYCLINE 100 MG/1
100 CAPSULE ORAL 2 TIMES DAILY
Qty: 14 CAPSULE | Refills: 0 | Status: SHIPPED | OUTPATIENT
Start: 2023-11-24 | End: 2023-12-01

## 2023-11-24 RX ORDER — TAMSULOSIN HYDROCHLORIDE 0.4 MG/1
0.4 CAPSULE ORAL DAILY
Qty: 2 CAPSULE | Refills: 0 | Status: SHIPPED | OUTPATIENT
Start: 2023-11-24 | End: 2023-11-28

## 2023-11-24 ASSESSMENT — PAIN SCALES - GENERAL: PAINLEVEL: NO PAIN (0)

## 2023-11-24 ASSESSMENT — PATIENT HEALTH QUESTIONNAIRE - PHQ9
SUM OF ALL RESPONSES TO PHQ QUESTIONS 1-9: 7
10. IF YOU CHECKED OFF ANY PROBLEMS, HOW DIFFICULT HAVE THESE PROBLEMS MADE IT FOR YOU TO DO YOUR WORK, TAKE CARE OF THINGS AT HOME, OR GET ALONG WITH OTHER PEOPLE: SOMEWHAT DIFFICULT
SUM OF ALL RESPONSES TO PHQ QUESTIONS 1-9: 7

## 2023-11-24 NOTE — LETTER
"December 5, 2023      Vernon BILL Alex  [address] [address]        Dear ,    We are writing to inform you of your test results.    From Dr. Marr-    \"Hello -Thank you for speaking with me today, please note more information about your results.     The herpes simplex virus (HSV) antibodies test is a type of herpes simplex test that determines whether or not you've ever been exposed to this virus. Specifically, it's a blood test that looks for certain antibodies in your body.       Antibodies are proteins that are made by your immune system to fight off particular pathogens - usually bacteria and viruses. This test looks to see if you have antibodies against either HSV-1 or HSV-2 - the two kinds of herpes viruses.       Your body begins to make two relevant types of HSV-1 antibody or HSV-2 antibody around 18 to 21 days after you're first infected. These two types of antibodies are called IgG and IgM antibodies.     IgG antibodies are the most common kind and IgM antibodies are the first kind that your body makes when you first come into contact with a virus or bacteria. Both can be found in your blood and other bodily fluids.     The best versions of this test can tell the difference between previous exposure to either HSV-1 or HSV-2.     What Is the Herpes Simplex Virus?  The herpes simplex virus is a type of virus that causes infections in humans. Approximately 3.7 billion people under the age of fifty - or 67% - have been infected with HSV-1 in their lifetimes. There are about 491 million cases of HSV-2 worldwide - or 13% of the global population between the ages of 15 and 49.     HSV is spread through direct contact with the virus. This can happen during sex or while giving birth.     The virus leads to an increased risk of infection with human immunodeficiency virus (HIV). It can also cause you to develop more severe conditions if you have a compromised immune system.        HSV can have " particularly problematic results if you're infected with the virus when you're born. The chance of passing it on while pregnant is low but - if it happens - it can lead to disabilities and death.     There's also a social stigma associated with this virus that can be damaging to your mental health if you ever become infected.        What Are the Symptoms of the Herpes Simplex Virus?  In most cases of infection with the herpes simplex virus, you won't have any symptoms at all or they'll be very mild. Typically, your first outbreak is the most severe. Symptoms will periodically come back, but studies show that these bouts are shorter and less symptomatic than the first.     Breakouts typically happen in the genital region when you have HSV-2 and near your lips when you have HSV-1. But you can also get HSV-1 infections in your genital region.     Symptoms include:     Blisters that appear either individually or in clusters; they can be quite painful and may ooze.  Soreness, pain, and burning at the infection site  Headaches  Fever  Body aches and pains  When Do You Need a Herpes Antibodies Test?  Herpes antibodies tests are not recommended when your doctor wants to diagnose an active infection. The best tests, in this case, are to collect a sample from you and attempt to culture the virus or test for the presence of its genome using a technique called polymerase chain reaction (PCR).        Should an active infection be detected with a PCR test, your doctor may prescribe  antiviral medications to help prevent outbreaks or shorten their severity. When outbreaks occur when you are on these medications, they're typically shorter and create fewer lesions than when you aren't taking them.     Common antiviral medications include acyclovir, famciclovir, and valacyclovir.     Your doctor may recommend an antibody test in addition to these other tests for an active infection. But it is more likely that they will recommend this  "test for another reason, including if:     You have recurring symptoms but active test results are negative.  You believe you were recently exposed but don't yet have symptoms.  You're pregnant or trying to become pregnant.  You have HIV or have a high risk of encountering HIV.     How Does the Herpes Antibodies Test Work?  A herpes antibodies test works by looking in your blood for HSV-1 antibodies or HSV-2 antibodies - typically the IgG type but sometimes the IgM type.     These tests only indicate exposure to either HSV-1 or HSV-2 at some point in your past. This does not necessarily mean that HSV is causing any current symptoms that you may have - since the exposure could have been years ago.     If you are worried about a new infection, it might be useful to take the test again around 21 days later to make sure that your body has had enough time to produce the antibodies.     The tests can give false-positive results. You may want to repeat the test or try other forms of testing to confirm the results.         Why Is the IgM HSV Antibodies Test No Longer Recommended?  The Centers for Disease Control and Prevention, the Infectious Diseases Society of [place], and the American Society for Microbiology all recommend the use of the IgG antibodies test over the IgM HSV test.     IgG tests are preferred because:     For herpes, IgG and IgM antibodies show up around the same time - normally, IgM antibodies appear first.  IgM antibodies may only last a few months and may not be made after the first outbreak - but IgG antibodies last indefinitely and are made during all outbreaks.     IgG antibodies can distinguish between HSV-1 and HSV-2 and IgM antibodies can't.       Please let us know if you have any questions or concerns.     Regards,  Jose L Marr MD  Written by Jose L Marr MD on 11/29/2023\"    Resulted Orders   Hepatitis B Surface Antibody   Result Value Ref Range    Hepatitis B Surface Antibody " Instrument Value 521.84 <8.00 m[IU]/mL    Hepatitis B Surface Antibody Reactive       Comment:      Patient is considered to be immune to infection with hepatitis B when the value is greater than or equal to 12.00 mIU/mL.   HEPATITIS B CORE ANTIBODY   Result Value Ref Range    Hepatitis B Core Antibody Total Nonreactive Nonreactive   HEPATITIS B SURFACE ANTIGEN   Result Value Ref Range    Hepatitis B Surface Antigen Nonreactive Nonreactive   HEPATITS C ANTIBODY   Result Value Ref Range    Hepatitis C Antibody Nonreactive Nonreactive    Narrative    Assay performance characteristics have not been established for newborns, infants, and children.       If you have any questions or concerns, please call the clinic at the number listed above.       Sincerely,      Jose L Marr MD

## 2023-11-24 NOTE — PROGRESS NOTES
"  Assessment & Plan     Screen for STD (sexually transmitted disease)  PLAN: Condoms strongly recommended.  Information given to the patient, see AVS  Partners to be screened.  Side effects of today's medications discussed with the patient    - UA with Microscopic reflex to Culture - lab collect; Future  - Chlamydia trachomatis/Neisseria gonorrhoeae by PCR - Clinic Collect  - Herpes Simplex Virus 1 and 2 IgG; Future  - Treponema Abs w Reflex to RPR and Titer; Future  - HIV Antigen Antibody Combo; Future  - Hepatitis B Surface Antibody  - HEPATITIS B CORE ANTIBODY  - HEPATITIS B SURFACE ANTIGEN  - HEPATITS C ANTIBODY  - UA with Microscopic reflex to Culture - lab collect  - Herpes Simplex Virus 1 and 2 IgG  - Treponema Abs w Reflex to RPR and Titer  - HIV Antigen Antibody Combo    Urethritis  - doxycycline hyclate (VIBRAMYCIN) 100 MG capsule; Take 1 capsule (100 mg) by mouth 2 times daily for 7 days  - tamsulosin (FLOMAX) 0.4 MG capsule; Take 1 capsule (0.4 mg) by mouth daily for 2 days         BMI:   Estimated body mass index is 27.58 kg/m  as calculated from the following:    Height as of this encounter: 1.885 m (6' 2.21\").    Weight as of this encounter: 98 kg (216 lb).           Jose L Marr MD  [facility] MELYSSA Junior is a 33 year old, presenting for the following health issues:  STD      11/24/2023     8:52 AM   Additional Questions   Roomed by Mane FAIRCHILD CMA   Accompanied by self         11/24/2023     8:52 AM   Patient Reported Additional Medications   Patient reports taking the following new medications n/a       Genitourinary - Male  Onset: 2 days ago  Description:   Dysuria (painful urination): YES  Hematuria (blood in urine): no   Frequency: no   Are you urinating at night : no   Hesitancy (delay in urine): no   Retention (unable to empty): YES  Decrease in urinary flow: no   Incontinence: no   Progression of Symptoms:  waxing and waning  Accompanying Signs & " "Symptoms:  Fever: no   Back/Flank pain: no   Urethral discharge: no   Testicle lumps/masses/pain: no   Nausea and/or vomiting: no   Abdominal pain: no   History:   History of frequent UTI's: no   History of kidney stones: no   History of hernias: no   Personal or Family history of Prostate problems: N/A  Sexually active: YES         Review of Systems   Constitutional, HEENT, cardiovascular, pulmonary, GI, , musculoskeletal, neuro, skin, endocrine and psych systems are negative, except as otherwise noted.      Objective    /60   Pulse 83   Temp 97.8  F (36.6  C) (Temporal)   Resp 16   Ht 1.885 m (6' 2.21\")   Wt 98 kg (216 lb)   SpO2 99%   BMI 27.58 kg/m    Body mass index is 27.58 kg/m .  Physical Exam   GENERAL: healthy, alert and no distress  EYES: Eyes grossly normal to inspection, PERRL and conjunctivae and sclerae normal  RESP: lungs clear to auscultation - no rales, rhonchi or wheezes  CV: regular rate and rhythm, normal S1 S2, no S3 or S4, no murmur, click or rub, no peripheral edema and peripheral pulses strong  ABDOMEN: soft, nontender, no hepatosplenomegaly, no masses and bowel sounds normal              Answers submitted by the patient for this visit:  Patient Health Questionnaire (Submitted on 11/24/2023)  If you checked off any problems, how difficult have these problems made it for you to do your work, take care of things at home, or get along with other people?: Somewhat difficult  PHQ9 TOTAL SCORE: 7  General Questionnaire (Submitted on 11/24/2023)  Chief Complaint: Chronic problems general questions HPI Form  How many servings of fruits and vegetables do you eat daily?: 2-3  On average, how many sweetened beverages do you drink each day (Examples: soda, juice, sweet tea, etc.  Do NOT count diet or artificially sweetened beverages)?: 3  How many minutes a day do you exercise enough to make your heart beat faster?: 60 or more  How many days a week do you exercise enough to make your " heart beat faster?: 5  How many days per week do you miss taking your medication?: 1  General Concern (Submitted on 11/24/2023)  Chief Complaint: Chronic problems general questions HPI Form  What is the reason for your visit today?: std test  When did your symptoms begin?: 1-2 weeks ago  What are your symptoms?: pain or irritation while peeing  How would you describe these symptoms?: Mild  Are your symptoms:: Improving  Have you had these symptoms before?: No  Is there anything that makes you feel worse?: no  Is there anything that makes you feel better?: no

## 2023-11-25 DIAGNOSIS — N34.2 URETHRITIS: ICD-10-CM

## 2023-11-27 DIAGNOSIS — N34.2 URETHRITIS: ICD-10-CM

## 2023-11-27 LAB
HSV1 IGG SERPL QL IA: 2.35 INDEX
HSV1 IGG SERPL QL IA: ABNORMAL
HSV2 IGG SERPL QL IA: 3.61 INDEX
HSV2 IGG SERPL QL IA: ABNORMAL

## 2023-11-27 RX ORDER — TAMSULOSIN HYDROCHLORIDE 0.4 MG/1
CAPSULE ORAL
Qty: 2 CAPSULE | Refills: 0 | OUTPATIENT
Start: 2023-11-27

## 2023-11-28 RX ORDER — TAMSULOSIN HYDROCHLORIDE 0.4 MG/1
CAPSULE ORAL
Qty: 2 CAPSULE | Refills: 0 | Status: SHIPPED | OUTPATIENT
Start: 2023-11-28 | End: 2024-03-14

## 2023-12-27 ENCOUNTER — MYC REFILL (OUTPATIENT)
Dept: FAMILY MEDICINE | Facility: CLINIC | Age: 33
End: 2023-12-27

## 2023-12-27 DIAGNOSIS — F90.2 ATTENTION DEFICIT HYPERACTIVITY DISORDER (ADHD), COMBINED TYPE: ICD-10-CM

## 2023-12-27 RX ORDER — DEXTROAMPHETAMINE SACCHARATE, AMPHETAMINE ASPARTATE MONOHYDRATE, DEXTROAMPHETAMINE SULFATE AND AMPHETAMINE SULFATE 5; 5; 5; 5 MG/1; MG/1; MG/1; MG/1
20 CAPSULE, EXTENDED RELEASE ORAL DAILY
Qty: 30 CAPSULE | Refills: 0 | Status: SHIPPED | OUTPATIENT
Start: 2023-12-27 | End: 2024-01-25

## 2023-12-27 RX ORDER — DEXTROAMPHETAMINE SACCHARATE, AMPHETAMINE ASPARTATE, DEXTROAMPHETAMINE SULFATE AND AMPHETAMINE SULFATE 3.75; 3.75; 3.75; 3.75 MG/1; MG/1; MG/1; MG/1
TABLET ORAL
Qty: 30 TABLET | Refills: 0 | Status: SHIPPED | OUTPATIENT
Start: 2023-12-27 | End: 2024-01-25

## 2023-12-29 ENCOUNTER — VIRTUAL VISIT (OUTPATIENT)
Dept: FAMILY MEDICINE | Facility: CLINIC | Age: 33
End: 2023-12-29
Payer: COMMERCIAL

## 2023-12-29 DIAGNOSIS — F33.1 MODERATE EPISODE OF RECURRENT MAJOR DEPRESSIVE DISORDER (H): ICD-10-CM

## 2023-12-29 DIAGNOSIS — F90.2 ATTENTION DEFICIT HYPERACTIVITY DISORDER (ADHD), COMBINED TYPE: ICD-10-CM

## 2023-12-29 DIAGNOSIS — F10.90 ALCOHOL USE DISORDER: Primary | ICD-10-CM

## 2023-12-29 PROCEDURE — 99214 OFFICE O/P EST MOD 30 MIN: CPT | Mod: VID | Performed by: FAMILY MEDICINE

## 2023-12-29 RX ORDER — MIRTAZAPINE 15 MG/1
15 TABLET, FILM COATED ORAL AT BEDTIME
Qty: 90 TABLET | Refills: 1 | Status: SHIPPED | OUTPATIENT
Start: 2023-12-29 | End: 2024-03-11

## 2023-12-29 RX ORDER — MIRTAZAPINE 15 MG/1
15 TABLET, FILM COATED ORAL AT BEDTIME
COMMUNITY
Start: 2023-12-05 | End: 2023-12-29

## 2023-12-29 ASSESSMENT — ANXIETY QUESTIONNAIRES
7. FEELING AFRAID AS IF SOMETHING AWFUL MIGHT HAPPEN: NOT AT ALL
1. FEELING NERVOUS, ANXIOUS, OR ON EDGE: NOT AT ALL
GAD7 TOTAL SCORE: 2
2. NOT BEING ABLE TO STOP OR CONTROL WORRYING: SEVERAL DAYS
IF YOU CHECKED OFF ANY PROBLEMS ON THIS QUESTIONNAIRE, HOW DIFFICULT HAVE THESE PROBLEMS MADE IT FOR YOU TO DO YOUR WORK, TAKE CARE OF THINGS AT HOME, OR GET ALONG WITH OTHER PEOPLE: NOT DIFFICULT AT ALL
GAD7 TOTAL SCORE: 2
6. BECOMING EASILY ANNOYED OR IRRITABLE: NOT AT ALL
5. BEING SO RESTLESS THAT IT IS HARD TO SIT STILL: NOT AT ALL
3. WORRYING TOO MUCH ABOUT DIFFERENT THINGS: SEVERAL DAYS
4. TROUBLE RELAXING: NOT AT ALL

## 2023-12-29 NOTE — PROGRESS NOTES
"Instructions Relayed to Patient by Virtual Roomer:     Patient is active on [employer]:   Relayed following to patient: \"It looks like you are active on [employer], are you able to join the visit this way? If not, do you need us to send you a link now or would you like your provider to send a link via text or email when they are ready to initiate the visit?\"    Reminded patient to ensure they were logged on to virtual visit by arrival time listed. Documented in appointment notes if patient had flexibility to initiate visit sooner than arrival time. If pediatric virtual visit, ensured pediatric patient along with parent/guardian will be present for video visit.     Patient offered the website [email or web address] and/or phone number to [employer] Help line: [phone]    Vernon is a 33 year old who is being evaluated via a billable video visit.      How would you like to obtain your AVS? [employer]  If the video visit is dropped, the invitation should be resent by: Text to cell phone: [phone]  Will anyone else be joining your video visit? No    Assessment & Plan   1. Alcohol use disorder  Look at treatment options covered by work. Not interested in medications for cravings at this time. Follow-up if needing referral or consider medication.    2. Attention deficit hyperactivity disorder (ADHD), combined type  Ok to continue with adderall. Likely would worsen depression/anxiety and therefore alcohol use if discontinued and not able to work well. UDS appropriate in ER.    3. Moderate episode of recurrent major depressive disorder (H)  Refills given. No SI currently. Recommend 1 month follow-up. Patient will schedule.  - mirtazapine (REMERON) 15 MG tablet; Take 1 tablet (15 mg) by mouth at bedtime  Dispense: 90 tablet; Refill: 1        Tanner Ferro MD  [facility]    Disclaimer: This note consists of symbols derived from keyboarding, dictation and/or voice recognition " software. As a result, there may be errors in the script that have gone undetected. Please consider this when interpreting information found in this chart.    Subjective   Vernon is a 33 year old, presenting for the following health issues:  A.ELIZABETHHTERESE        12/29/2023     2:30 PM   Additional Questions   Roomed by antonio   Accompanied by self       ZEN    History of Present Illness       Reason for visit:  Me refviktor Mcdermott is here today to recheck ADHD/ADD.    Updates since last visit: pt says maintained - pt takes instant and extended together    Routine for taking medicine, including time: 530 - 6 am  Time medicine wears off: pt says around dinner time  Issues at work: none  Issues at home: none  Control of symptoms: yes    Side effects:  Headaches: No  Stomach aches: No  Irritability/mood swings: No  Difficulties with sleep: No  Social withdrawal: No - possibly  Unusual movements/tics: No  Decreased appetite: No    Other concerns: not right now    2 ER visits related to alcohol.    - 4:30 pm - 9 am doesn't remember, woke up in the hospital in October.  - SI casual in December. Started on remeron. Likes mirtazapine. Helps sleep and avoids alcohol. Helps depression symptoms. No current SI.    Has cravings still. Last drink yesterday. Trying to limit to 1 per day. Work offers paid treatment option. Needs to go through eval through HR. May be consideration of 30 day inpatient for alcohol.    Got a domestic assault charge notification in the mail he was unware of.    Reviewed ER records. Appropriate for adderal use and alcohol. No other substances seen.    Review of Systems   Constitutional, HEENT, cardiovascular, pulmonary, GI, , musculoskeletal, neuro, skin, endocrine and psych systems are negative, except as otherwise noted.      Objective       Vitals:  No vitals were obtained today due to virtual visit.    Physical Exam   GENERAL: Healthy, alert and no distress  EYES: Eyes grossly normal to inspection.   No discharge or erythema, or obvious scleral/conjunctival abnormalities.  RESP: No audible wheeze, cough, or visible cyanosis.  No visible retractions or increased work of breathing.    SKIN: Visible skin clear. No significant rash, abnormal pigmentation or lesions.  NEURO: Cranial nerves grossly intact.  Mentation and speech appropriate for age.  PSYCH: Mentation appears normal, affect normal/bright, judgement and insight intact, normal speech and appearance well-groomed.    Labs:         Video-Visit Details    Type of service:  Video Visit   Video Start Time: 4:26 PM  Video End Time:4:44 PM    Originating Location (pt. Location): Home    Distant Location (provider location):  On-site  Platform used for Video Visit: [email or web address]

## 2024-01-25 ENCOUNTER — MYC REFILL (OUTPATIENT)
Dept: FAMILY MEDICINE | Facility: CLINIC | Age: 34
End: 2024-01-25

## 2024-01-25 DIAGNOSIS — F90.2 ATTENTION DEFICIT HYPERACTIVITY DISORDER (ADHD), COMBINED TYPE: ICD-10-CM

## 2024-01-25 RX ORDER — DEXTROAMPHETAMINE SACCHARATE, AMPHETAMINE ASPARTATE, DEXTROAMPHETAMINE SULFATE AND AMPHETAMINE SULFATE 3.75; 3.75; 3.75; 3.75 MG/1; MG/1; MG/1; MG/1
TABLET ORAL
Qty: 30 TABLET | Refills: 0 | Status: SHIPPED | OUTPATIENT
Start: 2024-01-25 | End: 2024-04-12

## 2024-01-25 RX ORDER — DEXTROAMPHETAMINE SACCHARATE, AMPHETAMINE ASPARTATE MONOHYDRATE, DEXTROAMPHETAMINE SULFATE AND AMPHETAMINE SULFATE 5; 5; 5; 5 MG/1; MG/1; MG/1; MG/1
20 CAPSULE, EXTENDED RELEASE ORAL DAILY
Qty: 30 CAPSULE | Refills: 0 | Status: SHIPPED | OUTPATIENT
Start: 2024-01-25 | End: 2024-04-12

## 2024-01-26 ENCOUNTER — MYC MEDICAL ADVICE (OUTPATIENT)
Dept: FAMILY MEDICINE | Facility: CLINIC | Age: 34
End: 2024-01-26

## 2024-01-26 NOTE — TELEPHONE ENCOUNTER
Schedule video, telephone Need clarification on what type of episodes he's having, frequency, etc and discuss the medication and monitoring labs.    Tanner Ferro MD

## 2024-01-26 NOTE — TELEPHONE ENCOUNTER
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    Patient is wondering about prophylactic treatment to prevent breakouts.    Would you like an appointment to discuss further? Virtual okay?    MIRANDA BarillasN, RN  [facility] ~ Registered Nurse  Clinic Triage ~ [place] [facility] [place]  January 26, 2024

## 2024-01-30 NOTE — TELEPHONE ENCOUNTER
Appointments in Next Year      Mar 11, 2024  9:30 AM  (Arrive by 9:10 AM)  Preventative Adult Visit with Tanner Ferro MD  [facility] ([facility] ) [phone]          MAHAD Barillas, RN  [facility] ~ Registered Nurse  Clinic Triage ~ [place] [facility] [place]  January 30, 2024

## 2024-03-11 ENCOUNTER — OFFICE VISIT (OUTPATIENT)
Dept: FAMILY MEDICINE | Facility: CLINIC | Age: 34
End: 2024-03-11
Attending: FAMILY MEDICINE

## 2024-03-11 VITALS
HEART RATE: 76 BPM | TEMPERATURE: 97.3 F | OXYGEN SATURATION: 100 % | WEIGHT: 215 LBS | SYSTOLIC BLOOD PRESSURE: 124 MMHG | BODY MASS INDEX: 27.59 KG/M2 | HEIGHT: 74 IN | DIASTOLIC BLOOD PRESSURE: 66 MMHG | RESPIRATION RATE: 16 BRPM

## 2024-03-11 DIAGNOSIS — F41.1 GAD (GENERALIZED ANXIETY DISORDER): ICD-10-CM

## 2024-03-11 DIAGNOSIS — F33.1 MODERATE EPISODE OF RECURRENT MAJOR DEPRESSIVE DISORDER (H): ICD-10-CM

## 2024-03-11 DIAGNOSIS — F10.90 ALCOHOL USE DISORDER: ICD-10-CM

## 2024-03-11 DIAGNOSIS — F90.2 ATTENTION DEFICIT HYPERACTIVITY DISORDER (ADHD), COMBINED TYPE: Primary | ICD-10-CM

## 2024-03-11 PROCEDURE — 99214 OFFICE O/P EST MOD 30 MIN: CPT | Performed by: FAMILY MEDICINE

## 2024-03-11 RX ORDER — MIRTAZAPINE 15 MG/1
30 TABLET, FILM COATED ORAL AT BEDTIME
COMMUNITY
Start: 2024-03-11

## 2024-03-11 RX ORDER — HYDROXYZINE HYDROCHLORIDE 25 MG/1
25 TABLET, FILM COATED ORAL 3 TIMES DAILY PRN
Qty: 90 TABLET | Refills: 0 | Status: SHIPPED | OUTPATIENT
Start: 2024-03-11

## 2024-03-11 SDOH — HEALTH STABILITY: PHYSICAL HEALTH: ON AVERAGE, HOW MANY DAYS PER WEEK DO YOU ENGAGE IN MODERATE TO STRENUOUS EXERCISE (LIKE A BRISK WALK)?: 5 DAYS

## 2024-03-11 SDOH — HEALTH STABILITY: PHYSICAL HEALTH: ON AVERAGE, HOW MANY MINUTES DO YOU ENGAGE IN EXERCISE AT THIS LEVEL?: 150+ MIN

## 2024-03-11 ASSESSMENT — ANXIETY QUESTIONNAIRES
3. WORRYING TOO MUCH ABOUT DIFFERENT THINGS: NEARLY EVERY DAY
4. TROUBLE RELAXING: NEARLY EVERY DAY
IF YOU CHECKED OFF ANY PROBLEMS ON THIS QUESTIONNAIRE, HOW DIFFICULT HAVE THESE PROBLEMS MADE IT FOR YOU TO DO YOUR WORK, TAKE CARE OF THINGS AT HOME, OR GET ALONG WITH OTHER PEOPLE: EXTREMELY DIFFICULT
GAD7 TOTAL SCORE: 21
5. BEING SO RESTLESS THAT IT IS HARD TO SIT STILL: NEARLY EVERY DAY
GAD7 TOTAL SCORE: 21
7. FEELING AFRAID AS IF SOMETHING AWFUL MIGHT HAPPEN: NEARLY EVERY DAY
7. FEELING AFRAID AS IF SOMETHING AWFUL MIGHT HAPPEN: NEARLY EVERY DAY
6. BECOMING EASILY ANNOYED OR IRRITABLE: NEARLY EVERY DAY
8. IF YOU CHECKED OFF ANY PROBLEMS, HOW DIFFICULT HAVE THESE MADE IT FOR YOU TO DO YOUR WORK, TAKE CARE OF THINGS AT HOME, OR GET ALONG WITH OTHER PEOPLE?: EXTREMELY DIFFICULT
1. FEELING NERVOUS, ANXIOUS, OR ON EDGE: NEARLY EVERY DAY
GAD7 TOTAL SCORE: 21
2. NOT BEING ABLE TO STOP OR CONTROL WORRYING: NEARLY EVERY DAY

## 2024-03-11 ASSESSMENT — PATIENT HEALTH QUESTIONNAIRE - PHQ9
10. IF YOU CHECKED OFF ANY PROBLEMS, HOW DIFFICULT HAVE THESE PROBLEMS MADE IT FOR YOU TO DO YOUR WORK, TAKE CARE OF THINGS AT HOME, OR GET ALONG WITH OTHER PEOPLE: EXTREMELY DIFFICULT
SUM OF ALL RESPONSES TO PHQ QUESTIONS 1-9: 23
SUM OF ALL RESPONSES TO PHQ QUESTIONS 1-9: 23

## 2024-03-11 ASSESSMENT — PAIN SCALES - GENERAL: PAINLEVEL: NO PAIN (0)

## 2024-03-11 ASSESSMENT — SOCIAL DETERMINANTS OF HEALTH (SDOH): HOW OFTEN DO YOU GET TOGETHER WITH FRIENDS OR RELATIVES?: NEVER

## 2024-03-11 NOTE — PROGRESS NOTES
Preventive Care Visit  [facility]  Tanner Ferro MD, Family Medicine  Mar 11, 2024    Assessment & Plan   1. Alcohol use disorder  Not interested in campral or naltrexone at this time. Will consider in the future. Hydroxyzine for anxiety PRN.  - hydrOXYzine HCl (ATARAX) 25 MG tablet; Take 1 tablet (25 mg) by mouth 3 times daily as needed for anxiety  Dispense: 90 tablet; Refill: 0    3. Moderate episode of recurrent major depressive disorder (H)  Increase remeron dosing to try and get better effect. Consider alternative if not improving.  - Adult Genetics & Metabolism Referral; Future  - mirtazapine (REMERON) 15 MG tablet; Take 2 tablets (30 mg) by mouth at bedtime    4. KALANI (generalized anxiety disorder)  Check pharmacogenetics testing given prior selective serotonin reuptake inhibitor's etc not helpful. Currently on remeron.  - Adult Genetics & Metabolism Referral; Future  - hydrOXYzine HCl (ATARAX) 25 MG tablet; Take 1 tablet (25 mg) by mouth 3 times daily as needed for anxiety  Dispense: 90 tablet; Refill: 0        Tanner Ferro MD  [facility]    Disclaimer: This note consists of symbols derived from keyboarding, dictation and/or voice recognition software. As a result, there may be errors in the script that have gone undetected. Please consider this when interpreting information found in this chart.    Du Junior is a 34 year old, presenting for the following:    Patient scheduled for physical today, but does not have insurance, will forgo. Wishes to discuss mental health.    Sober from alcohol still. Left residential rehab as was not a positive environment. [place] Cannot drive.        3/11/2024     9:34 AM   Additional Questions   Roomed by YK   Accompanied by self        Health Care Directive  Patient does not have a Health Care Directive or Living Will: Discussed advance care planning with patient;  however, patient declined at this time.    HPI    Answers submitted by the patient for this visit:  Patient Health Questionnaire (Submitted on 3/11/2024)  If you checked off any problems, how difficult have these problems made it for you to do your work, take care of things at home, or get along with other people?: Extremely difficult  PHQ9 TOTAL SCORE: 23  KALANI-7 (Submitted on 3/11/2024)  KALANI 7 TOTAL SCORE: 21    Depression Followup  How are you doing with your depression since your last visit? Worsened with sobriety. Has been sober since   Are you having other symptoms that might be associated with depression? Yes:     Have you had a significant life event?  Relationship Concerns and Transportation Concerns   Are you feeling anxious or having panic attacks?   Yes:     Do you have any concerns with your use of alcohol or other drugs? No    Social History     Tobacco Use    Smoking status: Some Days     Packs/day: 0.50     Years: 10.00     Additional pack years: 0.00     Total pack years: 5.00     Types: Cigarettes     Last attempt to quit: 2022     Years since quittin.2    Smokeless tobacco: Never   Vaping Use    Vaping Use: Every day    Substances: Nicotine, Flavoring    Devices: Disposable   Substance Use Topics    Alcohol use: Not Currently     Comment: Very rare occasion    Drug use: No         2023     8:42 AM 2023     3:40 PM 3/11/2024     9:12 AM   PHQ   PHQ-9 Total Score 7 7 23   Q9: Thoughts of better off dead/self-harm past 2 weeks Not at all Not at all Not at all         2023     6:47 AM 2023     3:42 PM 3/11/2024     9:13 AM   KALANI-7 SCORE   Total Score 8 (mild anxiety) 2 (minimal anxiety) 21 (severe anxiety)   Total Score 8 2 21         3/11/2024     9:12 AM   Last PHQ-9   1.  Little interest or pleasure in doing things 3   2.  Feeling down, depressed, or hopeless 3   3.  Trouble falling or staying asleep, or sleeping too much 3   4.  Feeling tired or having little  energy 3   5.  Poor appetite or overeating 3   6.  Feeling bad about yourself 3   7.  Trouble concentrating 3   8.  Moving slowly or restless 2   Q9: Thoughts of better off dead/self-harm past 2 weeks 0   PHQ-9 Total Score 23         3/11/2024     9:13 AM   KALANI-7    1. Feeling nervous, anxious, or on edge 3   2. Not being able to stop or control worrying 3   3. Worrying too much about different things 3   4. Trouble relaxing 3   5. Being so restless that it is hard to sit still 3   6. Becoming easily annoyed or irritable 3   7. Feeling afraid, as if something awful might happen 3   KALANI-7 Total Score 21   If you checked any problems, how difficult have they made it for you to do your work, take care of things at home, or get along with other people? Extremely difficult     Medication Followup of Adderall  Taking Medication as prescribed: yes  Side Effects:  unable to sleep, loss of appetite  Medication Helping Symptoms:  yes        3/11/2024   General Health   How would you rate your overall physical health? (!) FAIR   Feel stress (tense, anxious, or unable to sleep) Very much   (!) STRESS CONCERN      3/11/2024   Nutrition   Three or more servings of calcium each day? (!) NO   Diet: Regular (no restrictions)   How many servings of fruit and vegetables per day? (!) I DON'T KNOW   How many sweetened beverages each day? (!) 2         3/11/2024   Exercise   Days per week of moderate/strenous exercise 5 days   Average minutes spent exercising at this level 150+ min         3/11/2024   Social Factors   Frequency of gathering with friends or relatives Never   Worry food won't last until get money to buy more Yes   Food not last or not have enough money for food? Yes   Do you have housing?  Yes   Are you worried about losing your housing? Yes   Lack of transportation? Yes   Unable to get utilities (heat,electricity)? No   Want help with housing or utility concern? No   (!) FOOD SECURITY CONCERN PRESENT (!) TRANSPORTATION  "CONCERN PRESENT(!) HOUSING CONCERN PRESENT(!) SOCIAL CONNECTIONS CONCERN      3/11/2024   Dental   Dentist two times every year? (!) NO         3/11/2024   TB Screening   Were you born outside of [place]?  No       Today's PHQ-9 Score:       3/11/2024     9:12 AM   PHQ-9 SCORE   PHQ-9 Total Score MyChart 23 (Severe depression)   PHQ-9 Total Score 23         3/11/2024   Substance Use   Alcohol more than 3/day or more than 7/wk Not Applicable   Do you use any other substances recreationally? No     Social History     Tobacco Use    Smoking status: Some Days     Packs/day: 0.50     Years: 10.00     Additional pack years: 0.00     Total pack years: 5.00     Types: Cigarettes     Last attempt to quit: 2022     Years since quittin.2    Smokeless tobacco: Never   Vaping Use    Vaping Use: Every day    Substances: Nicotine, Flavoring    Devices: Disposable   Substance Use Topics    Alcohol use: Not Currently     Comment: Very rare occasion    Drug use: No         3/11/2024   STI Screening   New sexual partner(s) since last STI/HIV test? No         3/11/2024   Contraception/Family Planning   Questions about contraception or family planning No     Reviewed and updated as needed this visit by Provider   Tobacco  Allergies  Meds  Problems  Med Hx  Surg Hx  Fam Hx        Social Hx Reviewed    Past Medical History:   Diagnosis Date    ADHD     Depressive disorder      History reviewed. No pertinent surgical history.    Review of Systems  Constitutional, HEENT, cardiovascular, pulmonary, GI, , musculoskeletal, neuro, skin, endocrine and psych systems are negative, except as otherwise noted.     Objective    Exam  /66   Pulse 76   Temp 97.3  F (36.3  C) (Temporal)   Resp 16   Ht 1.888 m (6' 2.33\")   Wt 97.5 kg (215 lb)   SpO2 100%   BMI 27.36 kg/m     Estimated body mass index is 27.36 kg/m  as calculated from the following:    Height as of this encounter: 1.888 m (6' 2.33\").    Weight as of this " encounter: 97.5 kg (215 lb).    Physical Exam      Signed Electronically by: Tanner Ferro MD

## 2024-03-11 NOTE — COMMUNITY RESOURCES LIST (ENGLISH)
03/11/2024   [facility]  N/A  For questions about this resource list or additional care needs, please contact your primary care clinic or care manager.  Phone: [phone]   Email: N/A   Address: [address] [place] [address]   Hours: N/A        Financial Stability       Rent and mortgage payment assistance  1  [facility] Office - [facility] - Rent Assistance Distance: 0.52 miles      In-Person   [address] [address]  Language: English  Hours: Mon - Fri 8:00 AM - 12:00 PM , Mon - Fri 1:00 PM - 4:00 PM  Fees: Free   Phone: (104) 390-8510 Email: rosanna@[facility] Website: https://central[facility]/social-services-office-[place]/     2  [facility] - Emergency Assistance - Rent and mortgage payment assistance Distance: 6.17 miles      In-Person   [address] MURALI Presley [address] [place] [address]  Language: English  Hours: Mon - Fri 8:00 AM - 4:30 PM  Fees: Free   Phone: (712) 440-1823 Email: [facility]. Website: http://www.[facility]./[facility]/index.php          Food and Nutrition       Food pantry  [address] [place] [address] Food Shelf Distance: 0.27 miles      In-Person, Am[place], Phone/Virtual   [address] [place] [address]  Language: English  Hours: Mon 5:30 PM - 7:30 PM Appt. Only, Tue 11:00 AM - 12:30 PM , Wed 9:00 AM - 11:00 AM Appt. Only, Thu 5:30 PM - 7:30 PM Appt. Only, Fri 11:30 AM - 12:30 PM  Fees: Free   Phone: (462) 931-5139 Email: director@[employer] Website: https://[employer].coms.org/     4  [facility] - Food Shelf Distance: 3.45 miles      Delivery, [place]   [address] [place] [address]  Language: English  Hours: Mon - Fri 9:00 AM - 12:00 PM  Fees: Free   Phone: (701) 178-6463 Email: joey@[employer] Website: http://[facility].org/     SNAP application assistance  5  [facility] and  Human Services Distance: 6.17 miles      In-Person   [address] [place] [address]  Language: English  Hours: Mon - Fri 8:00 AM - 4:30 PM  Fees: Free   Phone: (533) 474-7217 Email: [facility] Website: http://www.[facility]/index.php     6  [facility] Distance: 24.58 miles      Phone/Virtual   [address] [place] [address]  Language: English, Sudanese  Hours: Mon 9:00 AM - 1:00 PM , Tue - Thu 9:00 AM - 4:00 PM , Fri 9:00 AM - 1:00 PM  Fees: Free   Phone: (870) 697-1265 Email: info@[employer] Website: http://www.[employer].org/     Soup kitchen or free meals  7  [facility] Meals Distance: 18.8 miles      In-Person   [address] [place] [address]  Language: English  Hours: Wed 5:30 PM - 6:15 PM  Fees: Free   Phone: (431) 147-9225 Email: emely@[facility].[employer] Website: http://www.[facility].org/     8  [facility] - Family Table Meals - Family Table Meal Distance: 23.32 miles      [facility]   [address] [place] [address]  Language: English  Hours: Thu 5:00 PM - 6:30 PM  Fees: Free   Phone: (384) 115-1506 Email: Estorian@[employer] Website: http://www.[employer].org          Transportation       Free or low-cost transportation  9  [employer] Distance: 9.28 miles      In-Person   [address] [place] [address]  Language: English  Hours: Mon - Fri 6:00 AM - 6:00 PM  Fees: Insurance, Self Pay   Phone: (354) 808-9726 Email: info@[employer] Website: http://www.[employer]     10  [employer] [place] - Work-related transportation Distance: 12.68 miles      In-Person   [address] [place] [address]  Language: English  Hours: Wed 10:00 AM - 5:00 PM , Thu 10:00 AM - 6:00 PM , Fri 10:00 AM - 5:00 PM , Sat 10:00 AM - 4:00 PM  Fees: Free   Phone: (286) 708-4319 Email: santosh@[facility]  Website: http://www.[employer]     Transportation to medical appointments  11  [employer] Distance: 9.28 miles      In-Person   [address] [place] [address]  Language: English  Hours: Mon - Fri 6:00 AM - 6:00 PM  Fees: Insurance, Self Pay   Phone: (287) 365-9118 Email: info@[employer] Website: http://www.[employer]     12  [facility] Distance: 12.68 miles      In-Person   [address] [place] [address]  Language: English  Hours: Wed 10:00 AM - 5:00 PM , Thu 10:00 AM - 6:00 PM , Fri 10:00 AM - 5:00 PM , Sat 10:00 AM - 4:00 PM  Fees: Free   Phone: (854) 306-3422 Email: santosh@[employer] Website: http://www.[employer]          Important Numbers & Websites       Emergency Services   911  [facility] Services   311  Poison Control   (547) 615-4688  Suicide Prevention Lifeline   (955) 445-5140 (TALK)  Child Abuse Hotline   (525) 671-8957 (4-A-Child)  Sexual Assault Hotline   (622) 615-8375 (HOPE)  National Runaway Safeline   (213) 882-6362 (RUNAWAY)  All-Options Talkline   (665) 960-8484  Substance Abuse Referral   (882) 228-1112 (HELP)

## 2024-04-12 ENCOUNTER — MYC REFILL (OUTPATIENT)
Dept: FAMILY MEDICINE | Facility: CLINIC | Age: 34
End: 2024-04-12

## 2024-04-12 DIAGNOSIS — F90.2 ATTENTION DEFICIT HYPERACTIVITY DISORDER (ADHD), COMBINED TYPE: ICD-10-CM

## 2024-04-12 RX ORDER — DEXTROAMPHETAMINE SACCHARATE, AMPHETAMINE ASPARTATE MONOHYDRATE, DEXTROAMPHETAMINE SULFATE AND AMPHETAMINE SULFATE 5; 5; 5; 5 MG/1; MG/1; MG/1; MG/1
20 CAPSULE, EXTENDED RELEASE ORAL DAILY
Qty: 30 CAPSULE | Refills: 0 | Status: SHIPPED | OUTPATIENT
Start: 2024-04-12 | End: 2024-05-17

## 2024-04-12 RX ORDER — DEXTROAMPHETAMINE SACCHARATE, AMPHETAMINE ASPARTATE, DEXTROAMPHETAMINE SULFATE AND AMPHETAMINE SULFATE 3.75; 3.75; 3.75; 3.75 MG/1; MG/1; MG/1; MG/1
TABLET ORAL
Qty: 30 TABLET | Refills: 0 | Status: SHIPPED | OUTPATIENT
Start: 2024-04-12 | End: 2024-05-17

## 2024-05-17 ENCOUNTER — MYC REFILL (OUTPATIENT)
Dept: FAMILY MEDICINE | Facility: CLINIC | Age: 34
End: 2024-05-17

## 2024-05-17 DIAGNOSIS — F90.2 ATTENTION DEFICIT HYPERACTIVITY DISORDER (ADHD), COMBINED TYPE: ICD-10-CM

## 2024-05-17 RX ORDER — DEXTROAMPHETAMINE SACCHARATE, AMPHETAMINE ASPARTATE, DEXTROAMPHETAMINE SULFATE AND AMPHETAMINE SULFATE 3.75; 3.75; 3.75; 3.75 MG/1; MG/1; MG/1; MG/1
TABLET ORAL
Qty: 30 TABLET | Refills: 0 | Status: SHIPPED | OUTPATIENT
Start: 2024-05-17 | End: 2024-06-19

## 2024-05-17 RX ORDER — DEXTROAMPHETAMINE SACCHARATE, AMPHETAMINE ASPARTATE MONOHYDRATE, DEXTROAMPHETAMINE SULFATE AND AMPHETAMINE SULFATE 5; 5; 5; 5 MG/1; MG/1; MG/1; MG/1
20 CAPSULE, EXTENDED RELEASE ORAL DAILY
Qty: 30 CAPSULE | Refills: 0 | Status: SHIPPED | OUTPATIENT
Start: 2024-05-17 | End: 2024-06-19

## 2024-06-19 ENCOUNTER — MYC REFILL (OUTPATIENT)
Dept: FAMILY MEDICINE | Facility: CLINIC | Age: 34
End: 2024-06-19

## 2024-06-19 DIAGNOSIS — F90.2 ATTENTION DEFICIT HYPERACTIVITY DISORDER (ADHD), COMBINED TYPE: ICD-10-CM

## 2024-06-19 RX ORDER — DEXTROAMPHETAMINE SACCHARATE, AMPHETAMINE ASPARTATE MONOHYDRATE, DEXTROAMPHETAMINE SULFATE AND AMPHETAMINE SULFATE 5; 5; 5; 5 MG/1; MG/1; MG/1; MG/1
20 CAPSULE, EXTENDED RELEASE ORAL DAILY
Qty: 30 CAPSULE | Refills: 0 | Status: SHIPPED | OUTPATIENT
Start: 2024-06-19 | End: 2024-07-24

## 2024-06-19 RX ORDER — DEXTROAMPHETAMINE SACCHARATE, AMPHETAMINE ASPARTATE, DEXTROAMPHETAMINE SULFATE AND AMPHETAMINE SULFATE 3.75; 3.75; 3.75; 3.75 MG/1; MG/1; MG/1; MG/1
TABLET ORAL
Qty: 30 TABLET | Refills: 0 | Status: SHIPPED | OUTPATIENT
Start: 2024-06-19 | End: 2024-07-24

## 2024-06-19 NOTE — LETTER
June 26, 2024      Baldemar Johnson  [address] [address]                Good Morning,      Your provider has sent a gladis refill for your adderall. You are due for a office visit before any more refills will be sent. All further requests without an appointment will be declined.      Please schedule via Active DSP or call [phone].      Have a good dayBILL [facility] Cent[facility]uddy Maynard

## 2024-06-19 NOTE — TELEPHONE ENCOUNTER
A 1x gladis refill has been given. Patient is due for a follow-up appointment.    Please contact patient and assist in scheduling appointment.     Inform patient future refills will be declined without completing appointment for monitoring control, or making mutually agreed upon follow-up arrangements .       Thank you,    Dr. Pool

## 2024-06-30 ENCOUNTER — HEALTH MAINTENANCE LETTER (OUTPATIENT)
Age: 34
End: 2024-06-30

## 2024-07-24 ENCOUNTER — MYC REFILL (OUTPATIENT)
Dept: FAMILY MEDICINE | Facility: CLINIC | Age: 34
End: 2024-07-24

## 2024-07-24 DIAGNOSIS — F90.2 ATTENTION DEFICIT HYPERACTIVITY DISORDER (ADHD), COMBINED TYPE: ICD-10-CM

## 2024-07-25 RX ORDER — DEXTROAMPHETAMINE SACCHARATE, AMPHETAMINE ASPARTATE MONOHYDRATE, DEXTROAMPHETAMINE SULFATE AND AMPHETAMINE SULFATE 5; 5; 5; 5 MG/1; MG/1; MG/1; MG/1
20 CAPSULE, EXTENDED RELEASE ORAL DAILY
Qty: 30 CAPSULE | Refills: 0 | Status: SHIPPED | OUTPATIENT
Start: 2024-07-25 | End: 2024-09-24

## 2024-07-25 RX ORDER — DEXTROAMPHETAMINE SACCHARATE, AMPHETAMINE ASPARTATE, DEXTROAMPHETAMINE SULFATE AND AMPHETAMINE SULFATE 3.75; 3.75; 3.75; 3.75 MG/1; MG/1; MG/1; MG/1
TABLET ORAL
Qty: 30 TABLET | Refills: 0 | Status: SHIPPED | OUTPATIENT
Start: 2024-07-25 | End: 2024-09-24

## 2024-09-24 ENCOUNTER — MYC REFILL (OUTPATIENT)
Dept: FAMILY MEDICINE | Facility: CLINIC | Age: 34
End: 2024-09-24

## 2024-09-24 DIAGNOSIS — F90.2 ATTENTION DEFICIT HYPERACTIVITY DISORDER (ADHD), COMBINED TYPE: ICD-10-CM

## 2024-09-24 NOTE — LETTER
September 27, 2024      Baldemar Johnson  [address] [address]                Your medication request for amphetamine-dextroamphetamine (ADDERALL XR) 20 MG 24 hr capsule and amphetamine-dextroamphetamine (ADDERALL) 15 MG tablet has been approved for a one-time refill. You are due for a follow-up visit prior to further refills. Please note that future refills will be declined without completing appointment.      Please call us at [phone] or use Next audience to schedule this appointment prior to running out of your medication.     We hope to hear from you soon,     Your [facility] Team

## 2024-09-25 RX ORDER — DEXTROAMPHETAMINE SACCHARATE, AMPHETAMINE ASPARTATE MONOHYDRATE, DEXTROAMPHETAMINE SULFATE AND AMPHETAMINE SULFATE 5; 5; 5; 5 MG/1; MG/1; MG/1; MG/1
20 CAPSULE, EXTENDED RELEASE ORAL DAILY
Qty: 30 CAPSULE | Refills: 0 | Status: SHIPPED | OUTPATIENT
Start: 2024-09-25

## 2024-09-25 RX ORDER — DEXTROAMPHETAMINE SACCHARATE, AMPHETAMINE ASPARTATE, DEXTROAMPHETAMINE SULFATE AND AMPHETAMINE SULFATE 3.75; 3.75; 3.75; 3.75 MG/1; MG/1; MG/1; MG/1
TABLET ORAL
Qty: 30 TABLET | Refills: 0 | Status: SHIPPED | OUTPATIENT
Start: 2024-09-25

## 2024-10-30 ENCOUNTER — MYC REFILL (OUTPATIENT)
Dept: FAMILY MEDICINE | Facility: CLINIC | Age: 34
End: 2024-10-30

## 2024-10-30 DIAGNOSIS — F90.2 ATTENTION DEFICIT HYPERACTIVITY DISORDER (ADHD), COMBINED TYPE: ICD-10-CM

## 2024-10-31 RX ORDER — DEXTROAMPHETAMINE SACCHARATE, AMPHETAMINE ASPARTATE, DEXTROAMPHETAMINE SULFATE AND AMPHETAMINE SULFATE 3.75; 3.75; 3.75; 3.75 MG/1; MG/1; MG/1; MG/1
TABLET ORAL
Qty: 30 TABLET | Refills: 0 | OUTPATIENT
Start: 2024-10-31

## 2024-10-31 RX ORDER — DEXTROAMPHETAMINE SACCHARATE, AMPHETAMINE ASPARTATE MONOHYDRATE, DEXTROAMPHETAMINE SULFATE AND AMPHETAMINE SULFATE 5; 5; 5; 5 MG/1; MG/1; MG/1; MG/1
20 CAPSULE, EXTENDED RELEASE ORAL DAILY
Qty: 30 CAPSULE | Refills: 0 | OUTPATIENT
Start: 2024-10-31

## 2024-11-04 NOTE — TELEPHONE ENCOUNTER
Mychart read   
Mychart sent  
This refill request has been declined as the patient is due for an office visit.     Please schedule follow-up appointment. Schedule with preventative visit if due.     Ok to use approval, or same day slots to accommodate patient schedule.     Thank you,    Dr. Pool    
Statement Selected

## 2025-01-14 ENCOUNTER — MYC MEDICAL ADVICE (OUTPATIENT)
Dept: FAMILY MEDICINE | Facility: CLINIC | Age: 35
End: 2025-01-14

## 2025-01-14 NOTE — LETTER
January 28, 2025      Baldemar Johnson  [address] [address]                  Hello,     We care about your health and have reviewed your health plan including your medical conditions, medications, and lab results.  Based on this review, it is recommended that you follow up regarding the following health topic(s):  -Depression  -Wellness (Physical) Visit   -Immunizations:       Health Maintenance Due   Topic Date Due    Pneumococcal Vaccine: Pediatrics (0 to 5 Years) and At-Risk Patients (6 to 49 Years) (2 of 2 - PCV) 10/06/2022    INFLUENZA VACCINE (1) 09/01/2024    COVID-19 Vaccine (3 - 2024-25 season) 09/01/2024      We recommend you take the following action(s):  -schedule a WELLNESS (Physical) APPOINTMENT.  We will perform the following labs: none at this time.  -Complete and return the attached PHQ-9 Form.  If your total score is greater than 9, please schedule a followup appointment.  If you answer Yes to question 9, call your clinic between the hours of 8 to 5.  You may also call the Suicide Hotline at 5-501-209-KQDX (4232) any time.     Please call us at the [facility] [phone] (or use Zase) to address the above recommendations.        If you are no longer a patient with us please give us the name of the clinic and/or provider you have transferred your care to so we may update our records and we will no longer reach out to you.     Thank you for trusting [facility] and we appreciate the opportunity to serve you.  We look forward to supporting your healthcare needs in the future.     Healthy Regards,     Your Health Care Team at [facility]

## 2025-01-15 NOTE — TELEPHONE ENCOUNTER
Patient Quality Outreach    Patient is due for the following:   Depression  -  PHQ-9 needed  Physical Preventive Adult Physical      Topic Date Due    Pneumococcal Vaccine (2 of 2 - PCV) 10/06/2022    Flu Vaccine (1) 09/01/2024    COVID-19 Vaccine (3 - 2024-25 season) 09/01/2024       Action(s) Taken:   Schedule a Adult Preventative  Patient was assigned appropriate questionnaire to complete    Type of outreach:    Sent App[employer]o message.  Call in 1 week if not read/completed; send letter in 2 weeks if not returned/read.     Questions for provider review:    None           Nuzhat Griffith, [facility]  Chart routed to Care Team.

## 2025-01-21 ENCOUNTER — TELEPHONE (OUTPATIENT)
Dept: FAMILY MEDICINE | Facility: CLINIC | Age: 35
End: 2025-01-21

## 2025-03-26 ENCOUNTER — MYC REFILL (OUTPATIENT)
Dept: FAMILY MEDICINE | Facility: CLINIC | Age: 35
End: 2025-03-26

## 2025-03-26 DIAGNOSIS — F90.2 ATTENTION DEFICIT HYPERACTIVITY DISORDER (ADHD), COMBINED TYPE: ICD-10-CM

## 2025-03-26 RX ORDER — DEXTROAMPHETAMINE SACCHARATE, AMPHETAMINE ASPARTATE, DEXTROAMPHETAMINE SULFATE AND AMPHETAMINE SULFATE 3.75; 3.75; 3.75; 3.75 MG/1; MG/1; MG/1; MG/1
TABLET ORAL
Qty: 30 TABLET | Refills: 0 | OUTPATIENT
Start: 2025-03-26

## 2025-03-26 RX ORDER — DEXTROAMPHETAMINE SACCHARATE, AMPHETAMINE ASPARTATE MONOHYDRATE, DEXTROAMPHETAMINE SULFATE AND AMPHETAMINE SULFATE 5; 5; 5; 5 MG/1; MG/1; MG/1; MG/1
20 CAPSULE, EXTENDED RELEASE ORAL DAILY
Qty: 30 CAPSULE | Refills: 0 | OUTPATIENT
Start: 2025-03-26

## 2025-03-26 NOTE — TELEPHONE ENCOUNTER
Rx has not been filled since 9/2024.  Please have patient make an appointment for follow up with PCP to restart medication.  Adeline Fontana PA-C

## 2025-03-26 NOTE — TELEPHONE ENCOUNTER
These medications have not been filled since September in review on PDMP, he is overdue for an appointment.  Please call to follow-up with PCP.  I will not approve these medications at this time.  Vannessa Li PA-C

## 2025-04-22 ENCOUNTER — MYC MEDICAL ADVICE (OUTPATIENT)
Dept: FAMILY MEDICINE | Facility: CLINIC | Age: 35
End: 2025-04-22

## 2025-04-22 NOTE — TELEPHONE ENCOUNTER
Patient Quality Outreach    Patient is due for the following:   Depression  -  PHQ-9 needed  Physical Preventive Adult Physical      Topic Date Due    Pneumococcal Vaccine (2 of 2 - PCV) 10/06/2022    Flu Vaccine (1) 09/01/2024    COVID-19 Vaccine (3 - 2024-25 season) 09/01/2024       Action(s) Taken:   Schedule a Adult Preventative  Patient was assigned appropriate questionnaire to complete    Type of outreach:    Sent ACE*COMM message.  Pt has not been seen in over a year. Please verify if pt is still receiving care at [facility] or following at another facility. Please update PCP accordingly.     Questions for provider review:    None         Nuzhat Griffith, [facility]  Chart routed to Care Team.

## 2025-04-29 NOTE — TELEPHONE ENCOUNTER
Patient Quality Outreach 2nd Attempt      Summary:    Type of outreach:    None patient read Rawporter message    Next Steps:  Reach out within 90 days via [employer].    Max number of attempts reached:  N/A . Will try again in 90 days if patient still on fail list.    Questions for provider review:    None                                                                                                                    VIVIAN Abbott MA     Chart routed to None.

## 2025-07-13 ENCOUNTER — HEALTH MAINTENANCE LETTER (OUTPATIENT)
Age: 35
End: 2025-07-13

## (undated) DEVICE — DRSG STERI STRIP 1/2X4" R1547

## (undated) DEVICE — PACK HAND CUSTOM ASC

## (undated) DEVICE — PREP CHLORHEXIDINE 4% 4OZ (HIBICLENS) 57504

## (undated) DEVICE — SOL WATER IRRIG 500ML BOTTLE 2F7113

## (undated) DEVICE — LINEN ORTHO PACK 5446

## (undated) DEVICE — SOL NACL 0.9% IRRIG 500ML BOTTLE 2F7123

## (undated) DEVICE — GLOVE PROTEXIS MICRO 7.5  2D73PM75

## (undated) DEVICE — PREP SKIN SCRUB TRAY 4461A

## (undated) DEVICE — DRAPE STERI TOWEL LG 1010

## (undated) DEVICE — SYR PISTON IRRIGATION 60 ML DYND20325

## (undated) DEVICE — SUCTION MANIFOLD NEPTUNE 2 SYS 1 PORT 702-025-000

## (undated) DEVICE — COVER CAMERA IN-LIGHT DISP LT-C02

## (undated) RX ORDER — FENTANYL CITRATE 50 UG/ML
INJECTION, SOLUTION INTRAMUSCULAR; INTRAVENOUS
Status: DISPENSED
Start: 2023-01-05

## (undated) RX ORDER — PROPOFOL 10 MG/ML
INJECTION, EMULSION INTRAVENOUS
Status: DISPENSED
Start: 2023-01-05